# Patient Record
Sex: MALE | Race: WHITE | Employment: OTHER | URBAN - METROPOLITAN AREA
[De-identification: names, ages, dates, MRNs, and addresses within clinical notes are randomized per-mention and may not be internally consistent; named-entity substitution may affect disease eponyms.]

---

## 2017-02-13 ENCOUNTER — TELEPHONE (OUTPATIENT)
Dept: CARDIOLOGY CLINIC | Age: 62
End: 2017-02-13

## 2017-02-13 DIAGNOSIS — E78.00 HYPERCHOLESTEROLEMIA: Primary | ICD-10-CM

## 2017-02-13 NOTE — TELEPHONE ENCOUNTER
Patient called requesting lipid panel labs to be completed before his next visit with Dr Shar Hyde at the end of the month. Patient stated that his understanding was that Dr Shar Hyde wanted to see how his labs looked without any medication. Per Dr Missy Stockton last note, he was supposed to restart the medication after recovering from knee surgery, and if myalgias were still a problem, patient would stop it completely and maybe start something like Livalo in the future. Labs entered in patient's chart today and he states that he will get those labs completed prior to his next appt.       Verbal order and read back per Mandi Ortega MD

## 2017-02-14 ENCOUNTER — HOSPITAL ENCOUNTER (OUTPATIENT)
Dept: LAB | Age: 62
Discharge: HOME | End: 2017-02-14
Payer: COMMERCIAL

## 2017-02-14 DIAGNOSIS — E78.00 HYPERCHOLESTEROLEMIA: ICD-10-CM

## 2017-02-14 LAB
CHOLEST SERPL-MCNC: 284 MG/DL
HDLC SERPL-MCNC: 89 MG/DL (ref 40–60)
HDLC SERPL: 3.2 {RATIO} (ref 0–5)
LDLC SERPL CALC-MCNC: 186.2 MG/DL (ref 0–100)
LIPID PROFILE,FLP: ABNORMAL
TRIGL SERPL-MCNC: 44 MG/DL (ref ?–150)
VLDLC SERPL CALC-MCNC: 8.8 MG/DL

## 2017-02-14 PROCEDURE — 80061 LIPID PANEL: CPT | Performed by: INTERNAL MEDICINE

## 2017-02-14 PROCEDURE — 36415 COLL VENOUS BLD VENIPUNCTURE: CPT | Performed by: INTERNAL MEDICINE

## 2017-02-15 ENCOUNTER — OFFICE VISIT (OUTPATIENT)
Dept: FAMILY MEDICINE CLINIC | Age: 62
End: 2017-02-15

## 2017-02-15 VITALS
SYSTOLIC BLOOD PRESSURE: 126 MMHG | OXYGEN SATURATION: 96 % | HEART RATE: 56 BPM | BODY MASS INDEX: 30.03 KG/M2 | DIASTOLIC BLOOD PRESSURE: 84 MMHG | RESPIRATION RATE: 16 BRPM | WEIGHT: 234 LBS | HEIGHT: 74 IN | TEMPERATURE: 97.7 F

## 2017-02-15 DIAGNOSIS — I45.10 RBBB: ICD-10-CM

## 2017-02-15 DIAGNOSIS — Z12.11 COLON CANCER SCREENING: ICD-10-CM

## 2017-02-15 DIAGNOSIS — E78.00 HYPERCHOLESTEROLEMIA: ICD-10-CM

## 2017-02-15 DIAGNOSIS — I71.40 AAA (ABDOMINAL AORTIC ANEURYSM) WITHOUT RUPTURE: ICD-10-CM

## 2017-02-15 DIAGNOSIS — R73.03 PREDIABETES: Primary | ICD-10-CM

## 2017-02-15 DIAGNOSIS — Z12.5 PROSTATE CANCER SCREENING: ICD-10-CM

## 2017-02-15 DIAGNOSIS — I49.1 PAC (PREMATURE ATRIAL CONTRACTION): ICD-10-CM

## 2017-02-15 DIAGNOSIS — I35.1 AORTIC VALVE INSUFFICIENCY, UNSPECIFIED ETIOLOGY: ICD-10-CM

## 2017-02-15 LAB — HBA1C MFR BLD HPLC: 5.7 %

## 2017-02-15 NOTE — PATIENT INSTRUCTIONS

## 2017-02-15 NOTE — PROGRESS NOTES
Chief Complaint   Patient presents with    Cholesterol Problem    Other     prediabetes     Due for repeat colonoscopy. Per patient he is discontinued Pravastatin in Aug per Dr. Leo Suresh. Zostavax: has not had  Colonoscopy: due for repeat colonoscopy      1. Have you been to the ER, urgent care clinic since your last visit? Hospitalized since your last visit? No    2. Have you seen or consulted any other health care providers outside of the [facility] since your last visit? Include any pap smears or colon screening.  Yes When: Dr. Leo Suresh

## 2017-02-15 NOTE — MR AVS SNAPSHOT
Visit Information Date & Time Provider Department Dept. Phone Encounter #  
 2/15/2017  5:00 PM Alicia Dorsey, [address] [id number] Follow-up Instructions Return in about 6 months (around 8/15/2017) for routine care and please have 10 hour fasting labs done 1 week prior. Your Appointments 2/24/2017  4:00 PM  
Follow Up with Liliana Garnett MD  
[facility] ([facility] Appt Note: 1 year with an ekg Alfred Hirsch [facility] [id number]  
[phone] [address] [facility] [address]  [address] 3/17/2017  8:00 AM  
PROCEDURE with [facility]  
[facility] (LAUREN SCHEDULING) Appt Note: aaa [facility]val [address] [address]  
[phone] [address] [facility] [address]  
  
    
 3/22/2017  1:00 PM  
Follow Up with Maribel Quinteros MD  
[facility] (LAUREN SCHEDULING) [address] [address]  
[phone]  
  
   
 [address] [address] Upcoming Health Maintenance Date Due ZOSTER VACCINE AGE 60> 1/10/2015 COLONOSCOPY 1/29/2017 DTaP/Tdap/Td series (2 - Td) 6/29/2021 Allergies as of 2/15/2017  Review Complete On: 2/15/2017 By: Alicia Dorsey MD  
  
 Severity Noted Reaction Type Reactions Simvastatin High 02/22/2016   Intolerance Myalgia Current Immunizations  Reviewed on 8/17/2016 Name Date Influenza Vaccine 9/8/2016, 8/19/2015, 9/17/2014 Influenza Vaccine Split 10/17/2012 TDAP Vaccine 6/29/2011 Not reviewed this visit You Were Diagnosed With   
  
 Codes Comments Prediabetes    -  Primary ICD-10-CM: R73.03 
ICD-9-CM: 790.29 Hypercholesterolemia     ICD-10-CM: E78.00 ICD-9-CM: 272.0   
 AAA (abdominal aortic aneurysm) without rupture (HCC)     ICD-10-CM: I71.4 ICD-9-CM: 729. 4 Aortic valve insufficiency, unspecified etiology     ICD-10-CM: I35.1 ICD-9-CM: 424.1 RBBB     ICD-10-CM: I45.10 ICD-9-CM: 426.4 PAC (premature atrial contraction)     ICD-10-CM: I49.1 ICD-9-CM: 427.61 Prostate cancer screening     ICD-10-CM: Z12.5 ICD-9-CM: V76.44 Colon cancer screening     ICD-10-CM: Z12.11 ICD-9-CM: V76.51 Vitals BP Pulse Temp Resp Height(growth percentile) Weight(growth percentile) 126/84 (!) 56 97.7 °F (36.5 °C) (Oral) 16 6' 2\" (1.88 m) 234 lb (106.1 kg) SpO2 BMI Smoking Status 96% 30.04 kg/m2 Never Smoker Vitals History BMI and BSA Data Body Mass Index Body Surface Area 30.04 kg/m 2 2.35 m 2 Preferred Pharmacy Pharmacy Name Phone [address] [facility] [phone] Your Updated Medication List  
  
   
This list is accurate as of: 2/15/17  5:49 PM.  Always use your most recent med list.  
  
  
  
  
 EFFEXOR  mg capsule Generic drug:  venlafaxine-SR Take 150 mg by mouth daily. Indications: ANXIETY WITH DEPRESSION We Performed the Following AMB POC HEMOGLOBIN A1C [67974 CPT(R)] REFERRAL TO GASTROENTEROLOGY [CRU24 Custom] Comments:  
 Please evaluate patient for crcs Follow-up Instructions Return in about 6 months (around 8/15/2017) for routine care and please have 10 hour fasting labs done 1 week prior. To-Do List   
 02/15/2017 Lab:  CBC W/O DIFF   
  
 02/15/2017 Lab:  HEMOGLOBIN A1C W/O EAG   
  
 02/15/2017 Lab:  LIPID PANEL   
  
 02/15/2017 Lab:  METABOLIC PANEL, COMPREHENSIVE   
  
 02/15/2017 Lab:  PSA W/ REFLX FREE PSA Referral Information Referral ID Referred By Referred To  
  
 [id number] NAHOMY [facility], [address] [place] [address] [address] [place], [address] Phone: [phone] Fax: [phone] Visits Status Start Date End Date 1 New Request 2/15/17 2/15/18 If your referral has a status of pending review or denied, additional information will be sent to support the outcome of this decision. Patient Instructions A Healthy Lifestyle: Care Instructions Your Care Instructions A healthy lifestyle can help you feel good, stay at a healthy weight, and have plenty of energy for both work and play. A healthy lifestyle is something you can share with your whole family. A healthy lifestyle also can lower your risk for serious health problems, such as high blood pressure, heart disease, and diabetes. You can follow a few steps listed below to improve your health and the health of your family. Follow-up care is a key part of your treatment and safety. Be sure to make and go to all appointments, and call your doctor if you are having problems. Its also a good idea to know your test results and keep a list of the medicines you take. How can you care for yourself at home? · Do not eat too much sugar, fat, or fast foods. You can still have dessert and treats now and then. The goal is moderation. · Start small to improve your eating habits. Pay attention to portion sizes, drink less juice and soda pop, and eat more fruits and vegetables. ¨ Eat a healthy amount of food. A 3-ounce serving of meat, for example, is about the size of a deck of cards. Fill the rest of your plate with vegetables and whole grains. ¨ Limit the amount of soda and sports drinks you have every day. Drink more water when you are thirsty. ¨ Eat at least 5 servings of fruits and vegetables every day. It may seem like a lot, but it is not hard to reach this goal. A serving or helping is 1 piece of fruit, 1 cup of vegetables, or 2 cups of leafy, raw vegetables. Have an apple or some carrot sticks as an afternoon snack instead of a candy bar.  Try to have fruits and/or vegetables at every meal. 
 · Make exercise part of your daily routine. You may want to start with simple activities, such as walking, bicycling, or slow swimming. Try to be active 30 to 60 minutes every day. You do not need to do all 30 to 60 minutes all at once. For example, you can exercise 3 times a day for 10 or 20 minutes. Moderate exercise is safe for most people, but it is always a good idea to talk to your doctor before starting an exercise program. 
· Keep moving. Tingley Parody the lawn, work in the garden, or EZ-Apps. Take the stairs instead of the elevator at work. · If you smoke, quit. People who smoke have an increased risk for heart attack, stroke, cancer, and other lung illnesses. Quitting is hard, but there are ways to boost your chance of quitting tobacco for good. ¨ Use nicotine gum, patches, or lozenges. ¨ Ask your doctor about stop-smoking programs and medicines. ¨ Keep trying. In addition to reducing your risk of diseases in the future, you will notice some benefits soon after you stop using tobacco. If you have shortness of breath or asthma symptoms, they will likely get better within a few weeks after you quit. · Limit how much alcohol you drink. Moderate amounts of alcohol (up to 2 drinks a day for men, 1 drink a day for women) are okay. But drinking too much can lead to liver problems, high blood pressure, and other health problems. Family health If you have a family, there are many things you can do together to improve your health. · Eat meals together as a family as often as possible. · Eat healthy foods. This includes fruits, vegetables, lean meats and dairy, and whole grains. · Include your family in your fitness plan. Most people think of activities such as jogging or tennis as the way to fitness, but there are many ways you and your family can be more active. Anything that makes you breathe hard and gets your heart pumping is exercise. Here are some tips: ¨ Walk to do errands or to take your child to school or the bus. ¨ Go for a family bike ride after dinner instead of watching TV. Where can you learn more? Go to http://charo-primo.info/. Enter G715 in the search box to learn more about \"A Healthy Lifestyle: Care Instructions. \" Current as of: July 26, 2016 Content Version: 11.1 © 9356-9290 [employer]. Care instructions adapted under license by [employer] (which disclaims liability or warranty for this information). If you have questions about a medical condition or this instruction, always ask your healthcare professional. [address] any warranty or liability for your use of this information. Follow up in 6 months for routine care and please have 10 hour fasting labs done 1 week prior [facility]! Dear Nicolle Fall: Thank you for requesting a [facility] account. Our records indicate that you already have an active [facility] account. You can access your account anytime at [email or web address] [email or web address] Did you know that you can access your hospital and ER discharge instructions at any time in [facility]? You can also review all of your test results from your hospital stay or ER visit. Additional Information If you have questions, please visit the Frequently Asked Questions section of the [facility] website at [email or web address]/. Remember, [facility] is NOT to be used for urgent needs. For medical emergencies, dial 911. Now available from your iPhone and Android! Please provide this summary of care documentation to your next provider. Your primary care clinician is listed as Kyra Schuster. If you have any questions after today's visit, please call [phone].

## 2017-02-17 NOTE — PROGRESS NOTES
SUBJECTIVE  Chief Complaint   Patient presents with    Cholesterol Problem    Other     prediabetes     Patient presents for follow-up on their prediabetes, hypercholesterolemia, and AAA. Labs are up to date. We have reviewed the most recent labs since being off of pravastatin. He stopped due to mylagias. He says that they resolved now that he is off the statin. He has follow-up with cardiology soon. Is somewhat active with diet and exercise. He has had some tremors in his hands and has had chronic neck issues. He is seeing a chiropractor. His chiropractor stated that his pulse was slightly irregular and wanted to mention that to me. ROS:  History obtained from the patient  · General: negative for - chills, fever  · Respiratory: no cough, shortness of breath, or wheezing  · Cardiovascular: no chest pain, palpitations, or dyspnea on exertion  · Gastrointestinal: no abdominal pain, N/V    OBJECTIVE  Blood pressure 126/84, pulse (!) 56, temperature 97.7 °F (36.5 °C), temperature source Oral, resp. rate 16, height 6' 2\" (1.88 m), weight 234 lb (106.1 kg), SpO2 96 %. General:  alert, cooperative, well appearing, in no apparent distress. HEENT:  PERRLA, EOMI, MMM  Neck:  No masses. No carotid bruits. No thyromegaly. CV:  The heart sounds are regular in rate and rhythm. There is a normal S1 and S2. There or no murmurs. Lungs: Inspiratory and expiratory efforts are full and unlabored. Lung sounds are clear and equal to auscultation throughout all lung fields without wheezing, rales, or rhonchi. Neuro:    Normal gait. No deficits. Skin: no rashes, no jaundice. Psych: normal affect. Mood good. Oriented x 3. Judgement and insight intact. Results for orders placed or performed in visit on 02/15/17   AMB POC HEMOGLOBIN A1C   Result Value Ref Range    Hemoglobin A1c (POC) 5.7 %     ASSESSMENT / PLAN    ICD-10-CM ICD-9-CM    1.  Prediabetes R73.03 790.29 AMB POC HEMOGLOBIN A1C      CBC W/O DIFF      METABOLIC PANEL, COMPREHENSIVE      LIPID PANEL      HEMOGLOBIN A1C W/O EAG   2. Hypercholesterolemia Z20.92 035.9 METABOLIC PANEL, COMPREHENSIVE      LIPID PANEL   3. AAA (abdominal aortic aneurysm) without rupture (HCC) I71.4 441.4    4. Aortic valve insufficiency, unspecified etiology I35.1 424.1    5. RBBB I45.10 426.4    6. PAC (premature atrial contraction) I49.1 427.61    7. Prostate cancer screening Z12.5 V76.44 PSA W/ REFLX FREE PSA   8. Colon cancer screening Z12.11 V76.51 REFERRAL TO GASTROENTEROLOGY     Prediabetes - A1c looks great. Continue healthy living habits. Hypercholesterolemia - He will discuss this with his cardiologist.  Perhaps a different statin trial per his notes would be possible. AAA - follow-up with Dr. Nikolas Enriquez in March 2017 according to note. Aortic valve insufficiency-  Repeat ECHO in 1-2 years according to cardiology note. RBBB / PAC - perhaps what the chiropractor heard. Refer for colon cancer screening. Patient understands our medical plan. Patient has provided input and agrees with goals. Alternatives have been explained and offered. Risks/benefits and significant side effects of medications have been reviewed. Patient encouraged to review package inserts for any medications. All questions answered. The patient is to call if condition worsens or fails to improve. Follow-up Disposition:  Return in about 6 months (around 8/15/2017) for routine care and please have 10 hour fasting labs done 1 week prior.

## 2017-02-17 NOTE — PROGRESS NOTES
Per last note : Patient called requesting lipid panel labs to be completed before his next visit with Dr Juan Sauceda at the end of the month. Patient stated that his understanding was that Dr Juan Sauceda wanted to see how his labs looked without any medication. Per Dr Daiana Braun last note, he was supposed to restart the medication after recovering from knee surgery, and if myalgias were still a problem, patient would stop it completely and maybe start something like Livalo in the future. Labs entered in patient's chart today and he states that he will get those labs completed prior to his next appt.

## 2017-02-24 ENCOUNTER — OFFICE VISIT (OUTPATIENT)
Dept: CARDIOLOGY CLINIC | Age: 62
End: 2017-02-24

## 2017-02-24 VITALS
OXYGEN SATURATION: 97 % | SYSTOLIC BLOOD PRESSURE: 128 MMHG | BODY MASS INDEX: 29.77 KG/M2 | HEART RATE: 69 BPM | HEIGHT: 74 IN | DIASTOLIC BLOOD PRESSURE: 78 MMHG | WEIGHT: 232 LBS

## 2017-02-24 DIAGNOSIS — I45.10 RBBB: ICD-10-CM

## 2017-02-24 DIAGNOSIS — I71.40 AAA (ABDOMINAL AORTIC ANEURYSM) WITHOUT RUPTURE: ICD-10-CM

## 2017-02-24 DIAGNOSIS — E78.00 HYPERCHOLESTEROLEMIA: Primary | ICD-10-CM

## 2017-02-24 DIAGNOSIS — I35.1 AORTIC VALVE INSUFFICIENCY, UNSPECIFIED ETIOLOGY: ICD-10-CM

## 2017-02-24 RX ORDER — ROSUVASTATIN CALCIUM 5 MG/1
5 TABLET, COATED ORAL
Qty: 30 TAB | Refills: 6 | Status: SHIPPED | OUTPATIENT
Start: 2017-02-24 | End: 2017-10-04 | Stop reason: SDUPTHER

## 2017-02-24 NOTE — PROGRESS NOTES
1. Have you been to the ER, urgent care clinic since your last visit? Hospitalized since your last visit? No     2. Have you seen or consulted any other health care providers outside of the [address] [place] [address] since your last visit? Include any pap smears or colon screening.  No

## 2017-02-24 NOTE — PROGRESS NOTES
HISTORY OF PRESENT ILLNESS  Jono Simon is a 58 y.o. male. Cholesterol Problem   Pertinent negatives include no chest pain, no abdominal pain, no headaches and no shortness of breath. Patient presents for a follow-up office visit. He has a past medical history significant for dyslipidemia and atrial premature contractions. More recently he was discovered to have a small abdominal aortic aneurysm measuring 2.5 cm in diameter. He also discovered that he has a family history for premature coronary disease. He underwent a regular exercise treadmill stress test in August 2014 which was negative at 6 minutes total exercise time. More recently, he underwent an echocardiogram in March 2016 which showed preserved LV systolic function, EF 84-32% with grade 1 diastolic dysfunction and mild aortic valve insufficiency. He was last seen in the office 6 months ago. Since last visit, he has remained off his pravastatin because of myalgias, and he did notice an improvement in his symptoms after being off medication. He just recently had a repeat lipid panel off the medication to see what his cholesterol numbers would likely without any treatment. He denies any prolonged palpitations, no dizziness, no syncope. No chest pain, shortness of breath, orthopnea, leg swelling or PND. No significant change in his activity tolerance. Past Medical History:   Diagnosis Date    AAA (abdominal aortic aneurysm) ([facility] [address]     due 3/2017 to see Dr. Oriana Amato according to notes    Anxiety     Cardiac echocardiogram 03/04/2016    EF 55-60%. No RWMA. Gr 1 DDfx. Mild AI. No significant valvular heart disease.  Cardiac exercise stress test 08/01/2014    Neg EKG on maximal treadmill stress test.  One 6-beat episode of NSVT. Ex time 6 min.  Cardiovascular aorto-iliac duplex 03/12/2015    No AAA. Patent bilateral CIAs. Patent renal arteries.     Hearing loss     Hemorrhoids     Hypercholesterolemia     has not tolerated some statins in the past    Inguinal hernia, right     Lumbar herniated disc     L4-5, Dr. Beto Lloyd PAC (premature atrial contraction)     Prediabetes     S/P colonoscopy 1/29/07    Dr. Judy Albarado, no polyps      Current Outpatient Prescriptions   Medication Sig Dispense Refill    rosuvastatin (CRESTOR) 5 mg tablet Take 1 Tab by mouth nightly. 30 Tab 6    venlafaxine-SR (EFFEXOR XR) 150 mg capsule Take 150 mg by mouth daily. Indications: ANXIETY WITH DEPRESSION         Allergies   Allergen Reactions    Simvastatin Myalgia        Social History   Substance Use Topics    Smoking status: Never Smoker    Smokeless tobacco: Never Used    Alcohol use 0.0 oz/week     0 Cans of beer per week          Review of Systems   Constitutional: Negative for chills, fever and weight loss. HENT: Negative for nosebleeds. Eyes: Negative for blurred vision and double vision. Respiratory: Negative for cough, shortness of breath and wheezing. Cardiovascular: Negative for chest pain, palpitations, orthopnea, claudication, leg swelling and PND. Gastrointestinal: Negative for abdominal pain, heartburn, nausea and vomiting. Genitourinary: Negative for dysuria and hematuria. Musculoskeletal: Negative for falls, joint pain and myalgias. Skin: Negative for rash. Neurological: Negative for dizziness, focal weakness and headaches. Endo/Heme/Allergies: Does not bruise/bleed easily. Psychiatric/Behavioral: Negative for substance abuse. Visit Vitals    /78    Pulse 69    Ht 6' 2\" (1.88 m)    Wt 105.2 kg (232 lb)    SpO2 97%    BMI 29.79 kg/m2      Physical Exam   Constitutional: He is oriented to person, place, and time. He appears well-developed and well-nourished. HENT:   Head: Normocephalic and atraumatic. Eyes: Conjunctivae are normal.   Neck: Neck supple. No JVD present. Carotid bruit is not present.    Cardiovascular: Normal rate, regular rhythm, S1 normal, S2 normal and normal pulses. No extrasystoles are present. Exam reveals no gallop and no S3. No murmur heard. Pulmonary/Chest: Breath sounds normal. He has no wheezes. He has no rales. Abdominal: Soft. Bowel sounds are normal. There is no tenderness. Musculoskeletal: He exhibits no edema. Neurological: He is alert and oriented to person, place, and time. Skin: Skin is warm and dry. EKG: Normal sinus rhythm, leftward axis, occasional PVC, incomplete right bundle branch block, no ST or T wave abnormalities Concerning for ischemia. Compared to his previous EKG, right bundle branch block is no longer present. ASSESSMENT and PLAN    Dyslipidemia. The patient was taking pravastatin 40 mg daily up until last year. He states that this medication caused mild myalgias, but they improved once he was off the medication. He did not tolerate simvastatin or atorvastatin because of severe myalgias. A recent lipid panel done without medication showed a total cholesterol of 284, HDL 89, . This is a mild increase in his numbers from when he was taking the pravastatin. I recommended trying the lowest dose of Crestor to see if he will tolerate this. He could also try coenzyme Q 10 200 mg daily to see if this will minimize his myalgias. If he is able to tolerate his medication for several months, I would like to repeat a lipid panel and LFTs in April of this year. Intermittent right bundle branch block. This was seen on previous EKGs. Today's EKG shows more of an incomplete right bundle branch block, but he does not have any symptoms concerning for advanced AV block. This is clinically insignificant. Aortic valve insufficiency. This was only mild on a recent echocardiogram in 2016 and can be reevaluated in 2-3 years as long as he does not develop any new symptoms. Very Small AAA. Patient is scheduled to follow-up with vascular surgery. Followup in 6 months as previously scheduled.

## 2017-02-24 NOTE — MR AVS SNAPSHOT
Visit Information Date & Time Provider Department Dept. Phone Encounter #  
 2/24/2017  4:00 PM Sara Neal MD [facility] [phone] Your Appointments 3/17/2017  8:00 AM  
PROCEDURE with [facility] 1  
[facility] (LAUREN SCHEDULING) Appt Note: aaa duval [address] [address]  
[phone] [address] [facility] [address]  
  
    
 3/22/2017  2:00 PM  
Follow Up with Domingo Jurado MD  
[facility] (LAUREN SCHEDULING) Appt Note: 2 YR FU AFTER STUDY AT [facility]  ON 3/17/2017; GAVE PATIENT PREP; CALLED PT TO MOVE TO LATER TIME SAME DAY AND PT IS AWARE; 2 YR FU AFTER STUDY AT [facility] ON 3/17/2017 GAVE PATIENT PREP CALLED PT TO MOVE TO LATER TIME SAME DAY AND PT IS AWARE  
 [address] [facility] [address] [address]  
[phone]  
  
   
 [address] [facility] [address] [facility] [address] 8/16/2017  4:30 PM  
ROUTINE CARE with Brant Hutchison MD  
[facility] ([address]) Appt Note: 6 mo fu  
 [address] [facility] [address]  
[address] [facility] [address] [facility] [address] Upcoming Health Maintenance Date Due ZOSTER VACCINE AGE 60> 1/10/2015 COLONOSCOPY 1/29/2017 DTaP/Tdap/Td series (2 - Td) 6/29/2021 Allergies as of 2/24/2017  Review Complete On: 2/15/2017 By: Brant Hutchison MD  
  
 Severity Noted Reaction Type Reactions Simvastatin High 02/22/2016   Intolerance Myalgia Current Immunizations  Reviewed on 8/17/2016 Name Date Influenza Vaccine 9/8/2016, 8/19/2015, 9/17/2014 Influenza Vaccine Split 10/17/2012 TDAP Vaccine 6/29/2011 Not reviewed this visit You Were Diagnosed With   
  
 Codes Comments RBBB    -  Primary ICD-10-CM: I45.10 ICD-9-CM: 426.4 Aortic valve insufficiency, unspecified etiology     ICD-10-CM: I35.1 ICD-9-CM: 424.1 PAC (premature atrial contraction)     ICD-10-CM: I49.1 ICD-9-CM: 427.61   
 AAA (abdominal aortic aneurysm) without rupture (HCC)     ICD-10-CM: I71.4 ICD-9-CM: 441.4 Hypercholesterolemia     ICD-10-CM: E78.00 ICD-9-CM: 272.0 Vitals BP  
  
  
  
  
  
 128/78 Vitals History BMI and BSA Data Body Mass Index Body Surface Area  
 29.79 kg/m 2 2.34 m 2 Preferred Pharmacy Pharmacy Name Phone [facility] [address] [facility] 2/3 [phone] Your Updated Medication List  
  
   
This list is accurate as of: 2/24/17  4:19 PM.  Always use your most recent med list.  
  
  
  
  
 EFFEXOR  mg capsule Generic drug:  venlafaxine-SR Take 150 mg by mouth daily. Indications: ANXIETY WITH DEPRESSION We Performed the Following AMB POC EKG ROUTINE W/ 12 LEADS, INTER & REP [35603 CPT(R)] Introducing [address]! Dear Melonie Pickard: Thank you for requesting a e-SENS account. Our records indicate that you already have an active e-SENS account. You can access your account anytime at [email or web address] Did you know that you can access your hospital and ER discharge instructions at any time in e-SENS? You can also review all of your test results from your hospital stay or ER visit. Additional Information If you have questions, please visit the Frequently Asked Questions section of the e-SENS website at [email or web address] Remember, e-SENS is NOT to be used for urgent needs. For medical emergencies, dial 911. Now available from your iPhone and Android! Please provide this summary of care documentation to your next provider. Your primary care clinician is listed as Charito Cooper. If you have any questions after today's visit, please call [phone].

## 2017-03-17 ENCOUNTER — OFFICE VISIT (OUTPATIENT)
Dept: VASCULAR SURGERY | Age: 62
End: 2017-03-17

## 2017-03-17 DIAGNOSIS — I71.40 AAA (ABDOMINAL AORTIC ANEURYSM) WITHOUT RUPTURE: ICD-10-CM

## 2017-03-17 NOTE — PROCEDURES
[facility]   *** FINAL REPORT ***    Name: Maryse Chaudhari  MRN: [id number]       Outpatient  : [address] 1955  HIS Order #: [id number]  [address] [facility] Visit #: [id number]  Date: 17 Mar 2017    TYPE OF TEST: Aorto-Iliac Duplex    REASON FOR TEST  Suspected aortic aneurysm    B-Mode:-                 (cm)   1     2     3  Aortic diameter:         AP:     2.5   2.5   2.4                           TV:     2.5   2.5   2.4  Common iliac diameter:   Right: 1.50                           Left:  1.50    Duplex:-                           PSV  Stenosis                           ----- --------------------  Aorta: (1)                86.0 Normal         (2)                57.0         (3)                65.0    Right common iliac:  Right external iliac:     65.0    Left common iliac:        76.0 Normal  Left external iliac:    INTERPRETATION/FINDINGS  Duplex images were obtained using 2-D gray scale, color flow and  spectral doppler analysis. 1. Abdominal aorta is within normal limits, no aneurysm identified. 2. Bilateral common iliac arteries patent and within normal limits. Right common iliac/left external iliac artery velocites omitted by  error. 3. Multiphasic signals noted. 4. Mid SMA, proximal MERCEDES and proximal renal arteries patent. ADDITIONAL COMMENTS  Celiac: not accessed, gas   SMA Mid: 84 cm/sec  RRA Prx: 109 cm/sec   LRA Prx: 86 cm/sec  MERCEDES origin: 91 cm/sec    I have personally reviewed the data relevant to the interpretation of  this  study. TECHNOLOGIST: Malcik Rehman RVT, SOFIA  Signed: 2017 09:08 AM    PHYSICIAN: Sarthak Dockery D.O.   Signed: 2017 02:17 PM

## 2017-03-22 ENCOUNTER — OFFICE VISIT (OUTPATIENT)
Dept: VASCULAR SURGERY | Age: 62
End: 2017-03-22

## 2017-03-22 VITALS
SYSTOLIC BLOOD PRESSURE: 122 MMHG | HEART RATE: 77 BPM | WEIGHT: 232 LBS | RESPIRATION RATE: 12 BRPM | HEIGHT: 74 IN | BODY MASS INDEX: 29.77 KG/M2 | DIASTOLIC BLOOD PRESSURE: 82 MMHG

## 2017-03-22 DIAGNOSIS — Z82.49 FAMILY HISTORY OF ABDOMINAL AORTIC ANEURYSM (AAA): Primary | ICD-10-CM

## 2017-03-22 NOTE — PROGRESS NOTES
Tiffany Leung    Chief Complaint   Patient presents with    Abdominal Aortic Aneurysm       History and Physical    Tiffany Leung is a 58 y.o. male with concern of abdominal aortic aneurysm. Patient states that he has a familial history of abdominal aortic aneurysm ended up getting an outside ultrasound which showed a possibility of a 2.5 cm small aortic aneurysm. Although I did have a long conversation with him telling him that I did not quite believe that. He continues to have no abdominal or back pain. Past Medical History:   Diagnosis Date    AAA (abdominal aortic aneurysm) ([address]     due 3/2017 to see Dr. Liana Luther according to notes    Anxiety     Cardiac echocardiogram 03/04/2016    EF 55-60%. No RWMA. Gr 1 DDfx. Mild AI. No significant valvular heart disease.  Cardiac exercise stress test 08/01/2014    Neg EKG on maximal treadmill stress test.  One 6-beat episode of NSVT. Ex time 6 min.  Cardiovascular aorto-iliac duplex 03/12/2015    No AAA. Patent bilateral CIAs. Patent renal arteries.     Hearing loss     Hemorrhoids     Hypercholesterolemia     has not tolerated some statins in the past    Inguinal hernia, right     Lumbar herniated disc     L4-5, Dr. Berl Jeans PAC (premature atrial contraction)     Prediabetes     S/P colonoscopy 1/29/07    Dr. Tonny Bhagat, no polyps     Past Surgical History:   Procedure Laterality Date    HX BACK SURGERY  7/21/2011    Dr. Elizabeth Hernandez  11/12    right inguinal    HX KNEE REPLACEMENT Right 09/07/2016    HX SHOULDER ARTHROSCOPY Left 07/20/2015    Dr. Ya Peters / Dariela Hammondberger Cuff Repair    HX TONSILLECTOMY  15yo     Patient Active Problem List   Diagnosis Code    Hypercholesterolemia E78.00    PAC (premature atrial contraction) I49.1    AAA (abdominal aortic aneurysm) without rupture (HCC) I71.4    Family history of abdominal aortic aneurysm (AAA) Z82.49    Left shoulder pain M25.512    S/P rotator cuff repair Z98.890    RBBB I45.10    Aortic valve insufficiency I35.1     Current Outpatient Prescriptions   Medication Sig Dispense Refill    rosuvastatin (CRESTOR) 5 mg tablet Take 1 Tab by mouth nightly. 30 Tab 6    venlafaxine-SR (EFFEXOR XR) 150 mg capsule Take 150 mg by mouth daily. Indications: ANXIETY WITH DEPRESSION       Allergies   Allergen Reactions    Simvastatin Myalgia     Social History     Social History    Marital status:      Spouse name: N/A    Number of children: N/A    Years of education: N/A     Occupational History          Social History Main Topics    Smoking status: Never Smoker    Smokeless tobacco: Never Used    Alcohol use 0.0 oz/week     0 Cans of beer per week    Drug use: No    Sexual activity: Yes     Partners: Female     Other Topics Concern    Not on file     Social History Narrative      Family History   Problem Relation Age of Onset    Alcohol abuse Father     Cancer Father 79     lung    Coronary Artery Disease Father      46s    Emphysema Father     Heart Failure Father     Other Father      AAA    Heart Attack Maternal Grandmother     Other Brother      AAA    Coronary Artery Disease Brother 77       Physical Exam:    Visit Vitals    /82 (BP 1 Location: Right arm, BP Patient Position: Sitting)    Pulse 77    Resp 12    Ht 6' 2\" (1.88 m)    Wt 232 lb (105.2 kg)    BMI 29.79 kg/m2      General: Well-appearing male in no acute distress  HEENT: EOMI no scleral icterus is noted patient is wearing eyeglasses  Pulmonary: No increased work of breathing is noted  Extremities: Warm and well-perfused bilaterally  Neuro: Cranial nerves II through XII are grossly intact    Impression and Plan:  Francisco Maria is a 58 y.o. male with normal aorta.   I reviewed his ultrasound clinic today this confirms my suspicion that he probably had either somebody plug in the wrong number or Mr. Delgado Betters his previous ultrasound but his he has a normal aorta of 2.5 cm throughout his aorta. This is perfectly normal for man of his size. I do not feel that any further follow-up is required at this current time. I did tell him that if he does develop in the aneurysm in the future we are happy to see him although I would find this to be hard to believe given his size at his current age. He can come back and see me for any further vascular surgical needs. We reviewed the plan with the patient and the patient understands. We also gave the patient appropriate instructions on their disease process and when to call back. Follow-up Disposition:  Return if symptoms worsen or fail to improve. David Villarreal MD    PLEASE NOTE:  This document has been produced using voice recognition software. Unrecognized errors in transcription may be present.

## 2017-03-22 NOTE — MR AVS SNAPSHOT
Visit Information Date & Time Provider Department Dept. Phone Encounter #  
 3/22/2017  2:00 PM Malathi Mcneil, [address] [facility] [phone] [id number] Follow-up Instructions Return if symptoms worsen or fail to improve. Your Appointments 8/16/2017  4:30 PM  
ROUTINE CARE with Asiya Powell MD  
[facility] ([facility]) Appt Note: 6 mo fu  
 [address] [facility] [address]  
[address] [facility] [address] [facility] [address]  
  
    
 8/28/2017  3:20 PM  
Follow Up with Zora Beyer MD  
[facility] [place] [facility] ([facility]) Appt Note: 6 month follow up Alfred Mehta [id number] [phone] [address] Upcoming Health Maintenance Date Due ZOSTER VACCINE AGE 60> 1/10/2015 COLONOSCOPY 1/29/2017 DTaP/Tdap/Td series (2 - Td) 6/29/2021 Allergies as of 3/22/2017  Review Complete On: 3/22/2017 By: Maued Aaron Severity Noted Reaction Type Reactions Simvastatin High 02/22/2016   Intolerance Myalgia Current Immunizations  Reviewed on 8/17/2016 Name Date Influenza Vaccine 9/8/2016, 8/19/2015, 9/17/2014 Influenza Vaccine Split 10/17/2012 TDAP Vaccine 6/29/2011 Not reviewed this visit Vitals BP Pulse Resp Height(growth percentile) Weight(growth percentile) BMI  
 122/82 (BP 1 Location: Right arm, BP Patient Position: Sitting) 77 12 6' 2\" (1.88 m) 232 lb (105.2 kg) 29.79 kg/m2 Smoking Status Never Smoker Vitals History BMI and BSA Data Body Mass Index Body Surface Area  
 29.79 kg/m 2 2.34 m 2 Preferred Pharmacy Pharmacy Name Phone [facility] [address] [facility] 2/3 [phone] Your Updated Medication List  
  
   
 This list is accurate as of: 3/22/17  2:04 PM.  Always use your most recent med list.  
  
  
  
  
 EFFEXOR  mg capsule Generic drug:  venlafaxine-SR Take 150 mg by mouth daily. Indications: ANXIETY WITH DEPRESSION  
  
 rosuvastatin 5 mg tablet Commonly known as:  CRESTOR Take 1 Tab by mouth nightly. Follow-up Instructions Return if symptoms worsen or fail to improve. Introducing [facility]! Dear Ines Hobbs: Thank you for requesting a iWarda account. Our records indicate that you already have an active iWarda account. You can access your account anytime at [email or web address] Did you know that you can access your hospital and ER discharge instructions at any time in iWarda? You can also review all of your test results from your hospital stay or ER visit. Additional Information If you have questions, please visit the Frequently Asked Questions section of the iWarda website at [email or web address] Remember, iWarda is NOT to be used for urgent needs. For medical emergencies, dial 911. Now available from your iPhone and Android! Please provide this summary of care documentation to your next provider. Your primary care clinician is listed as Rah Mcfarland. If you have any questions after today's visit, please call [phone].

## 2017-08-09 ENCOUNTER — HOSPITAL ENCOUNTER (OUTPATIENT)
Dept: LAB | Age: 62
Discharge: HOME | End: 2017-08-09
Payer: COMMERCIAL

## 2017-08-09 LAB
ALBUMIN SERPL BCP-MCNC: 4 G/DL (ref 3.4–5)
ALBUMIN/GLOB SERPL: 1.4 {RATIO} (ref 0.8–1.7)
ALP SERPL-CCNC: 52 U/L (ref 45–117)
ALT SERPL-CCNC: 48 U/L (ref 16–61)
ANION GAP BLD CALC-SCNC: 6 MMOL/L (ref 3–18)
AST SERPL W P-5'-P-CCNC: 34 U/L (ref 15–37)
BILIRUB SERPL-MCNC: 0.7 MG/DL (ref 0.2–1)
BUN SERPL-MCNC: 19 MG/DL (ref 7–18)
BUN/CREAT SERPL: 18 (ref 12–20)
CALCIUM SERPL-MCNC: 8.4 MG/DL (ref 8.5–10.1)
CHLORIDE SERPL-SCNC: 103 MMOL/L (ref 100–108)
CHOLEST SERPL-MCNC: 225 MG/DL
CO2 SERPL-SCNC: 30 MMOL/L (ref 21–32)
CREAT SERPL-MCNC: 1.05 MG/DL (ref 0.6–1.3)
ERYTHROCYTE [DISTWIDTH] IN BLOOD BY AUTOMATED COUNT: 14.4 % (ref 11.6–14.5)
GLOBULIN SER CALC-MCNC: 2.9 G/DL (ref 2–4)
GLUCOSE SERPL-MCNC: 94 MG/DL (ref 74–99)
HBA1C MFR BLD: 5.9 % (ref 4.2–5.6)
HCT VFR BLD AUTO: 43.9 % (ref 36–48)
HDLC SERPL-MCNC: 89 MG/DL (ref 40–60)
HDLC SERPL: 2.5 {RATIO} (ref 0–5)
HGB BLD-MCNC: 14.8 G/DL (ref 13–16)
LDLC SERPL CALC-MCNC: 118.2 MG/DL (ref 0–100)
LIPID PROFILE,FLP: ABNORMAL
MCH RBC QN AUTO: 30.5 PG (ref 24–34)
MCHC RBC AUTO-ENTMCNC: 33.7 G/DL (ref 31–37)
MCV RBC AUTO: 90.3 FL (ref 74–97)
PLATELET # BLD AUTO: 169 K/UL (ref 135–420)
PMV BLD AUTO: 10.5 FL (ref 9.2–11.8)
POTASSIUM SERPL-SCNC: 4 MMOL/L (ref 3.5–5.5)
PROT SERPL-MCNC: 6.9 G/DL (ref 6.4–8.2)
RBC # BLD AUTO: 4.86 M/UL (ref 4.7–5.5)
SODIUM SERPL-SCNC: 139 MMOL/L (ref 136–145)
TRIGL SERPL-MCNC: 89 MG/DL (ref ?–150)
VLDLC SERPL CALC-MCNC: 17.8 MG/DL
WBC # BLD AUTO: 4.6 K/UL (ref 4.6–13.2)

## 2017-08-09 PROCEDURE — 83036 HEMOGLOBIN GLYCOSYLATED A1C: CPT | Performed by: FAMILY MEDICINE

## 2017-08-09 PROCEDURE — 80053 COMPREHEN METABOLIC PANEL: CPT | Performed by: FAMILY MEDICINE

## 2017-08-09 PROCEDURE — 85027 COMPLETE CBC AUTOMATED: CPT | Performed by: FAMILY MEDICINE

## 2017-08-09 PROCEDURE — 80061 LIPID PANEL: CPT | Performed by: FAMILY MEDICINE

## 2017-08-09 PROCEDURE — 84153 ASSAY OF PSA TOTAL: CPT | Performed by: FAMILY MEDICINE

## 2017-08-09 PROCEDURE — 36415 COLL VENOUS BLD VENIPUNCTURE: CPT | Performed by: FAMILY MEDICINE

## 2017-08-10 LAB
PSA SERPL-MCNC: 0.5 NG/ML (ref 0–4)
REFLEX CRITERIA: NORMAL

## 2017-08-16 ENCOUNTER — OFFICE VISIT (OUTPATIENT)
Dept: FAMILY MEDICINE CLINIC | Age: 62
End: 2017-08-16

## 2017-08-16 VITALS
DIASTOLIC BLOOD PRESSURE: 88 MMHG | OXYGEN SATURATION: 98 % | WEIGHT: 235 LBS | HEART RATE: 77 BPM | SYSTOLIC BLOOD PRESSURE: 130 MMHG | HEIGHT: 74 IN | BODY MASS INDEX: 30.16 KG/M2 | TEMPERATURE: 98.1 F | RESPIRATION RATE: 14 BRPM

## 2017-08-16 DIAGNOSIS — E78.00 HYPERCHOLESTEROLEMIA: Primary | ICD-10-CM

## 2017-08-16 DIAGNOSIS — I45.10 RBBB: ICD-10-CM

## 2017-08-16 DIAGNOSIS — R73.03 PREDIABETES: ICD-10-CM

## 2017-08-16 DIAGNOSIS — E66.9 OBESITY, UNSPECIFIED OBESITY SEVERITY, UNSPECIFIED OBESITY TYPE: ICD-10-CM

## 2017-08-16 DIAGNOSIS — I35.1 AORTIC VALVE REGURGITATION, UNSPECIFIED ETIOLOGY: ICD-10-CM

## 2017-08-16 DIAGNOSIS — M17.12 ARTHRITIS OF LEFT KNEE: ICD-10-CM

## 2017-08-16 DIAGNOSIS — Z82.49 FAMILY HISTORY OF ABDOMINAL AORTIC ANEURYSM (AAA): ICD-10-CM

## 2017-08-16 NOTE — MR AVS SNAPSHOT
Visit Information Date & Time Provider Department Dept. Phone Encounter #  
 8/16/2017  4:30 PM Liz Scott, [address] [id number] Follow-up Instructions Return in about 6 months (around 2/16/2018) for routine care. A1c to be done in office . Your Appointments 9/15/2017  2:40 PM  
POST HOSPITAL with Karina Solis MD  
[facility] ([facility]) Appt Note: R/s due to provider out of office. ..sb  
 Alfred Villal[place] [id number]  
[phone] [address] Upcoming Health Maintenance Date Due ZOSTER VACCINE AGE 60> 11/10/2014 INFLUENZA AGE 9 TO ADULT 8/1/2017 DTaP/Tdap/Td series (2 - Td) 6/29/2021 COLONOSCOPY 4/5/2027 Allergies as of 8/16/2017  Review Complete On: 8/16/2017 By: Hector Meza LPN Severity Noted Reaction Type Reactions Simvastatin High 02/22/2016   Intolerance Myalgia Current Immunizations  Reviewed on 8/17/2016 Name Date Influenza Vaccine 9/8/2016, 8/19/2015, 9/17/2014 Influenza Vaccine Split 10/17/2012 TDAP Vaccine 6/29/2011 Not reviewed this visit You Were Diagnosed With   
  
 Codes Comments Hypercholesterolemia    -  Primary ICD-10-CM: E78.00 ICD-9-CM: 272.0 Aortic valve regurgitation, unspecified etiology     ICD-10-CM: I35.1 ICD-9-CM: 424.1 Family history of abdominal aortic aneurysm (AAA)     ICD-10-CM: Z82.49 
ICD-9-CM: V17.49 Obesity, unspecified obesity severity, unspecified obesity type     ICD-10-CM: E66.9 ICD-9-CM: 278.00 Arthritis of left knee     ICD-10-CM: M17.12 
ICD-9-CM: 716.96 Vitals BP Pulse Temp Resp Height(growth percentile) Weight(growth percentile) 130/88 (BP 1 Location: Left arm, BP Patient Position: Sitting) 77 98.1 °F (36.7 °C) (Oral) 14 6' 2\" (1.88 m) 235 lb (106.6 kg) SpO2 BMI Smoking Status 98% 30.17 kg/m2 Never Smoker BMI and BSA Data Body Mass Index Body Surface Area  
 30.17 kg/m 2 2.36 m 2 Preferred Pharmacy Pharmacy Name Phone [facility] [address] 2/3 [phone] Your Updated Medication List  
  
   
This list is accurate as of: 8/16/17  5:02 PM.  Always use your most recent med list.  
  
  
  
  
 EFFEXOR  mg capsule Generic drug:  venlafaxine-SR Take 150 mg by mouth daily. Indications: ANXIETY WITH DEPRESSION  
  
 rosuvastatin 5 mg tablet Commonly known as:  CRESTOR Take 1 Tab by mouth nightly. Follow-up Instructions Return in about 6 months (around 2/16/2018) for routine care. A1c to be done in office . Patient Instructions Shingles Vaccine: What You Need to Know What is shingles? Shingles is a painful skin rash, often with blisters. It is also called herpes zoster, or just zoster. A shingles rash usually appears on one side of the face or body and lasts from 2 to 4 weeks. Its main symptom is pain, which can be quite severe. Other symptoms of shingles can include fever, headache, chills and upset stomach. Very rarely, a shingles infection can lead to pneumonia, hearing problems, blindness, brain inflammation (encephalitis) or death. For about 1 person in 5, severe pain can continue even long after the rash clears up. This is called post-herpetic neuralgia. Shingles is caused by the varicella zoster virus, the same virus that causes chickenpox. Only someone who has had chickenpoxor, rarely, has gotten chickenpox vaccinecan get shingles. The virus stays in your body, and can cause shingles many years later. You can't catch shingles from another person with shingles. However, a person who has never had chickenpox (or chickenpox vaccine) could get chickenpox from someone with shingles. This is not very common. Shingles is far more common in people 48years of age and older than in younger people. It is also more common in people whose immune systems are weakened because of a disease such as cancer, or drugs such as steroids or chemotherapy. At least 1 million people a year in the [facility] get shingles. Shingles vaccine A vaccine for shingles was licensed in 0627. In clinical trials, the vaccine reduced the risk of shingles by 50%. It can also reduce pain in people who still get shingles after being vaccinated. A single dose of shingles vaccine is recommended for adults 61years of age and older. Some people should not get shingles vaccine or should wait A person should not get shingles vaccine who: 
· Has ever had a life-threatening allergic reaction to gelatin, the antibiotic neomycin, or any other component of shingles vaccine. Tell your doctor if you have any severe allergies. · Has a weakened immune system because of current: 
¨ AIDS or another disease that affects the immune system. ¨ Treatment with drugs that affect the immune system, such as prolonged use of high-dose steroids. ¨ Cancer treatment such as radiation or chemotherapy. ¨ Cancer affecting the bone marrow or lymphatic system, such as leukemia or lymphoma. · Is pregnant, or might be pregnant. Women should not become pregnant until at least 4 weeks after getting shingles vaccine. Someone with a minor acute illness, such as a cold, may be vaccinated. But anyone with a moderate or severe acute illness should usually wait until they recover before getting the vaccine. This includes anyone with a temperature of 101.3° F or higher. What are the risks from shingles vaccine? A vaccine, like any medicine, could possibly cause serious problems, such as severe allergic reactions. However, the risk of a vaccine causing serious harm, or death, is extremely small. No serious problems have been identified with shingles vaccine. Mild problems · Redness, soreness, swelling, or itching at the site of the injection (about 1 person in 3) · Headache (about 1 person in 79) Like all vaccines, shingles vaccine is being closely monitored for unusual or severe problems. What if there is a serious reaction? What should I look for? · Look for anything that concerns you, such as signs of a severe allergic reaction, very high fever, or behavior changes. Signs of a severe allergic reaction can include hives, swelling of the face and throat, difficulty breathing, a fast heartbeat, dizziness, and weakness. These would start a few minutes to a few hours after the vaccination. What should I do? · If you think it is a severe allergic reaction or other emergency that can't wait, call 9-1-1 or get the person to the nearest hospital. Otherwise, call your doctor. · Afterward, the reaction should be reported to the Vaccine Adverse Event Reporting System (VAERS). Your doctor might file this report, or you can do it yourself through the VAERS web site at www.vaers. [facility].gov, or by calling 6-433.510.5823. VAERS is only for reporting reactions. They do not give medical advice. How can I learn more? · Ask your doctor. · Call your local or state health department. · Contact the Centers for Disease Control and Prevention (CDC): 
¨ Call 2-339.201.6291 (1-800-CDC-INFO) or ¨ Visit CDC's website at www.cdc.gov/vaccines Vaccine Information Statement Shingles Vaccine 
(10/6/2009) Department of Health and [employer] Centers for Disease Control and Prevention Many Vaccine Information Statements are available in Hungarian and other languages. See www.immunize.org/vis. Muchas hojas de información sobre vacunas están disponibles en español y en otros idiomas. Visite www.immunize.org/vis. Care instructions adapted under license by [employer] (which disclaims liability or warranty for this information).  If you have questions about a medical condition or this instruction, always ask your healthcare professional. [address] any warranty or liability for your use of this information. Introducing [facility]! Dear Hazel Love: Thank you for requesting a Go World! account. Our records indicate that you already have an active Go World! account. You can access your account anytime at [email or web address] acquired by [email or web address] acquired by Cellfish/Bandsintown Did you know that you can access your hospital and ER discharge instructions at any time in Go World!? You can also review all of your test results from your hospital stay or ER visit. Additional Information If you have questions, please visit the Frequently Asked Questions section of the Go World! website at [email or web address] acquired by [email or web address]/[email or web address]/. Remember, Go World! is NOT to be used for urgent needs. For medical emergencies, dial 911. Now available from your iPhone and Android! Please provide this summary of care documentation to your next provider. Your primary care clinician is listed as Mirta Akbar. If you have any questions after today's visit, please call [phone].

## 2017-08-16 NOTE — PATIENT INSTRUCTIONS
Shingles Vaccine: What You Need to Know  What is shingles? Shingles is a painful skin rash, often with blisters. It is also called herpes zoster, or just zoster. A shingles rash usually appears on one side of the face or body and lasts from 2 to 4 weeks. Its main symptom is pain, which can be quite severe. Other symptoms of shingles can include fever, headache, chills and upset stomach. Very rarely, a shingles infection can lead to pneumonia, hearing problems, blindness, brain inflammation (encephalitis) or death. For about 1 person in 5, severe pain can continue even long after the rash clears up. This is called post-herpetic neuralgia. Shingles is caused by the varicella zoster virus, the same virus that causes chickenpox. Only someone who has had chickenpoxor, rarely, has gotten chickenpox vaccinecan get shingles. The virus stays in your body, and can cause shingles many years later. You can't catch shingles from another person with shingles. However, a person who has never had chickenpox (or chickenpox vaccine) could get chickenpox from someone with shingles. This is not very common. Shingles is far more common in people 48years of age and older than in younger people. It is also more common in people whose immune systems are weakened because of a disease such as cancer, or drugs such as steroids or chemotherapy. At least 1 million people a year in the [facility] get shingles. Shingles vaccine  A vaccine for shingles was licensed in 4232. In clinical trials, the vaccine reduced the risk of shingles by 50%. It can also reduce pain in people who still get shingles after being vaccinated. A single dose of shingles vaccine is recommended for adults 61years of age and older.   Some people should not get shingles vaccine or should wait  A person should not get shingles vaccine who:  · Has ever had a life-threatening allergic reaction to gelatin, the antibiotic neomycin, or any other component of shingles vaccine. Tell your doctor if you have any severe allergies. · Has a weakened immune system because of current:  ¨ AIDS or another disease that affects the immune system. ¨ Treatment with drugs that affect the immune system, such as prolonged use of high-dose steroids. ¨ Cancer treatment such as radiation or chemotherapy. ¨ Cancer affecting the bone marrow or lymphatic system, such as leukemia or lymphoma. · Is pregnant, or might be pregnant. Women should not become pregnant until at least 4 weeks after getting shingles vaccine. Someone with a minor acute illness, such as a cold, may be vaccinated. But anyone with a moderate or severe acute illness should usually wait until they recover before getting the vaccine. This includes anyone with a temperature of 101.3° F or higher. What are the risks from shingles vaccine? A vaccine, like any medicine, could possibly cause serious problems, such as severe allergic reactions. However, the risk of a vaccine causing serious harm, or death, is extremely small. No serious problems have been identified with shingles vaccine. Mild problems  · Redness, soreness, swelling, or itching at the site of the injection (about 1 person in 3)  · Headache (about 1 person in 70)  Like all vaccines, shingles vaccine is being closely monitored for unusual or severe problems. What if there is a serious reaction? What should I look for? · Look for anything that concerns you, such as signs of a severe allergic reaction, very high fever, or behavior changes. Signs of a severe allergic reaction can include hives, swelling of the face and throat, difficulty breathing, a fast heartbeat, dizziness, and weakness. These would start a few minutes to a few hours after the vaccination. What should I do? · If you think it is a severe allergic reaction or other emergency that can't wait, call 9-1-1 or get the person to the nearest hospital. Otherwise, call your doctor.   · Afterward, the reaction should be reported to the Vaccine Adverse Event Reporting System (VAERS). Your doctor might file this report, or you can do it yourself through the VAERS web site at www.vaers. [facility].gov, or by calling 1-237.930.1367. VAERS is only for reporting reactions. They do not give medical advice. How can I learn more? · Ask your doctor. · Call your local or state health department. · Contact the Centers for Disease Control and Prevention (CDC):  ¨ Call 8-602.227.8848 (1-800-CDC-INFO) or  ¨ Visit CDC's website at www.cdc.gov/vaccines  Vaccine Information Statement  Shingles Vaccine  (10/6/2009)  Department of Health and Human Services  Centers for Disease Control and Prevention  Many Vaccine Information Statements are available in Thai and other languages. See www.immunize.org/vis. Muchas hojas de información sobre vacunas están disponibles en español y en otros idiomas. Visite www.immunize.org/vis. Care instructions adapted under license by [employer] (which disclaims liability or warranty for this information). If you have questions about a medical condition or this instruction, always ask your healthcare professional. [address] any warranty or liability for your use of this information.

## 2017-08-16 NOTE — PROGRESS NOTES
SUBJECTIVE  Chief Complaint   Patient presents with    Cholesterol Problem    Other     prediabetes and hypocalcemia    Other     AAA, RBBB, aortic valve insufficiency, and PAC-sees cardiology (Dr. Amaris Lemon)     Patient presents for follow-up on their prediabetes, hypercholesterolemia, and AAA. Labs are up to date. We have reviewed the most recent labs since being on low dose crestor. He has not had any mylagias. He has follow-up with cardiology soon. Is somewhat active with diet and exercise. He has seen vascular who stated that he does not indeed have a AAA and to see them as needed. ROS:  History obtained from the patient  · Respiratory: no cough, shortness of breath, or wheezing  · Cardiovascular: no chest pain, palpitations, or dyspnea on exertion  · Gastrointestinal: no abdominal pain, N/V    OBJECTIVE  Blood pressure 130/88, pulse 77, temperature 98.1 °F (36.7 °C), temperature source Oral, resp. rate 14, height 6' 2\" (1.88 m), weight 235 lb (106.6 kg), SpO2 98 %. General:  alert, cooperative, well appearing, in no apparent distress. HEENT:  PERRLA, EOMI, MMM  Neck:  No masses. No carotid bruits. No thyromegaly. CV:  The heart sounds are regular in rate and rhythm. There is a normal S1 and S2. There or no murmurs. Lungs: Inspiratory and expiratory efforts are full and unlabored. Lung sounds are clear and equal to auscultation throughout all lung fields without wheezing, rales, or rhonchi. Neuro:    Normal gait. No deficits. Skin: no rashes, no jaundice. Psych: normal affect. Mood good. Oriented x 3. Judgement and insight intact.      Results for orders placed or performed during the hospital encounter of 08/09/17   CBC W/O DIFF   Result Value Ref Range    WBC 4.6 4.6 - 13.2 K/uL    RBC 4.86 4.70 - 5.50 M/uL    HGB 14.8 13.0 - 16.0 g/dL    HCT 43.9 36.0 - 48.0 %    MCV 90.3 74.0 - 97.0 FL    MCH 30.5 24.0 - 34.0 PG    MCHC 33.7 31.0 - 37.0 g/dL    RDW 14.4 11.6 - 14.5 % PLATELET 752 774 - 595 K/uL    MPV 10.5 9.2 - 11.8 FL   LIPID PANEL   Result Value Ref Range    LIPID PROFILE          Cholesterol, total 225 (H) <200 MG/DL    Triglyceride 89 <150 MG/DL    HDL Cholesterol 89 (H) 40 - 60 MG/DL    LDL, calculated 118.2 (H) 0 - 100 MG/DL    VLDL, calculated 17.8 MG/DL    CHOL/HDL Ratio 2.5 0 - 5.0     METABOLIC PANEL, COMPREHENSIVE   Result Value Ref Range    Sodium 139 136 - 145 mmol/L    Potassium 4.0 3.5 - 5.5 mmol/L    Chloride 103 100 - 108 mmol/L    CO2 30 21 - 32 mmol/L    Anion gap 6 3.0 - 18 mmol/L    Glucose 94 74 - 99 mg/dL    BUN 19 (H) 7.0 - 18 MG/DL    Creatinine 1.05 0.6 - 1.3 MG/DL    BUN/Creatinine ratio 18 12 - 20      GFR est AA >60 >60 ml/min/1.73m2    GFR est non-AA >60 >60 ml/min/1.73m2    Calcium 8.4 (L) 8.5 - 10.1 MG/DL    Bilirubin, total 0.7 0.2 - 1.0 MG/DL    ALT (SGPT) 48 16 - 61 U/L    AST (SGOT) 34 15 - 37 U/L    Alk. phosphatase 52 45 - 117 U/L    Protein, total 6.9 6.4 - 8.2 g/dL    Albumin 4.0 3.4 - 5.0 g/dL    Globulin 2.9 2.0 - 4.0 g/dL    A-G Ratio 1.4 0.8 - 1.7     PSA W/ REFLX FREE PSA   Result Value Ref Range    Prostate Specific Ag 0.5 0.0 - 4.0 ng/mL    Reflex Criteria Comment     HEMOGLOBIN A1C W/O EAG   Result Value Ref Range    Hemoglobin A1c 5.9 (H) 4.2 - 5.6 %     ASSESSMENT / PLAN    ICD-10-CM ICD-9-CM    1. Hypercholesterolemia E78.00 272.0    2. Prediabetes R73.03 790.29    3. Aortic valve regurgitation, unspecified etiology I35.1 424.1    4. RBBB I45.10 426.4    5. Family history of abdominal aortic aneurysm (AAA) Z82.49 V17.49    6. Obesity, unspecified obesity severity, unspecified obesity type E66.9 278.00    7. Arthritis of left knee M17.12 716.96      Hypercholesterolemia - Reviewed labs. Cont Crestor. He will discuss this with his cardiologist as to his LDL levels. Prediabetes - A1c reviewed. Continue healthy living habits. Suggested low carb dieting.     AAA - I have deleted this diagnosis based on his vascular surgeon's notes. Aortic valve insufficiency / Incomplete RBBB  -  Repeat ECHO in 1-2 years according to cardiology notes. Cont to monitor. Obesity - based on BMI, cont diet and exercise. Arthritis left knee - may proceed in the future to TKR. Under care of [employer]. Patient understands our medical plan. Patient has provided input and agrees with goals. Alternatives have been explained and offered. Risks/benefits and significant side effects of medications have been reviewed. Patient encouraged to review package inserts for any medications. All questions answered. The patient is to call if condition worsens or fails to improve. Follow-up Disposition:  Return in about 6 months (around 2/16/2018) for routine care. A1c to be done in office .

## 2017-08-16 NOTE — PROGRESS NOTES
Chief Complaint   Patient presents with    Cholesterol Problem    Other     prediabetes and hypocalcemia    Other     AAA, RBBB, aortic valve insufficiency, and PAC-sees cardiology (Dr. Titus Pham)     1. Have you been to the ER, urgent care clinic since your last visit? Hospitalized since your last visit? No    2. Have you seen or consulted any other health care providers outside of the [address] [place] [address] since your last visit? Include any pap smears or colon screening.  Yes Where: Dr. Titus Pham    Zostavax: has not had

## 2017-09-15 ENCOUNTER — OFFICE VISIT (OUTPATIENT)
Dept: CARDIOLOGY CLINIC | Age: 62
End: 2017-09-15

## 2017-09-15 VITALS
HEIGHT: 74 IN | HEART RATE: 66 BPM | SYSTOLIC BLOOD PRESSURE: 116 MMHG | BODY MASS INDEX: 30.93 KG/M2 | DIASTOLIC BLOOD PRESSURE: 84 MMHG | WEIGHT: 241 LBS | OXYGEN SATURATION: 98 %

## 2017-09-15 DIAGNOSIS — I35.1 AORTIC VALVE REGURGITATION, UNSPECIFIED ETIOLOGY: ICD-10-CM

## 2017-09-15 DIAGNOSIS — E78.00 HYPERCHOLESTEROLEMIA: Primary | ICD-10-CM

## 2017-09-15 DIAGNOSIS — I45.10 RBBB: ICD-10-CM

## 2017-09-15 NOTE — PROGRESS NOTES
1. Have you been to the ER, urgent care clinic since your last visit? Hospitalized since your last visit? No     2. Have you seen or consulted any other health care providers outside of the [facility] since your last visit? Include any pap smears or colon screening.  No

## 2017-09-15 NOTE — PATIENT INSTRUCTIONS
Continue current medications. No changes.    If you have any further questions or concerns, please contact our office. [phone]

## 2017-09-15 NOTE — MR AVS SNAPSHOT
Visit Information Date & Time Provider Department Dept. Phone Encounter #  
 9/15/2017  2:40 PM Brandi Bob MD [facility] [address] [id number] Your Appointments 2/21/2018  4:00 PM  
ROUTINE CARE with Florentino Huynh MD  
[address] [facility] ([facility]) Appt Note: routine f/u 6mo [address] [facility]  
[address] [facility] [address] [facility] [address] Upcoming Health Maintenance Date Due ZOSTER VACCINE AGE 60> 11/10/2014 INFLUENZA AGE 9 TO ADULT 8/1/2017 DTaP/Tdap/Td series (2 - Td) 6/29/2021 COLONOSCOPY 4/5/2027 Allergies as of 9/15/2017  Review Complete On: 8/16/2017 By: Florentino Huynh MD  
  
 Severity Noted Reaction Type Reactions Simvastatin High 02/22/2016   Intolerance Myalgia Current Immunizations  Reviewed on 8/17/2016 Name Date Influenza Vaccine 9/8/2016, 8/19/2015, 9/17/2014 Influenza Vaccine Split 10/17/2012 TDAP Vaccine 6/29/2011 Not reviewed this visit You Were Diagnosed With   
  
 Codes Comments RBBB    -  Primary ICD-10-CM: I45.10 ICD-9-CM: 426.4 Aortic valve regurgitation, unspecified etiology     ICD-10-CM: I35.1 ICD-9-CM: 424.1 Hypercholesterolemia     ICD-10-CM: E78.00 ICD-9-CM: 272.0 PAC (premature atrial contraction)     ICD-10-CM: I49.1 ICD-9-CM: 427.61 Vitals BP Pulse Height(growth percentile) Weight(growth percentile) SpO2 BMI  
 116/84 66 6' 2\" (1.88 m) 241 lb (109.3 kg) 98% 30.94 kg/m2 Smoking Status Never Smoker Vitals History BMI and BSA Data Body Mass Index Body Surface Area 30.94 kg/m 2 2.39 m 2 Preferred Pharmacy Pharmacy Name Phone [facility] [address] [address] [facility] 2/3 [phone] Your Updated Medication List  
  
   
 This list is accurate as of: 9/15/17  2:51 PM.  Always use your most recent med list.  
  
  
  
  
 EFFEXOR  mg capsule Generic drug:  venlafaxine-SR Take 150 mg by mouth daily. Indications: ANXIETY WITH DEPRESSION  
  
 rosuvastatin 5 mg tablet Commonly known as:  CRESTOR Take 1 Tab by mouth nightly. We Performed the Following AMB POC EKG ROUTINE W/ 12 LEADS, INTER & REP [56690 CPT(R)] Introducing [facility]! Dear Xavi Verduzco: Thank you for requesting a Leeo account. Our records indicate that you already have an active Leeo account. You can access your account anytime at [email or web address] Did you know that you can access your hospital and ER discharge instructions at any time in Leeo? You can also review all of your test results from your hospital stay or ER visit. Additional Information If you have questions, please visit the Frequently Asked Questions section of the Leeo website at [email or web address] Remember, Leeo is NOT to be used for urgent needs. For medical emergencies, dial 911. Now available from your iPhone and Android! Please provide this summary of care documentation to your next provider. Your primary care clinician is listed as Nan Whittington. If you have any questions after today's visit, please call [phone].

## 2017-09-15 NOTE — PROGRESS NOTES
HISTORY OF PRESENT ILLNESS  Torri Ziegler is a 58 y.o. male. Hypertension   Pertinent negatives include no chest pain, no abdominal pain, no headaches and no shortness of breath. Cholesterol Problem   Pertinent negatives include no chest pain, no abdominal pain, no headaches and no shortness of breath. Patient presents for a follow-up office visit. He has a past medical history significant for dyslipidemia and atrial premature contractions. More recently he was discovered to have a small abdominal aortic aneurysm measuring 2.5 cm in diameter. He also discovered that he has a family history for premature coronary disease. He underwent a regular exercise treadmill stress test in August 2014 which was negative at 6 minutes total exercise time. He underwent an echocardiogram in March 2016 which showed preserved LV systolic function, EF 47-96% with grade 1 diastolic dysfunction and mild aortic valve insufficiency. He was started on Crestor 5 mg daily at last visit. He then had a follow-up lipid panel in August 2017 which showed a significant improvement in his total cholesterol and his LDL. His LDL had decreased from 186 down to 118. He is tolerating the Crestor without side effects. He denies any new chest pain or shortness of breath. No leg swelling, orthopnea, PND. No palpitations, dizziness or syncope. No major change in his activity level. However, he admits he does not exercise. Past Medical History:   Diagnosis Date    Anxiety     Cardiac echocardiogram 03/04/2016    EF 55-60%. No RWMA. Gr 1 DDfx. Mild AI. No significant valvular heart disease.  Cardiac exercise stress test 08/01/2014    Neg EKG on maximal treadmill stress test.  One 6-beat episode of NSVT. Ex time 6 min.  Cardiovascular aorto-iliac duplex 03/12/2015    No AAA. Patent bilateral CIAs. Patent renal arteries.     Hearing loss     Hemorrhoids     Hypercholesterolemia     has not tolerated some statins in the past    Inguinal hernia, right     Lumbar herniated disc     L4-5, Dr. Noam Dwyer PAC (premature atrial contraction)     Prediabetes     S/P colonoscopy 1/29/07    Dr. John Parker, no polyps    S/P colonoscopy 04/05/2017    Moderate diverticulosis of the sigmoid colon / repeat in 10 years per report      Current Outpatient Prescriptions   Medication Sig Dispense Refill    rosuvastatin (CRESTOR) 5 mg tablet Take 1 Tab by mouth nightly. 30 Tab 6    venlafaxine-SR (EFFEXOR XR) 150 mg capsule Take 150 mg by mouth daily. Indications: ANXIETY WITH DEPRESSION         Allergies   Allergen Reactions    Simvastatin Myalgia        Social History   Substance Use Topics    Smoking status: Never Smoker    Smokeless tobacco: Never Used    Alcohol use 0.0 oz/week     0 Cans of beer per week          Review of Systems   Constitutional: Negative for chills, fever and weight loss. HENT: Negative for nosebleeds. Eyes: Negative for blurred vision and double vision. Respiratory: Negative for cough, shortness of breath and wheezing. Cardiovascular: Negative for chest pain, palpitations, orthopnea, claudication, leg swelling and PND. Gastrointestinal: Negative for abdominal pain, heartburn, nausea and vomiting. Genitourinary: Negative for dysuria and hematuria. Musculoskeletal: Negative for falls, joint pain and myalgias. Skin: Negative for rash. Neurological: Negative for dizziness, focal weakness and headaches. Endo/Heme/Allergies: Does not bruise/bleed easily. Psychiatric/Behavioral: Negative for substance abuse. Visit Vitals    /84    Pulse 66    Ht 6' 2\" (1.88 m)    Wt 109.3 kg (241 lb)    SpO2 98%    BMI 30.94 kg/m2      Physical Exam   Constitutional: He is oriented to person, place, and time. He appears well-developed and well-nourished. HENT:   Head: Normocephalic and atraumatic. Eyes: Conjunctivae are normal.   Neck: Neck supple. No JVD present.  Carotid bruit is not present. Cardiovascular: Normal rate, regular rhythm, S1 normal, S2 normal and normal pulses. No extrasystoles are present. Exam reveals no gallop and no S3. No murmur heard. Pulmonary/Chest: Breath sounds normal. He has no wheezes. He has no rales. Abdominal: Soft. Bowel sounds are normal. There is no tenderness. Musculoskeletal: He exhibits no edema. Neurological: He is alert and oriented to person, place, and time. Skin: Skin is warm and dry. EKG: Normal sinus rhythm, leftward axis, right bundle branch block, no ST or T wave abnormalities cncerning for ischemia. Compared to his previous EKG, right bundle branch block has replaced an incomplete right bundle branch block. ASSESSMENT and PLAN    Dyslipidemia. Patient is tolerating low-dose Crestor at 5 mg daily. His lipid panel significantly improved on this regimen. His LDL went from 186 down to 118. His LFTs remain normal.  I would continue this regimen in addition to lifestyle modification. Right bundle branch block. This has been present on intermittent EKGs in the past.  Normal LV and RV function on echocardiogram back in 2016. No further workup needed. Aortic valve insufficiency. This was only mild on a recent echocardiogram in 2016 and can be reevaluated in 2-3 years as long as he does not develop any new symptoms. Very Small AAA. Patient is scheduled to follow-up with vascular surgery. Followup in 6 months as previously scheduled.

## 2017-10-04 RX ORDER — ROSUVASTATIN CALCIUM 5 MG/1
5 TABLET, COATED ORAL
Qty: 90 TAB | Refills: 3 | Status: SHIPPED | OUTPATIENT
Start: 2017-10-04 | End: 2018-08-21 | Stop reason: SDUPTHER

## 2017-10-18 ENCOUNTER — OFFICE VISIT (OUTPATIENT)
Dept: FAMILY MEDICINE CLINIC | Age: 62
End: 2017-10-18

## 2017-10-18 VITALS
OXYGEN SATURATION: 97 % | HEIGHT: 74 IN | BODY MASS INDEX: 30.29 KG/M2 | HEART RATE: 71 BPM | RESPIRATION RATE: 16 BRPM | TEMPERATURE: 98.8 F | DIASTOLIC BLOOD PRESSURE: 80 MMHG | WEIGHT: 236 LBS | SYSTOLIC BLOOD PRESSURE: 120 MMHG

## 2017-10-18 DIAGNOSIS — Z23 ENCOUNTER FOR IMMUNIZATION: ICD-10-CM

## 2017-10-18 DIAGNOSIS — R25.1 TREMOR: ICD-10-CM

## 2017-10-18 DIAGNOSIS — R19.05 PERIUMBILICAL MASS: Primary | ICD-10-CM

## 2017-10-18 RX ORDER — NAPROXEN 250 MG/1
250 TABLET ORAL
COMMUNITY
End: 2022-02-18

## 2017-10-18 RX ORDER — EFINACONAZOLE 100 MG/ML
SOLUTION TOPICAL DAILY
COMMUNITY
Start: 2017-09-18 | End: 2022-02-09

## 2017-10-18 NOTE — PROGRESS NOTES
SUBJECTIVE  Chief Complaint   Patient presents with    Other     c/o lump/knot/nodule on abdomen noticeable for about 6-8 months      Patient presents for feeling a lump adjacent to his belly button for the past 6-8 months. Recently was a bit tender. He says his wife was a bit concerned. He has no pain with straining. He also has had this tremor which seems to have affected his handwriting. He has an appointment with a neurologist this Friday. He has no family history of similar. He says he has had some mild memory loss lately but wonders if it is normal forgetfulness. OBJECTIVE    Blood pressure 120/80, pulse 71, temperature 98.8 °F (37.1 °C), temperature source Oral, resp. rate 16, height 6' 2\" (1.88 m), weight 236 lb (107 kg), SpO2 97 %. General:  Alert, cooperative, well appearing, in no apparent distress. Skin:  No rashes on abdomen. Abd: soft, nontender. Difficult to palpate anty discrete masses. His umbilicus is ever so slightly bulging on the right but it is so minimal, nontender, and no defect is palpable so I think this is his normal anatomy. The area he identifies as having a lump is a few centimeters superolateral to that. Neuro:  Finger to nose intact with a slight sideways movement oscillating at the end. Heel to toe walking is normal.  He has no tremor when holding hands still. Psych: normal affect. Mood good. Oriented x 3. Judgement and insight intact. ASSESSMENT / PLAN    ICD-10-CM ICD-9-CM    1. Periumbilical mass R10.55 430.89    2. Tremor R25.1 781.0    3. Encounter for immunization Z23 V03.89 ND IMMUNIZ ADMIN,1 SINGLE/COMB VAC/TOXOID      INFLUENZA VIRUS VAC QUAD,SPLIT,PRESV FREE SYRINGE IM     Periumbilical mass - difficulty palpating anything discrete. This likely is a lipoma. He will monitor this for now. We did discuss umbilical hernias and such. Tremor - he has an appt with neurology Friday. I will be curious to see what they think.   He has some signs that this may be an intention tremor. Flu vaccine administered. All chart history elements were reviewed by me at the time of the visit even though marked at time of note closure. Patient understands our medical plan. Patient has provided input and agrees with goals. Alternatives have been explained and offered. All questions answered. The patient is to call if condition worsens or fails to improve. Follow-up Disposition:  Return Wednesday, February 21, 2018 04:00 PM  routine care (already scheduled).

## 2017-10-18 NOTE — PROGRESS NOTES
Physician order obtained. Patient completed adult immunization consent form. Allergies, contraindications and recommendations reviewed with patient. Seasonal influenza vaccine administered IM left deltoid. Patient tolerated well. Patient remained in office for 15 minutes after injection and no adverse reactions were noted.

## 2017-10-18 NOTE — MR AVS SNAPSHOT
Visit Information Date & Time Provider Department Dept. Phone Encounter #  
 10/18/2017  6:45 PM Valeriano Cruz, [address] [id number] Follow-up Instructions Return Wednesday, February 21, 2018 04:00 PM  routine care (already scheduled). Your Appointments 2/21/2018  4:00 PM  
ROUTINE CARE with Valeriano Cruz MD  
[facility] ([address]) Appt Note: routine f/u 6mo [address] [facility] [address] [facility] [address] [place] [address]  
  
    
 3/16/2018  4:00 PM  
Follow Up with Cezar Meyer MD  
[facility] ([address]) Appt Note: 6 month follow up [place] [address] [address] [address] [phone] [address] [place] [address] [address] Upcoming Health Maintenance Date Due ZOSTER VACCINE AGE 60> 11/10/2014 DTaP/Tdap/Td series (2 - Td) 6/29/2021 COLONOSCOPY 4/5/2027 Allergies as of 10/18/2017  Review Complete On: 10/18/2017 By: Marco Redding Severity Noted Reaction Type Reactions Simvastatin High 02/22/2016   Intolerance Myalgia Current Immunizations  Reviewed on 10/18/2017 Name Date Influenza Vaccine 9/8/2016, 8/19/2015, 9/17/2014 Influenza Vaccine (Quad) PF 10/18/2017 Influenza Vaccine Split 10/17/2012 TDAP Vaccine 6/29/2011 Reviewed by Marco Redding on 10/18/2017 at  6:31 PM  
You Were Diagnosed With   
  
 Codes Comments Periumbilical mass    -  Primary ICD-10-CM: R19.05 
ICD-9-CM: 789.35 Tremor     ICD-10-CM: R25.1 ICD-9-CM: 781.0 Encounter for immunization     ICD-10-CM: B80 ICD-9-CM: V03.89 Vitals BP Pulse Temp Resp Height(growth percentile) Weight(growth percentile)  120/80 (BP 1 Location: Left arm, BP Patient Position: Sitting) 71 98.8 °F (37.1 °C) (Oral) 16 6' 2\" (1.88 m) 236 lb (107 kg) SpO2 BMI Smoking Status 97% 30.3 kg/m2 Never Smoker Vitals History BMI and BSA Data Body Mass Index Body Surface Area  
 30.3 kg/m 2 2.36 m 2 Preferred Pharmacy Pharmacy Name Phone [address] [phone] Your Updated Medication List  
  
   
This list is accurate as of: 10/18/17  7:04 PM.  Always use your most recent med list.  
  
  
  
  
 EFFEXOR  mg capsule Generic drug:  venlafaxine-SR Take 150 mg by mouth daily. Indications: ANXIETY WITH DEPRESSION JUBLIA Oralia topical solution Generic drug:  efinaconazole  
  
 naproxen 250 mg tablet Commonly known as:  NAPROSYN Take 250 mg by mouth every eight (8) hours as needed. rosuvastatin 5 mg tablet Commonly known as:  CRESTOR Take 1 Tab by mouth nightly. We Performed the Following INFLUENZA VIRUS VAC QUAD,SPLIT,PRESV FREE SYRINGE IM [id number] CPT(R)] NJ IMMUNIZ ADMIN,1 SINGLE/COMB VAC/TOXOID [id number] CPT(R)] Follow-up Instructions Return Wednesday, February 21, 2018 04:00 PM  routine care (already scheduled). Patient Instructions A Healthy Lifestyle: Care Instructions Your Care Instructions A healthy lifestyle can help you feel good, stay at a healthy weight, and have plenty of energy for both work and play. A healthy lifestyle is something you can share with your whole family. A healthy lifestyle also can lower your risk for serious health problems, such as high blood pressure, heart disease, and diabetes. You can follow a few steps listed below to improve your health and the health of your family. Follow-up care is a key part of your treatment and safety. Be sure to make and go to all appointments, and call your doctor if you are having problems.  Its also a good idea to know your test results and keep a list of the medicines you take. How can you care for yourself at home? · Do not eat too much sugar, fat, or fast foods. You can still have dessert and treats now and then. The goal is moderation. · Start small to improve your eating habits. Pay attention to portion sizes, drink less juice and soda pop, and eat more fruits and vegetables. ¨ Eat a healthy amount of food. A 3-ounce serving of meat, for example, is about the size of a deck of cards. Fill the rest of your plate with vegetables and whole grains. ¨ Limit the amount of soda and sports drinks you have every day. Drink more water when you are thirsty. ¨ Eat at least 5 servings of fruits and vegetables every day. It may seem like a lot, but it is not hard to reach this goal. A serving or helping is 1 piece of fruit, 1 cup of vegetables, or 2 cups of leafy, raw vegetables. Have an apple or some carrot sticks as an afternoon snack instead of a candy bar. Try to have fruits and/or vegetables at every meal. 
· Make exercise part of your daily routine. You may want to start with simple activities, such as walking, bicycling, or slow swimming. Try to be active 30 to 60 minutes every day. You do not need to do all 30 to 60 minutes all at once. For example, you can exercise 3 times a day for 10 or 20 minutes. Moderate exercise is safe for most people, but it is always a good idea to talk to your doctor before starting an exercise program. 
· Keep moving. Robbie Cluck the lawn, work in the garden, or [employer]. Take the stairs instead of the elevator at work. · If you smoke, quit. People who smoke have an increased risk for heart attack, stroke, cancer, and other lung illnesses. Quitting is hard, but there are ways to boost your chance of quitting tobacco for good. ¨ Use nicotine gum, patches, or lozenges. ¨ Ask your doctor about stop-smoking programs and medicines. ¨ Keep trying.  
In addition to reducing your risk of diseases in the future, you will notice some benefits soon after you stop using tobacco. If you have shortness of breath or asthma symptoms, they will likely get better within a few weeks after you quit. · Limit how much alcohol you drink. Moderate amounts of alcohol (up to 2 drinks a day for men, 1 drink a day for women) are okay. But drinking too much can lead to liver problems, high blood pressure, and other health problems. Family health If you have a family, there are many things you can do together to improve your health. · Eat meals together as a family as often as possible. · Eat healthy foods. This includes fruits, vegetables, lean meats and dairy, and whole grains. · Include your family in your fitness plan. Most people think of activities such as jogging or tennis as the way to fitness, but there are many ways you and your family can be more active. Anything that makes you breathe hard and gets your heart pumping is exercise. Here are some tips: 
¨ Walk to do errands or to take your child to school or the bus. ¨ Go for a family bike ride after dinner instead of watching TV. Where can you learn more? Go to http://charoStephen L. LaFrance Pharmacyprimo.info/. Enter N748 in the search box to learn more about \"A Healthy Lifestyle: Care Instructions. \" Current as of: July 26, 2016 Content Version: 11.3 © 8713-7702 [employer]. Care instructions adapted under license by Kind[employer] (which disclaims liability or warranty for this information). If you have questions about a medical condition or this instruction, always ask your healthcare professional. [place] [address] any warranty or liability for your use of this information. Introducing [facility]! Dear Veronica Edward: Thank you for requesting a ZipList account. Our records indicate that you already have an active ZipList account. You can access your account anytime at [email or web address]. Oh BiBi/REPUBLIC RESOURCES Did you know that you can access your hospital and ER discharge instructions at any time in PlayerPro? You can also review all of your test results from your hospital stay or ER visit. Additional Information If you have questions, please visit the Frequently Asked Questions section of the PlayerPro website at [email or web address] Remember, PlayerPro is NOT to be used for urgent needs. For medical emergencies, dial 911. Now available from your iPhone and Android! Please provide this summary of care documentation to your next provider. Your primary care clinician is listed as Parisa Wan. If you have any questions after today's visit, please call [phone].

## 2017-10-18 NOTE — PATIENT INSTRUCTIONS

## 2017-10-18 NOTE — PROGRESS NOTES
1. Have you been to the ER, urgent care clinic since your last visit? Hospitalized since your last visit? No    2. Have you seen or consulted any other health care providers outside of the [address] [place] [address] since your last visit? Include any pap smears or colon screening.  No

## 2017-11-09 ENCOUNTER — HOSPITAL ENCOUNTER (OUTPATIENT)
Dept: MRI IMAGING | Age: 62
Discharge: HOME | End: 2017-11-09
Attending: PSYCHIATRY & NEUROLOGY
Payer: COMMERCIAL

## 2017-11-09 DIAGNOSIS — G25.0 ESSENTIAL TREMOR: ICD-10-CM

## 2017-11-09 PROCEDURE — 70551 MRI BRAIN STEM W/O DYE: CPT

## 2017-12-26 ENCOUNTER — HOSPITAL ENCOUNTER (OUTPATIENT)
Dept: LAB | Age: 62
Discharge: HOME | End: 2017-12-26
Payer: COMMERCIAL

## 2017-12-26 LAB
ERYTHROCYTE [SEDIMENTATION RATE] IN BLOOD: 1 MM/HR (ref 0–20)
FOLATE SERPL-MCNC: 7 NG/ML (ref 3.1–17.5)
RPR SER QL: NONREACTIVE
TSH SERPL DL<=0.05 MIU/L-ACNC: 1.82 UIU/ML (ref 0.36–3.74)
VIT B12 SERPL-MCNC: 192 PG/ML (ref 211–911)

## 2017-12-26 PROCEDURE — 86592 SYPHILIS TEST NON-TREP QUAL: CPT | Performed by: PSYCHIATRY & NEUROLOGY

## 2017-12-26 PROCEDURE — 84443 ASSAY THYROID STIM HORMONE: CPT | Performed by: PSYCHIATRY & NEUROLOGY

## 2017-12-26 PROCEDURE — 82607 VITAMIN B-12: CPT | Performed by: PSYCHIATRY & NEUROLOGY

## 2017-12-26 PROCEDURE — 36415 COLL VENOUS BLD VENIPUNCTURE: CPT | Performed by: PSYCHIATRY & NEUROLOGY

## 2017-12-26 PROCEDURE — 86038 ANTINUCLEAR ANTIBODIES: CPT | Performed by: PSYCHIATRY & NEUROLOGY

## 2017-12-26 PROCEDURE — 85652 RBC SED RATE AUTOMATED: CPT | Performed by: PSYCHIATRY & NEUROLOGY

## 2017-12-27 LAB — ANA TITR SER IF: NEGATIVE {TITER}

## 2018-02-21 ENCOUNTER — OFFICE VISIT (OUTPATIENT)
Dept: FAMILY MEDICINE CLINIC | Age: 63
End: 2018-02-21

## 2018-02-21 VITALS
RESPIRATION RATE: 14 BRPM | WEIGHT: 240 LBS | HEART RATE: 71 BPM | DIASTOLIC BLOOD PRESSURE: 80 MMHG | HEIGHT: 74 IN | OXYGEN SATURATION: 95 % | BODY MASS INDEX: 30.8 KG/M2 | SYSTOLIC BLOOD PRESSURE: 114 MMHG | TEMPERATURE: 98.3 F

## 2018-02-21 DIAGNOSIS — R73.03 PREDIABETES: Primary | ICD-10-CM

## 2018-02-21 DIAGNOSIS — E53.8 VITAMIN B12 DEFICIENCY: ICD-10-CM

## 2018-02-21 DIAGNOSIS — E78.00 HYPERCHOLESTEROLEMIA: ICD-10-CM

## 2018-02-21 DIAGNOSIS — G25.0 BENIGN ESSENTIAL TREMOR: ICD-10-CM

## 2018-02-21 DIAGNOSIS — E66.9 OBESITY, UNSPECIFIED CLASSIFICATION, UNSPECIFIED OBESITY TYPE, UNSPECIFIED WHETHER SERIOUS COMORBIDITY PRESENT: ICD-10-CM

## 2018-02-21 DIAGNOSIS — I35.1 AORTIC VALVE INSUFFICIENCY, ETIOLOGY OF CARDIAC VALVE DISEASE UNSPECIFIED: ICD-10-CM

## 2018-02-21 DIAGNOSIS — Z12.5 PROSTATE CANCER SCREENING: ICD-10-CM

## 2018-02-21 DIAGNOSIS — I45.10 RBBB: ICD-10-CM

## 2018-02-21 DIAGNOSIS — I49.1 PAC (PREMATURE ATRIAL CONTRACTION): ICD-10-CM

## 2018-02-21 LAB — HBA1C MFR BLD HPLC: 5.7 %

## 2018-02-21 RX ORDER — MAGNESIUM 200 MG
1000 TABLET ORAL DAILY
Qty: 90 TAB | Refills: 3 | Status: SHIPPED | OUTPATIENT
Start: 2018-02-21 | End: 2019-03-19 | Stop reason: SDUPTHER

## 2018-02-21 RX ORDER — PROPRANOLOL HYDROCHLORIDE 80 MG/1
1 CAPSULE, EXTENDED RELEASE ORAL DAILY
COMMUNITY
Start: 2018-01-29 | End: 2020-04-07 | Stop reason: SDUPTHER

## 2018-02-21 NOTE — MR AVS SNAPSHOT
[address] [facility] [address] [facility] 
[phone] Patient: Elicia Blake MRN: [id number] ILS:[birth year] Visit Information Date & Time Provider Department Dept. Phone Encounter #  
 2/21/2018  4:00 PM Felecia Melendez, [address] [id number] Follow-up Instructions Return in about 6 months (around 8/21/2018) for  routine care and please have non-fasting labs done prior. Your Appointments 3/16/2018  4:00 PM  
Follow Up with John Barnard MD  
[facility] ([address]) Appt Note: 6 month follow up [place] [address]  
[phone] [address] Upcoming Health Maintenance Date Due ZOSTER VACCINE AGE 60> 11/10/2014 DTaP/Tdap/Td series (2 - Td) 6/29/2021 COLONOSCOPY 4/5/2027 Allergies as of 2/21/2018  Review Complete On: 2/21/2018 By: Felecia Melendez MD  
  
 Severity Noted Reaction Type Reactions Simvastatin High 02/22/2016   Intolerance Myalgia Current Immunizations  Reviewed on 10/18/2017 Name Date Influenza Vaccine 9/8/2016, 8/19/2015, 9/17/2014 Influenza Vaccine (Quad) PF 10/18/2017 Influenza Vaccine Split 10/17/2012 TDAP Vaccine 6/29/2011 Not reviewed this visit You Were Diagnosed With   
  
 Codes Comments Prediabetes    -  Primary ICD-10-CM: R73.03 
ICD-9-CM: 790.29 Vitamin B12 deficiency     ICD-10-CM: E53.8 ICD-9-CM: 266.2 Hypercholesterolemia     ICD-10-CM: E78.00 ICD-9-CM: 272.0 PAC (premature atrial contraction)     ICD-10-CM: I49.1 ICD-9-CM: 427.61   
 RBBB     ICD-10-CM: I45.10 ICD-9-CM: 426.4 Aortic valve insufficiency, etiology of cardiac valve disease unspecified     ICD-10-CM: I35.1 ICD-9-CM: 424.1 Prostate cancer screening     ICD-10-CM: Z12.5 ICD-9-CM: V76.44 Vitals BP Pulse Temp Resp Height(growth percentile) Weight(growth percentile) 114/80 (BP 1 Location: Left arm, BP Patient Position: Sitting) 71 98.3 °F (36.8 °C) (Oral) 14 6' 2\" (1.88 m) 240 lb (108.9 kg) SpO2 BMI Smoking Status 95% 30.81 kg/m2 Never Smoker Vitals History BMI and BSA Data Body Mass Index Body Surface Area  
 30.81 kg/m 2 2.38 m 2 Preferred Pharmacy Pharmacy Name Phone [address] [place] [facility] [phone] Your Updated Medication List  
  
   
This list is accurate as of 2/21/18  4:21 PM.  Always use your most recent med list.  
  
  
  
  
 cyanocobalamin 1,000 mcg sublingual tablet Commonly known as:  VITAMIN B-12 Take 1 Tab by mouth daily. EFFEXOR  mg capsule Generic drug:  venlafaxine-SR Take 150 mg by mouth daily. Indications: ANXIETY WITH DEPRESSION JUBLIA Oralia topical solution Generic drug:  efinaconazole  
  
 naproxen 250 mg tablet Commonly known as:  NAPROSYN Take 250 mg by mouth every eight (8) hours as needed. propranolol LA 80 mg SR capsule Commonly known as:  INDERAL LA Take 1 Cap by mouth daily. rosuvastatin 5 mg tablet Commonly known as:  CRESTOR Take 1 Tab by mouth nightly. Prescriptions Printed Refills  
 cyanocobalamin (VITAMIN B-12) 1,000 mcg sublingual tablet 3 Sig: Take 1 Tab by mouth daily. Class: Print Route: Oral  
  
We Performed the Following AMB POC HEMOGLOBIN A1C [65539 CPT(R)] Follow-up Instructions Return in about 6 months (around 8/21/2018) for  routine care and please have non-fasting labs done prior. To-Do List   
 02/21/2018 Lab:  CBC W/O DIFF   
  
 02/21/2018 Lab:  HEMOGLOBIN A1C W/O EAG   
  
 02/21/2018 Lab:  METABOLIC PANEL, COMPREHENSIVE   
  
 02/21/2018 Lab:  PSA, DIAGNOSTIC (PROSTATE SPECIFIC AG) 02/21/2018 Lab:  VITAMIN B12 Patient Instructions Vitamin B12: About This Test 
What is it? This blood test measures the amount of vitamin B12 in your blood. Your body needs this B vitamin to make blood cells and to keep your nervous system healthy. Why is this test done? This test is used to: · Check for vitamin B12 deficiency anemia, especially in those who have had stomach or intestinal surgery or who have small intestine problems or a family history of this anemia. · Diagnose the cause of certain types of anemia. · Help find the cause of dementia or other nervous system symptoms, such as tingling or numbness of the arms or legs. How can you prepare for the test? 
· In general, you don't need to prepare before having this test. Your doctor may give you some specific instructions. What happens during the test? 
· A health professional takes a sample of your blood. What else should you know about the test? 
· Your results will include an explanation of what a \"normal\" result is. This is called a \"reference range. \" It is just a guide. Your doctor will evaluate your results based on your health and other factors. This means that a value that falls outside the normal values listed may still be normal for you. · Vitamin B12 is usually measured at the same time as folic acid is tested, because a lack of either one can lead to a form of anemia called megaloblastic anemia. How long does the test take? · The test will take a few minutes. What happens after the test? 
· You will probably be able to go home right away. · You can go back to your usual activities right away. Follow-up care is a key part of your treatment and safety. Be sure to make and go to all appointments, and call your doctor if you are having problems. It's also a good idea to keep a list of the medicines you take. Ask your doctor when you can expect to have your test results. Where can you learn more? Go to http://charo-primo.info/. Enter Q213 in the search box to learn more about \"Vitamin B12: About This Test.\" Current as of: October 13, 2016 Content Version: 11.4 © 2776-4102 Nerve.com. Care instructions adapted under license by S[employer]ec [employer] (which disclaims liability or warranty for this information). If you have questions about a medical condition or this instruction, always ask your healthcare professional. [address] any warranty or liability for your use of this information. Follow up in 6 months for routine care and please have non-fasting labs done prior Introducing [facility]! Dear Shivani Hendrix: Thank you for requesting a Babybe account. Our records indicate that you already have an active Babybe account. You can access your account anytime at [email or web address]://Order [email or web address]. LaunchRock/Order Mapper Did you know that you can access your hospital and ER discharge instructions at any time in Babybe? You can also review all of your test results from your hospital stay or ER visit. Additional Information If you have questions, please visit the Frequently Asked Questions section of the Babybe website at [email or web address] [email or web address] [email or web address] Remember, Babybe is NOT to be used for urgent needs. For medical emergencies, dial 911. Now available from your iPhone and Android! Please provide this summary of care documentation to your next provider. Your primary care clinician is listed as Satish Zepeda. If you have any questions after today's visit, please call [phone].

## 2018-02-21 NOTE — PROGRESS NOTES
SUBJECTIVE  Chief Complaint   Patient presents with    Cholesterol Problem    Other     prediabetes    Other     RBBB and family h/o AAA    Obesity    Tremors     sees Omkar Saldaña7 Neurology; last OV 12/22/17; no f/u scheduled     Patient presents for follow-up on their prediabetes and hypercholesterolemia. He is due for an A1c today. He is seeing cardiology for management of cardiac risk factor modification. He is on a low dose Crestor 5mg daily. He has had a work-up for tremors in his hands. He has seen a neurologist and is doing well on Inderal.  He had labs showing a vit B deficiency and has started a supplement on his own. He is unsure of the dose. The neurologist diagnosed him with a benign essential tremor and was advised that there was no current concern about parkinson's. ROS:  History obtained from the patient  · Respiratory: no cough, shortness of breath, or wheezing  · Cardiovascular: no chest pain, palpitations, or dyspnea on exertion  · Gastrointestinal: no abdominal pain, N/V    OBJECTIVE  Blood pressure 114/80, pulse 71, temperature 98.3 °F (36.8 °C), temperature source Oral, resp. rate 14, height 6' 2\" (1.88 m), weight 240 lb (108.9 kg), SpO2 95 %. General:  alert, cooperative, well appearing, in no apparent distress. Ears:  Scant non-occluding cerumen bilaterally (less than 10% blockage and only peripheral). Neck:  No masses. No carotid bruits. No thyromegaly. CV:  The heart sounds are regular in rate and rhythm. There is a normal S1 and S2. There or no murmurs. Lungs: Inspiratory and expiratory efforts are full and unlabored. Lung sounds are clear and equal to auscultation throughout all lung fields without wheezing, rales, or rhonchi. Psych: normal affect. Mood good. Oriented x 3. Judgement and insight intact.      Results for orders placed or performed in visit on 02/21/18   AMB POC HEMOGLOBIN A1C   Result Value Ref Range    Hemoglobin A1c (POC) 5.7 % ASSESSMENT / PLAN    ICD-10-CM ICD-9-CM    1. Prediabetes R73.03 790.29 AMB POC HEMOGLOBIN A1C      HEMOGLOBIN A1C W/O EAG   2. Hypercholesterolemia E78.00 272.0 CBC W/O DIFF      METABOLIC PANEL, COMPREHENSIVE   3. PAC (premature atrial contraction) I49.1 427.61    4. RBBB I45.10 426.4    5. Aortic valve insufficiency, etiology of cardiac valve disease unspecified I35.1 424.1    6. Vitamin B12 deficiency E53.8 266.2 VITAMIN B12   7. Prostate cancer screening Z12.5 V76.44 PSA, DIAGNOSTIC (PROSTATE SPECIFIC AG)   8. Obesity, unspecified classification, unspecified obesity type, unspecified whether serious comorbidity present E66.9 278.00    9. Benign essential tremor G25.0 333.1      Prediabetes - A1c looks great. Continue healthy living habits. Hypercholesterolemia - Cont per cardiologist with Crestor 5mg nightly. Soon to have updated labs. PAC / RBBB/ aortic valve insufficiency - cont per cardiology. Notes reviewed. Aortic valve insufficiency-  Repeat ECHO in 1-2 years according to cardiology note. Vit B12 def - recommended vit B12 1,000mcg SL daily. Recheck in 6 months. Obesity - diet and exercise. Benign essential tremor - cont per neurology. We can prescribe his Inderal if needed. Patient understands our medical plan. Patient has provided input and agrees with goals. Alternatives have been explained and offered. Risks/benefits and significant side effects of medications have been reviewed. Patient encouraged to review package inserts for any medications. All questions answered. The patient is to call if condition worsens or fails to improve. Follow-up Disposition:  Return in about 6 months (around 8/21/2018) for  routine care and please have non-fasting labs done prior.

## 2018-02-21 NOTE — PATIENT INSTRUCTIONS
Vitamin B12: About This Test  What is it? This blood test measures the amount of vitamin B12 in your blood. Your body needs this B vitamin to make blood cells and to keep your nervous system healthy. Why is this test done? This test is used to:  · Check for vitamin B12 deficiency anemia, especially in those who have had stomach or intestinal surgery or who have small intestine problems or a family history of this anemia. · Diagnose the cause of certain types of anemia. · Help find the cause of dementia or other nervous system symptoms, such as tingling or numbness of the arms or legs. How can you prepare for the test?  · In general, you don't need to prepare before having this test. Your doctor may give you some specific instructions. What happens during the test?  · A health professional takes a sample of your blood. What else should you know about the test?  · Your results will include an explanation of what a \"normal\" result is. This is called a \"reference range. \" It is just a guide. Your doctor will evaluate your results based on your health and other factors. This means that a value that falls outside the normal values listed may still be normal for you. · Vitamin B12 is usually measured at the same time as folic acid is tested, because a lack of either one can lead to a form of anemia called megaloblastic anemia. How long does the test take? · The test will take a few minutes. What happens after the test?  · You will probably be able to go home right away. · You can go back to your usual activities right away. Follow-up care is a key part of your treatment and safety. Be sure to make and go to all appointments, and call your doctor if you are having problems. It's also a good idea to keep a list of the medicines you take. Ask your doctor when you can expect to have your test results. Where can you learn more? Go to http://charo-primo.info/.   Enter Q213 in the search box to learn more about \"Vitamin B12: About This Test.\"  Current as of: October 13, 2016  Content Version: 11.4  © 3956-0098 Healthwise, [employer]. Care instructions adapted under license by "rFactr, Inc." (which disclaims liability or warranty for this information). If you have questions about a medical condition or this instruction, always ask your healthcare professional. [address] any warranty or liability for your use of this information.         Follow up in 6 months for routine care and please have non-fasting labs done prior

## 2018-03-13 ENCOUNTER — HOSPITAL ENCOUNTER (OUTPATIENT)
Dept: LAB | Age: 63
Discharge: HOME | End: 2018-03-13
Payer: COMMERCIAL

## 2018-03-13 LAB
CHOLEST SERPL-MCNC: 201 MG/DL
HDLC SERPL-MCNC: 64 MG/DL (ref 40–60)
HDLC SERPL: 3.1 {RATIO} (ref 0–5)
LDLC SERPL CALC-MCNC: 118.2 MG/DL (ref 0–100)
LIPID PROFILE,FLP: ABNORMAL
TRIGL SERPL-MCNC: 94 MG/DL (ref ?–150)
VLDLC SERPL CALC-MCNC: 18.8 MG/DL

## 2018-03-13 PROCEDURE — 80061 LIPID PANEL: CPT | Performed by: INTERNAL MEDICINE

## 2018-03-13 PROCEDURE — 36415 COLL VENOUS BLD VENIPUNCTURE: CPT | Performed by: INTERNAL MEDICINE

## 2018-03-14 NOTE — PROGRESS NOTES
Per your last note \" Dyslipidemia. Patient is tolerating low-dose Crestor at 5 mg daily. His lipid panel significantly improved on this regimen. His LDL went from 186 down to 118. His LFTs remain normal.  I would continue this regimen in addition to lifestyle modification.

## 2018-03-16 ENCOUNTER — OFFICE VISIT (OUTPATIENT)
Dept: CARDIOLOGY CLINIC | Age: 63
End: 2018-03-16

## 2018-03-16 VITALS
OXYGEN SATURATION: 93 % | SYSTOLIC BLOOD PRESSURE: 118 MMHG | WEIGHT: 230 LBS | BODY MASS INDEX: 29.52 KG/M2 | DIASTOLIC BLOOD PRESSURE: 74 MMHG | HEIGHT: 74 IN | HEART RATE: 49 BPM

## 2018-03-16 DIAGNOSIS — I49.1 PAC (PREMATURE ATRIAL CONTRACTION): ICD-10-CM

## 2018-03-16 DIAGNOSIS — E78.00 HYPERCHOLESTEROLEMIA: Primary | ICD-10-CM

## 2018-03-16 DIAGNOSIS — I45.10 RBBB: ICD-10-CM

## 2018-03-16 DIAGNOSIS — I71.40 AAA (ABDOMINAL AORTIC ANEURYSM) WITHOUT RUPTURE: ICD-10-CM

## 2018-03-16 NOTE — PROGRESS NOTES
1. Have you been to the ER, urgent care clinic since your last visit? Hospitalized since your last visit? no    2. Have you seen or consulted any other health care providers outside of the [address] [place] [address] since your last visit? Include any pap smears or colon screening.  No

## 2018-03-16 NOTE — MR AVS SNAPSHOT
[address] [address] [address] [address] [phone] Patient: Francisco Andrade MRN: [id number] Visit Information Date & Time Provider Department Dept. Phone Encounter #  
 3/16/2018  4:00 PM Laz Schwab MD [facility] [address] [id number] Your Appointments 8/22/2018  4:30 PM  
FOLLOW UP EXAM with Mann Mederos MD  
2056 [facility] ([address]) Appt Note: 6mth f/u  
 [address] 706 [facility] [address] 1604 [facility] [place] [address] [place] [address] Upcoming Health Maintenance Date Due ZOSTER VACCINE AGE 60> 11/10/2014 DTaP/Tdap/Td series (2 - Td) 6/29/2021 COLONOSCOPY 4/5/2027 Allergies as of 3/16/2018  Review Complete On: 2/21/2018 By: Mann Mederos MD  
  
 Severity Noted Reaction Type Reactions Simvastatin High 02/22/2016   Intolerance Myalgia Current Immunizations  Reviewed on 10/18/2017 Name Date Influenza Vaccine 9/8/2016, 8/19/2015, 9/17/2014 Influenza Vaccine (Quad) PF 10/18/2017 Influenza Vaccine Split 10/17/2012 TDAP Vaccine 6/29/2011 Not reviewed this visit You Were Diagnosed With   
  
 Codes Comments PAC (premature atrial contraction)    -  Primary ICD-10-CM: I49.1 ICD-9-CM: 427.61 Vitals BP Pulse Height(growth percentile) Weight(growth percentile) SpO2 BMI  
 118/74 (!) 49 6' 2\" (1.88 m) 230 lb (104.3 kg) 93% 29.53 kg/m2 Smoking Status Never Smoker Vitals History BMI and BSA Data Body Mass Index Body Surface Area  
 29.53 kg/m 2 2.33 m 2 Preferred Pharmacy Pharmacy Name Phone [address] [facility] [phone] Your Updated Medication List  
  
   
 This list is accurate as of 3/16/18  4:40 PM.  Always use your most recent med list.  
  
  
  
  
 cyanocobalamin 1,000 mcg sublingual tablet Commonly known as:  VITAMIN B-12 Take 1 Tab by mouth daily. EFFEXOR  mg capsule Generic drug:  venlafaxine-SR Take 150 mg by mouth daily. Indications: ANXIETY WITH DEPRESSION JUBLIA Oralia topical solution Generic drug:  efinaconazole  
  
 naproxen 250 mg tablet Commonly known as:  NAPROSYN Take 250 mg by mouth every eight (8) hours as needed. propranolol LA 80 mg SR capsule Commonly known as:  INDERAL LA Take 1 Cap by mouth daily. rosuvastatin 5 mg tablet Commonly known as:  CRESTOR Take 1 Tab by mouth nightly. We Performed the Following AMB POC EKG ROUTINE W/ 12 LEADS, INTER & REP [04889 CPT(R)] Introducing [facility]! Dear Ronn Polanco: Thank you for requesting a [employer] account. Our records indicate that you already have an active [employer] account. You can access your account anytime at [email or web address] Did you know that you can access your hospital and ER discharge instructions at any time in [employer]? You can also review all of your test results from your hospital stay or ER visit. Additional Information If you have questions, please visit the Frequently Asked Questions section of the [employer] website at [email or web address] Remember, [employer] is NOT to be used for urgent needs. For medical emergencies, dial 911. Now available from your iPhone and Android! Please provide this summary of care documentation to your next provider. Your primary care clinician is listed as Casey Saucedo. If you have any questions after today's visit, please call [phone].

## 2018-03-28 ENCOUNTER — TELEPHONE (OUTPATIENT)
Dept: CARDIOLOGY CLINIC | Age: 63
End: 2018-03-28

## 2018-03-28 NOTE — TELEPHONE ENCOUNTER
----- Message from Melonie Morris MD sent at 3/15/2018  5:27 PM EDT -----  Please give the patient his lipid numbers. They are similar to the ones from previous panel. I would continue this regimen  ----- Message -----     From: Benna Rinne, RN     Sent: 3/14/2018  11:41 AM       To: Melonie Morris MD    Per your last note \" Dyslipidemia. Patient is tolerating low-dose Crestor at 5 mg daily. His lipid panel significantly improved on this regimen. His LDL went from 186 down to 118. His LFTs remain normal.  I would continue this regimen in addition to lifestyle modification.

## 2018-05-10 ENCOUNTER — OFFICE VISIT (OUTPATIENT)
Dept: FAMILY MEDICINE CLINIC | Age: 63
End: 2018-05-10

## 2018-05-10 VITALS
DIASTOLIC BLOOD PRESSURE: 70 MMHG | WEIGHT: 240 LBS | TEMPERATURE: 98.2 F | OXYGEN SATURATION: 97 % | RESPIRATION RATE: 16 BRPM | HEIGHT: 74 IN | HEART RATE: 76 BPM | SYSTOLIC BLOOD PRESSURE: 122 MMHG | BODY MASS INDEX: 30.8 KG/M2

## 2018-05-10 DIAGNOSIS — M62.08 DIASTASIS RECTI: Primary | ICD-10-CM

## 2018-05-10 NOTE — PATIENT INSTRUCTIONS
The Santiago Technique - look this up     A Healthy Lifestyle: Care Instructions  Your Care Instructions    A healthy lifestyle can help you feel good, stay at a healthy weight, and have plenty of energy for both work and play. A healthy lifestyle is something you can share with your whole family. A healthy lifestyle also can lower your risk for serious health problems, such as high blood pressure, heart disease, and diabetes. You can follow a few steps listed below to improve your health and the health of your family. Follow-up care is a key part of your treatment and safety. Be sure to make and go to all appointments, and call your doctor if you are having problems. It's also a good idea to know your test results and keep a list of the medicines you take. How can you care for yourself at home? · Do not eat too much sugar, fat, or fast foods. You can still have dessert and treats now and then. The goal is moderation. · Start small to improve your eating habits. Pay attention to portion sizes, drink less juice and soda pop, and eat more fruits and vegetables. ¨ Eat a healthy amount of food. A 3-ounce serving of meat, for example, is about the size of a deck of cards. Fill the rest of your plate with vegetables and whole grains. ¨ Limit the amount of soda and sports drinks you have every day. Drink more water when you are thirsty. ¨ Eat at least 5 servings of fruits and vegetables every day. It may seem like a lot, but it is not hard to reach this goal. A serving or helping is 1 piece of fruit, 1 cup of vegetables, or 2 cups of leafy, raw vegetables. Have an apple or some carrot sticks as an afternoon snack instead of a candy bar. Try to have fruits and/or vegetables at every meal.  · Make exercise part of your daily routine. You may want to start with simple activities, such as walking, bicycling, or slow swimming. Try to be active 30 to 60 minutes every day.  You do not need to do all 30 to 60 minutes all at once. For example, you can exercise 3 times a day for 10 or 20 minutes. Moderate exercise is safe for most people, but it is always a good idea to talk to your doctor before starting an exercise program.  · Keep moving. Smitha Gilsonine the lawn, work in the garden, or [employer]ed[employer]. Take the stairs instead of the elevator at work. · If you smoke, quit. People who smoke have an increased risk for heart attack, stroke, cancer, and other lung illnesses. Quitting is hard, but there are ways to boost your chance of quitting tobacco for good. ¨ Use nicotine gum, patches, or lozenges. ¨ Ask your doctor about stop-smoking programs and medicines. ¨ Keep trying. In addition to reducing your risk of diseases in the future, you will notice some benefits soon after you stop using tobacco. If you have shortness of breath or asthma symptoms, they will likely get better within a few weeks after you quit. · Limit how much alcohol you drink. Moderate amounts of alcohol (up to 2 drinks a day for men, 1 drink a day for women) are okay. But drinking too much can lead to liver problems, high blood pressure, and other health problems. Family health  If you have a family, there are many things you can do together to improve your health. · Eat meals together as a family as often as possible. · Eat healthy foods. This includes fruits, vegetables, lean meats and dairy, and whole grains. · Include your family in your fitness plan. Most people think of activities such as jogging or tennis as the way to fitness, but there are many ways you and your family can be more active. Anything that makes you breathe hard and gets your heart pumping is exercise. Here are some tips:  ¨ Walk to do errands or to take your child to school or the bus. ¨ Go for a family bike ride after dinner instead of watching TV. Where can you learn more? Go to http://charo-primo.info/.   Enter V196 in the search box to learn more about \"A Healthy Lifestyle: Care Instructions. \"  Current as of: May 12, 2017  Content Version: 11.4  © 5809-7859 [employer]. Care instructions adapted under license by [employer] (which disclaims liability or warranty for this information). If you have questions about a medical condition or this instruction, always ask your healthcare professional. [address] any warranty or liability for your use of this information.     Please keep scheduled appointment with Dr. Julietta Apgar on 2018 for routine care

## 2018-05-10 NOTE — PROGRESS NOTES
1. Have you been to the ER, urgent care clinic since your last visit? Hospitalized since your last visit? No    2. Have you seen or consulted any other health care providers outside of the [address] [place] [address] since your last visit? Include any pap smears or colon screening.  No

## 2018-05-10 NOTE — PROGRESS NOTES
SUBJECTIVE  Chief Complaint   Patient presents with    Possible Hernia     c/o knot possible hernia of mid abdomen     Patient presents complaining of a bulge in the central abdomen above his umbilicus over the past several months. Says that this last week it was painful and he wanted to get it checked out. It is only painful if he presses on it. OBJECTIVE  Blood pressure 122/70, pulse 76, temperature 98.2 °F (36.8 °C), temperature source Oral, resp. rate 16, height 6' 2\" (1.88 m), weight 240 lb (108.9 kg), SpO2 97 %. General:  alert, cooperative, well appearing, in no apparent distress. GI:  The abdomen is soft with no tenderness. There is no rebound or guarding. There is no CVA or suprapubic tenderness. He has no hernias present. He has a diastasis rectii present. There are no midline muscular defects palpated. Skin: no rashes, no jaundice. Psych: normal affect. Mood good. Oriented x 3. Judgement and insight intact. ASSESSMENT / PLAN      ICD-10-CM ICD-9-CM    1. Diastasis recti M62.08 728.84      Discussed the diagnosis and recommendations which are conservative. 10 minutes of face to face time spent with the patient with at least 50% on counseling on above medical issues. All chart history elements were reviewed by me at the time of the visit even though marked at time of note closure. Patient understands our medical plan. Patient has provided input and agrees with goals. Alternatives have been explained and offered. All questions answered. The patient is to call if condition worsens or fails to improve. Follow-up Disposition:  Return in about 3 months (around 8/22/2018) for  routine care.

## 2018-05-10 NOTE — MR AVS SNAPSHOT
[address] [facility] 
[phone] Patient: Ya Bob MRN: [id number] ZGD:[id number] Visit Information Date & Time Provider Department Dept. Phone Encounter #  
 5/10/2018  1:45 PM Nichole Boxer, 503 [facility] [id number] Follow-up Instructions Return in about 3 months (around 8/22/2018) for  routine care. Your Appointments 5/10/2018  1:45 PM  
FOLLOW UP EXAM with Nichole Boxer, MD  
[facility] ([facility]) Appt Note: f/u knot of belly button per patient [address] [facility] [address] 200 [facility]  
[address]  
  
    
 8/22/2018  4:30 PM  
FOLLOW UP EXAM with Nichole Boxer, MD  
[facility] ([facility]) Appt Note: 6mth f/u  
 [address] [facility] [address] 200 [facility]  
[phone]  
  
    
 3/22/2019  4:00 PM  
Follow Up with Sunil Duran MD  
[facility] [address] ([facility]) Appt Note: 1 yr f/u with EKG  
 [address] [place] [address] [facility] [place] [address]  
[phone] [address] [place] [address] [address] Upcoming Health Maintenance Date Due ZOSTER VACCINE AGE 60> 11/10/2014 Influenza Age 5 to Adult 8/1/2018 DTaP/Tdap/Td series (2 - Td) 6/29/2021 COLONOSCOPY 4/5/2027 Allergies as of 5/10/2018  Review Complete On: 5/10/2018 By: Krista Head Severity Noted Reaction Type Reactions Simvastatin High 02/22/2016   Intolerance Myalgia Current Immunizations  Reviewed on 5/10/2018 Name Date Influenza Vaccine 9/8/2016, 8/19/2015, 9/17/2014 Influenza Vaccine (Quad) PF 10/18/2017 Influenza Vaccine Split 10/17/2012 TDAP Vaccine 6/29/2011  Reviewed by Krista Head on 5/10/2018 at 12:58 PM  
 You Were Diagnosed With   
  
 Codes Comments Diastasis recti    -  Primary ICD-10-CM: M62.08 
ICD-9-CM: 728.84 Vitals BP Pulse Temp Resp Height(growth percentile) Weight(growth percentile) 122/70 (BP 1 Location: Left arm, BP Patient Position: Sitting) 76 98.2 °F (36.8 °C) (Oral) 16 6' 2\" (1.88 m) 240 lb (108.9 kg) SpO2 BMI Smoking Status 97% 30.81 kg/m2 Never Smoker Vitals History BMI and BSA Data Body Mass Index Body Surface Area  
 30.81 kg/m 2 2.38 m 2 Preferred Pharmacy Pharmacy Name Phone Erick Lemus, [facility] [phone] Your Updated Medication List  
  
   
This list is accurate as of 5/10/18  1:41 PM.  Always use your most recent med list.  
  
  
  
  
 cyanocobalamin 1,000 mcg sublingual tablet Commonly known as:  VITAMIN B-12 Take 1 Tab by mouth daily. EFFEXOR  mg capsule Generic drug:  venlafaxine-SR Take 150 mg by mouth daily. Indications: ANXIETY WITH DEPRESSION JUBLIA Oralia topical solution Generic drug:  efinaconazole  
  
 naproxen 250 mg tablet Commonly known as:  NAPROSYN Take 250 mg by mouth every eight (8) hours as needed. propranolol LA 80 mg SR capsule Commonly known as:  INDERAL LA Take 1 Cap by mouth daily. rosuvastatin 5 mg tablet Commonly known as:  CRESTOR Take 1 Tab by mouth nightly. Follow-up Instructions Return in about 3 months (around 8/22/2018) for  routine care. Patient Instructions The Santiago Technique - look this up A Healthy Lifestyle: Care Instructions Your Care Instructions A healthy lifestyle can help you feel good, stay at a healthy weight, and have plenty of energy for both work and play. A healthy lifestyle is something you can share with your whole family.  
A healthy lifestyle also can lower your risk for serious health problems, such as high blood pressure, heart disease, and diabetes. You can follow a few steps listed below to improve your health and the health of your family. Follow-up care is a key part of your treatment and safety. Be sure to make and go to all appointments, and call your doctor if you are having problems. It's also a good idea to know your test results and keep a list of the medicines you take. How can you care for yourself at home? · Do not eat too much sugar, fat, or fast foods. You can still have dessert and treats now and then. The goal is moderation. · Start small to improve your eating habits. Pay attention to portion sizes, drink less juice and soda pop, and eat more fruits and vegetables. ¨ Eat a healthy amount of food. A 3-ounce serving of meat, for example, is about the size of a deck of cards. Fill the rest of your plate with vegetables and whole grains. ¨ Limit the amount of soda and sports drinks you have every day. Drink more water when you are thirsty. ¨ Eat at least 5 servings of fruits and vegetables every day. It may seem like a lot, but it is not hard to reach this goal. A serving or helping is 1 piece of fruit, 1 cup of vegetables, or 2 cups of leafy, raw vegetables. Have an apple or some carrot sticks as an afternoon snack instead of a candy bar. Try to have fruits and/or vegetables at every meal. 
· Make exercise part of your daily routine. You may want to start with simple activities, such as walking, bicycling, or slow swimming. Try to be active 30 to 60 minutes every day. You do not need to do all 30 to 60 minutes all at once. For example, you can exercise 3 times a day for 10 or 20 minutes. Moderate exercise is safe for most people, but it is always a good idea to talk to your doctor before starting an exercise program. 
· Keep moving. Michelle Bi the lawn, work in the garden, or [employer]. Take the stairs instead of the elevator at work. · If you smoke, quit. People who smoke have an increased risk for heart attack, stroke, cancer, and other lung illnesses. Quitting is hard, but there are ways to boost your chance of quitting tobacco for good. ¨ Use nicotine gum, patches, or lozenges. ¨ Ask your doctor about stop-smoking programs and medicines. ¨ Keep trying. In addition to reducing your risk of diseases in the future, you will notice some benefits soon after you stop using tobacco. If you have shortness of breath or asthma symptoms, they will likely get better within a few weeks after you quit. · Limit how much alcohol you drink. Moderate amounts of alcohol (up to 2 drinks a day for men, 1 drink a day for women) are okay. But drinking too much can lead to liver problems, high blood pressure, and other health problems. Family health If you have a family, there are many things you can do together to improve your health. · Eat meals together as a family as often as possible. · Eat healthy foods. This includes fruits, vegetables, lean meats and dairy, and whole grains. · Include your family in your fitness plan. Most people think of activities such as jogging or tennis as the way to fitness, but there are many ways you and your family can be more active. Anything that makes you breathe hard and gets your heart pumping is exercise. Here are some tips: 
¨ Walk to do errands or to take your child to school or the bus. ¨ Go for a family bike ride after dinner instead of watching TV. Where can you learn more? Go to http://charo-primo.info/. Enter W943 in the search box to learn more about \"A Healthy Lifestyle: Care Instructions. \" Current as of: May 12, 2017 Content Version: 11.4 © 9949-6434 [employer]. Care instructions adapted under license by [employer] (which disclaims liability or warranty for this information).  If you have questions about a medical condition or this instruction, always ask your healthcare professional. [address] any warranty or liability for your use of this information. Please keep scheduled appointment with Dr. Georgie Bustamante on 08/22/2018 for routine care Introducing [facility]! Dear Gage Mayo: Thank you for requesting a OM [employer] account. Our records indicate that you already have an active OM [employer] account. You can access your account anytime at [email or web address] Did you know that you can access your hospital and ER discharge instructions at any time in OM Latam? You can also review all of your test results from your hospital stay or ER visit. Additional Information If you have questions, please visit the Frequently Asked Questions section of the OM [employer] website at [email or web address] Remember, OM Latam is NOT to be used for urgent needs. For medical emergencies, dial 911. Now available from your iPhone and Android! Please provide this summary of care documentation to your next provider. Your primary care clinician is listed as Fe Steinberg. If you have any questions after today's visit, please call [phone].

## 2018-08-16 ENCOUNTER — HOSPITAL ENCOUNTER (OUTPATIENT)
Dept: LAB | Age: 63
Discharge: HOME | End: 2018-08-16
Payer: COMMERCIAL

## 2018-08-16 DIAGNOSIS — R73.03 PREDIABETES: ICD-10-CM

## 2018-08-16 DIAGNOSIS — E53.8 VITAMIN B12 DEFICIENCY: ICD-10-CM

## 2018-08-16 DIAGNOSIS — Z12.5 PROSTATE CANCER SCREENING: ICD-10-CM

## 2018-08-16 DIAGNOSIS — E78.00 HYPERCHOLESTEROLEMIA: ICD-10-CM

## 2018-08-16 LAB
ALBUMIN SERPL-MCNC: 3.8 G/DL (ref 3.4–5)
ALBUMIN/GLOB SERPL: 1.2 {RATIO} (ref 0.8–1.7)
ALP SERPL-CCNC: 50 U/L (ref 45–117)
ALT SERPL-CCNC: 42 U/L (ref 16–61)
ANION GAP SERPL CALC-SCNC: 6 MMOL/L (ref 3–18)
AST SERPL-CCNC: 32 U/L (ref 15–37)
BILIRUB SERPL-MCNC: 0.5 MG/DL (ref 0.2–1)
BUN SERPL-MCNC: 15 MG/DL (ref 7–18)
BUN/CREAT SERPL: 13 (ref 12–20)
CALCIUM SERPL-MCNC: 8.6 MG/DL (ref 8.5–10.1)
CHLORIDE SERPL-SCNC: 104 MMOL/L (ref 100–108)
CO2 SERPL-SCNC: 29 MMOL/L (ref 21–32)
CREAT SERPL-MCNC: 1.15 MG/DL (ref 0.6–1.3)
ERYTHROCYTE [DISTWIDTH] IN BLOOD BY AUTOMATED COUNT: 14.4 % (ref 11.6–14.5)
GLOBULIN SER CALC-MCNC: 3.2 G/DL (ref 2–4)
GLUCOSE SERPL-MCNC: 102 MG/DL (ref 74–99)
HBA1C MFR BLD: 6 % (ref 4.2–5.6)
HCT VFR BLD AUTO: 43.4 % (ref 36–48)
HGB BLD-MCNC: 15 G/DL (ref 13–16)
MCH RBC QN AUTO: 30.1 PG (ref 24–34)
MCHC RBC AUTO-ENTMCNC: 34.6 G/DL (ref 31–37)
MCV RBC AUTO: 87 FL (ref 74–97)
PLATELET # BLD AUTO: 183 K/UL (ref 135–420)
PMV BLD AUTO: 10 FL (ref 9.2–11.8)
POTASSIUM SERPL-SCNC: 4.3 MMOL/L (ref 3.5–5.5)
PROT SERPL-MCNC: 7 G/DL (ref 6.4–8.2)
PSA SERPL-MCNC: 0.7 NG/ML (ref 0–4)
RBC # BLD AUTO: 4.99 M/UL (ref 4.7–5.5)
SODIUM SERPL-SCNC: 139 MMOL/L (ref 136–145)
VIT B12 SERPL-MCNC: 456 PG/ML (ref 211–911)
WBC # BLD AUTO: 6.3 K/UL (ref 4.6–13.2)

## 2018-08-16 PROCEDURE — 83036 HEMOGLOBIN GLYCOSYLATED A1C: CPT | Performed by: FAMILY MEDICINE

## 2018-08-16 PROCEDURE — 36415 COLL VENOUS BLD VENIPUNCTURE: CPT | Performed by: FAMILY MEDICINE

## 2018-08-16 PROCEDURE — 82607 VITAMIN B-12: CPT | Performed by: FAMILY MEDICINE

## 2018-08-16 PROCEDURE — 85027 COMPLETE CBC AUTOMATED: CPT | Performed by: FAMILY MEDICINE

## 2018-08-16 PROCEDURE — 84153 ASSAY OF PSA TOTAL: CPT | Performed by: FAMILY MEDICINE

## 2018-08-16 PROCEDURE — 80053 COMPREHEN METABOLIC PANEL: CPT | Performed by: FAMILY MEDICINE

## 2018-08-22 RX ORDER — ROSUVASTATIN CALCIUM 5 MG/1
TABLET, COATED ORAL
Qty: 90 TAB | Refills: 3 | Status: SHIPPED | OUTPATIENT
Start: 2018-08-22 | End: 2019-08-19 | Stop reason: SDUPTHER

## 2018-09-19 ENCOUNTER — OFFICE VISIT (OUTPATIENT)
Dept: FAMILY MEDICINE CLINIC | Age: 63
End: 2018-09-19

## 2018-09-19 VITALS
DIASTOLIC BLOOD PRESSURE: 68 MMHG | HEART RATE: 78 BPM | BODY MASS INDEX: 30.16 KG/M2 | OXYGEN SATURATION: 98 % | SYSTOLIC BLOOD PRESSURE: 110 MMHG | TEMPERATURE: 97.7 F | RESPIRATION RATE: 14 BRPM | HEIGHT: 74 IN | WEIGHT: 235 LBS

## 2018-09-19 DIAGNOSIS — G25.0 BENIGN ESSENTIAL TREMOR: ICD-10-CM

## 2018-09-19 DIAGNOSIS — I49.1 PAC (PREMATURE ATRIAL CONTRACTION): ICD-10-CM

## 2018-09-19 DIAGNOSIS — Z23 ENCOUNTER FOR IMMUNIZATION: ICD-10-CM

## 2018-09-19 DIAGNOSIS — E66.9 OBESITY, UNSPECIFIED CLASSIFICATION, UNSPECIFIED OBESITY TYPE, UNSPECIFIED WHETHER SERIOUS COMORBIDITY PRESENT: ICD-10-CM

## 2018-09-19 DIAGNOSIS — K43.9 ABDOMINAL WALL HERNIA: ICD-10-CM

## 2018-09-19 DIAGNOSIS — I45.10 RBBB: ICD-10-CM

## 2018-09-19 DIAGNOSIS — E78.00 HYPERCHOLESTEROLEMIA: ICD-10-CM

## 2018-09-19 DIAGNOSIS — I35.1 AORTIC VALVE INSUFFICIENCY, ETIOLOGY OF CARDIAC VALVE DISEASE UNSPECIFIED: ICD-10-CM

## 2018-09-19 DIAGNOSIS — R73.03 PREDIABETES: Primary | ICD-10-CM

## 2018-09-19 NOTE — PROGRESS NOTES
SUBJECTIVE Chief Complaint Patient presents with  
 Other  
  prediabetes  Cholesterol Problem  Vitamin B12 Deficiency  Obesity  Other PAC-followed by cardiology; f/u 3/22/19  Results  
  lab results review Patient presents for follow-up on their prediabetes and hypercholesterolemia. He is retiring Nov 1st and is excited for that! He is seeing cardiology for management of cardiac risk factor modification. He is on a low dose Crestor 5mg daily. He has an appointment in March. He has had a work-up for tremors in his hands. He has seen a neurologist and is doing well on Inderal.  He had labs showing a vit B deficiency and has started a supplement on his own but his recent repeat labs show normalized levels. ROS: 
History obtained from the patient · Respiratory: no cough, shortness of breath, or wheezing · Cardiovascular: no chest pain, palpitations, or dyspnea on exertion · Gastrointestinal: no abdominal pain, N/V. Has felt worsening of his abd bulge and pain. Notices sometimes he feels a lump. OBJECTIVE Blood pressure 110/68, pulse 78, temperature 97.7 °F (36.5 °C), temperature source Oral, resp. rate 14, height 6' 2\" (1.88 m), weight 235 lb (106.6 kg), SpO2 98 %. General:  alert, cooperative, well appearing, in no apparent distress. CV:  The heart sounds are regular in rate and rhythm. There is a normal S1 and S2. There or no murmurs. Lungs: Inspiratory and expiratory efforts are full and unlabored. Lung sounds are clear and equal to auscultation throughout all lung fields without wheezing, rales, or rhonchi. Abd: just above his umbilicus there is a midline reducible hernia palpated. It is nontender at the moment. He also has a diastasis recti. Psych: normal affect. Mood good. Oriented x 3. Judgement and insight intact. Results for orders placed or performed during the hospital encounter of 08/16/18 PSA, DIAGNOSTIC (PROSTATE SPECIFIC AG) Result Value Ref Range Prostate Specific Ag 0.7 0.0 - 4.0 ng/mL CBC W/O DIFF Result Value Ref Range WBC 6.3 4.6 - 13.2 K/uL  
 RBC 4.99 4.70 - 5.50 M/uL  
 HGB 15.0 13.0 - 16.0 g/dL HCT 43.4 36.0 - 48.0 % MCV 87.0 74.0 - 97.0 FL  
 MCH 30.1 24.0 - 34.0 PG  
 MCHC 34.6 31.0 - 37.0 g/dL  
 RDW 14.4 11.6 - 14.5 % PLATELET 657 521 - 694 K/uL MPV 10.0 9.2 - 11.8 FL  
HEMOGLOBIN A1C W/O EAG Result Value Ref Range Hemoglobin A1c 6.0 (H) 4.2 - 5.6 % METABOLIC PANEL, COMPREHENSIVE Result Value Ref Range Sodium 139 136 - 145 mmol/L Potassium 4.3 3.5 - 5.5 mmol/L Chloride 104 100 - 108 mmol/L  
 CO2 29 21 - 32 mmol/L Anion gap 6 3.0 - 18 mmol/L Glucose 102 (H) 74 - 99 mg/dL BUN 15 7.0 - 18 MG/DL Creatinine 1.15 0.6 - 1.3 MG/DL  
 BUN/Creatinine ratio 13 12 - 20 GFR est AA >60 >60 ml/min/1.73m2 GFR est non-AA >60 >60 ml/min/1.73m2 Calcium 8.6 8.5 - 10.1 MG/DL Bilirubin, total 0.5 0.2 - 1.0 MG/DL  
 ALT (SGPT) 42 16 - 61 U/L  
 AST (SGOT) 32 15 - 37 U/L Alk. phosphatase 50 45 - 117 U/L Protein, total 7.0 6.4 - 8.2 g/dL Albumin 3.8 3.4 - 5.0 g/dL Globulin 3.2 2.0 - 4.0 g/dL A-G Ratio 1.2 0.8 - 1.7 VITAMIN B12 Result Value Ref Range Vitamin B12 456 211 - 911 pg/mL ASSESSMENT / PLAN 
  ICD-10-CM ICD-9-CM 1. Prediabetes R73.03 790.29   
2. Hypercholesterolemia E78.00 272.0 3. PAC (premature atrial contraction) I49.1 427.61   
4. RBBB I45.10 426.4 5. Aortic valve insufficiency, etiology of cardiac valve disease unspecified I35.1 424.1 6. Benign essential tremor G25.0 333.1 7. Abdominal wall hernia K43.9 553.20 REFERRAL TO SURGERY 8. Encounter for immunization Z23 V03.89 [place] IMMUNIZ ADMIN,1 SINGLE/COMB VAC/TOXOID INFLUENZA VIRUS VAC QUAD,SPLIT,PRESV FREE SYRINGE IM Reviewed labs. Prediabetes - A1c looks great. Continue healthy living habits. Hypercholesterolemia - Cont per cardiologist with Crestor 5mg nightly. PAC / RBBB/ aortic valve insufficiency - cont per cardiology. Notes reviewed. Aortic valve insufficiency-  Repeat ECHO per cardiology recommendations when due. Vit B12 def - recommended continued vit B12 1,000mcg SL daily. Benign essential tremor - cont per neurology. We can prescribe his Inderal if needed. I have advised that he runs this by cardiology as well to see if the RBBB is an issue with the Inderal. 
 
Abd wall hernia - refer to gen surg. Flu vaccine administered. Obesity - diet and exercise. Patient understands our medical plan. Patient has provided input and agrees with goals. Alternatives have been explained and offered. Risks/benefits and significant side effects of medications have been reviewed. Patient encouraged to review package inserts for any medications. All questions answered. The patient is to call if condition worsens or fails to improve. Follow-up Disposition: 
Return in about 6 months (around 3/19/2019) for routine care. A1c to be done in the office .

## 2018-09-19 NOTE — PROGRESS NOTES
Chief Complaint Patient presents with  
 Other  
  prediabetes  Cholesterol Problem  Vitamin B12 Deficiency  Obesity  Other PAC-followed by cardiology; f/u 3/22/19  Results  
  lab results review 1. Have you been to the ER, urgent care clinic since your last visit? Hospitalized since your last visit? No 
 
2. Have you seen or consulted any other health care providers outside of the [address] [place] [address] since your last visit? Include any pap smears or colon screening. No  
 
Physician order obtained. Patient completed adult immunization consent form. Allergies, contraindications and recommendations reviewed with patient. Seasonal influenza vaccine administered IM left deltoid. Patient tolerated well. Patient remained in office for 15 minutes after injection and no adverse reactions were noted.

## 2018-09-19 NOTE — MR AVS SNAPSHOT
[facility] [address] [facility] 
[phone] Patient: Niraj Perdomo MRN: [id number] AIK:8[birth year]16 Visit Information Date & Time Provider Department Dept. Phone Encounter #  
 9/19/2018  4:30 PM Alicia Loza, [address] [place] [address] [id number] Follow-up Instructions Return in about 6 months (around 3/19/2019) for routine care. A1c to be done in the office . Your Appointments 3/22/2019  4:00 PM  
Follow Up with Elena Orourke MD  
[facility] ([address]) Appt Note: 1 yr f/u with EKG  
 [address] [place] [address] [address] [address] [phone] [address] [place] [address] [address] Upcoming Health Maintenance Date Due ZOSTER VACCINE AGE 60> 11/10/2014 DTaP/Tdap/Td series (2 - Td) 6/29/2021 COLONOSCOPY 4/5/2027 Allergies as of 9/19/2018  Review Complete On: 9/19/2018 By: Alicia Loza MD  
  
 Severity Noted Reaction Type Reactions Simvastatin High 02/22/2016   Intolerance Myalgia Current Immunizations  Reviewed on 5/10/2018 Name Date Influenza Vaccine 9/8/2016, 8/19/2015, 9/17/2014 Influenza Vaccine (Quad) PF 9/19/2018, 10/18/2017 Influenza Vaccine Split 10/17/2012 TDAP Vaccine 6/29/2011 Not reviewed this visit You Were Diagnosed With   
  
 Codes Comments Prediabetes    -  Primary ICD-10-CM: R73.03 
ICD-9-CM: 790.29 Hypercholesterolemia     ICD-10-CM: E78.00 ICD-9-CM: 272.0 PAC (premature atrial contraction)     ICD-10-CM: I49.1 ICD-9-CM: 427.61   
 RBBB     ICD-10-CM: I45.10 ICD-9-CM: 426.4 Aortic valve insufficiency, etiology of cardiac valve disease unspecified     ICD-10-CM: I35.1 ICD-9-CM: 424.1 Benign essential tremor     ICD-10-CM: G25.0 ICD-9-CM: 333.1 Abdominal wall hernia     ICD-10-CM: K43.9 ICD-9-CM: 553.20 Encounter for immunization     ICD-10-CM: Z75 ICD-9-CM: V03.89 Vitals BP Pulse Temp Resp Height(growth percentile) Weight(growth percentile) 110/68 (BP 1 Location: Left arm, BP Patient Position: Sitting) 78 97.7 °F (36.5 °C) (Oral) 14 6' 2\" (1.88 m) 235 lb (106.6 kg) SpO2 BMI Smoking Status 98% 30.17 kg/m2 Never Smoker Vitals History BMI and BSA Data Body Mass Index Body Surface Area  
 30.17 kg/m 2 2.36 m 2 Preferred Pharmacy Pharmacy Name Phone Erick Lemus, [facility] [phone] Your Updated Medication List  
  
   
This list is accurate as of 9/19/18  5:02 PM.  Always use your most recent med list.  
  
  
  
  
 cyanocobalamin 1,000 mcg sublingual tablet Commonly known as:  VITAMIN B-12 Take 1 Tab by mouth daily. EFFEXOR  mg capsule Generic drug:  venlafaxine-SR Take 150 mg by mouth daily. Indications: ANXIETY WITH DEPRESSION JUBLIA Oralia topical solution Generic drug:  efinaconazole  
  
 naproxen 250 mg tablet Commonly known as:  NAPROSYN Take 250 mg by mouth every eight (8) hours as needed. propranolol LA 80 mg SR capsule Commonly known as:  INDERAL LA Take 1 Cap by mouth daily. rosuvastatin 5 mg tablet Commonly known as:  CRESTOR  
TAKE 1 TABLET NIGHTLY We Performed the Following INFLUENZA VIRUS VAC QUAD,SPLIT,PRESV FREE SYRINGE IM [id number] CPT(R)] [place] IMMUNIZ ADMIN,1 SINGLE/COMB VAC/TOXOID [id number] CPT(R)] REFERRAL TO SURGERY [IMA867 Custom] Follow-up Instructions Return in about 6 months (around 3/19/2019) for routine care. A1c to be done in the office . Referral Information Referral ID Referred By Referred To  
  
 [id number] Yesenia Brito MD   
   [address] [facility]   
   [address] [facility] [place], [address] Phone: [phone] Fax: [phone] Visits Status Start Date End Date 1 New Request 9/19/18 9/19/19 If your referral has a status of pending review or denied, additional information will be sent to support the outcome of this decision. Patient Instructions A Healthy Lifestyle: Care Instructions Your Care Instructions A healthy lifestyle can help you feel good, stay at a healthy weight, and have plenty of energy for both work and play. A healthy lifestyle is something you can share with your whole family. A healthy lifestyle also can lower your risk for serious health problems, such as high blood pressure, heart disease, and diabetes. You can follow a few steps listed below to improve your health and the health of your family. Follow-up care is a key part of your treatment and safety. Be sure to make and go to all appointments, and call your doctor if you are having problems. It's also a good idea to know your test results and keep a list of the medicines you take. How can you care for yourself at home? · Do not eat too much sugar, fat, or fast foods. You can still have dessert and treats now and then. The goal is moderation. · Start small to improve your eating habits. Pay attention to portion sizes, drink less juice and soda pop, and eat more fruits and vegetables. ¨ Eat a healthy amount of food. A 3-ounce serving of meat, for example, is about the size of a deck of cards. Fill the rest of your plate with vegetables and whole grains. ¨ Limit the amount of soda and sports drinks you have every day. Drink more water when you are thirsty. ¨ Eat at least 5 servings of fruits and vegetables every day. It may seem like a lot, but it is not hard to reach this goal. A serving or helping is 1 piece of fruit, 1 cup of vegetables, or 2 cups of leafy, raw vegetables. Have an apple or some carrot sticks as an afternoon snack instead of a candy bar.  Try to have fruits and/or vegetables at every meal. 
 · Make exercise part of your daily routine. You may want to start with simple activities, such as walking, bicycling, or slow swimming. Try to be active 30 to 60 minutes every day. You do not need to do all 30 to 60 minutes all at once. For example, you can exercise 3 times a day for 10 or 20 minutes. Moderate exercise is safe for most people, but it is always a good idea to talk to your doctor before starting an exercise program. 
· Keep moving. Ramón Marko the lawn, work in the garden, or [employer]. Take the stairs instead of the elevator at work. · If you smoke, quit. People who smoke have an increased risk for heart attack, stroke, cancer, and other lung illnesses. Quitting is hard, but there are ways to boost your chance of quitting tobacco for good. ¨ Use nicotine gum, patches, or lozenges. ¨ Ask your doctor about stop-smoking programs and medicines. ¨ Keep trying. In addition to reducing your risk of diseases in the future, you will notice some benefits soon after you stop using tobacco. If you have shortness of breath or asthma symptoms, they will likely get better within a few weeks after you quit. · Limit how much alcohol you drink. Moderate amounts of alcohol (up to 2 drinks a day for men, 1 drink a day for women) are okay. But drinking too much can lead to liver problems, high blood pressure, and other health problems. Family health If you have a family, there are many things you can do together to improve your health. · Eat meals together as a family as often as possible. · Eat healthy foods. This includes fruits, vegetables, lean meats and dairy, and whole grains. · Include your family in your fitness plan. Most people think of activities such as jogging or tennis as the way to fitness, but there are many ways you and your family can be more active. Anything that makes you breathe hard and gets your heart pumping is exercise. Here are some tips: ¨ Walk to do errands or to take your child to school or the bus. ¨ Go for a family bike ride after dinner instead of watching TV. Where can you learn more? Go to http://charo-primo.info/. Enter F980 in the search box to learn more about \"A Healthy Lifestyle: Care Instructions. \" Current as of: December 7, 2017 Content Version: 11.7 © 9900-5238 Turning[employer]. Care instructions adapted under license by [employer] (which disclaims liability or warranty for this information). If you have questions about a medical condition or this instruction, always ask your healthcare professional. [address] any warranty or liability for your use of this information. Introducing [facility] & HEALTH SERVICES! Dear Khadra Almonte: Thank you for requesting a [employer] account. Our records indicate that you already have an active [employer] account. You can access your account anytime at [email or web address]. [employer] Did you know that you can access your hospital and ER discharge instructions at any time in [employer]? You can also review all of your test results from your hospital stay or ER visit. Additional Information If you have questions, please visit the Frequently Asked Questions section of the [employer] website at [email or web address]. [employer]/. Remember, [employer] is NOT to be used for urgent needs. For medical emergencies, dial 911. Now available from your iPhone and Android! Please provide this summary of care documentation to your next provider. Your primary care clinician is listed as Antonio Wolf. If you have any questions after today's visit, please call [phone].

## 2018-09-19 NOTE — PATIENT INSTRUCTIONS

## 2018-10-05 ENCOUNTER — OFFICE VISIT (OUTPATIENT)
Dept: SURGERY | Age: 63
End: 2018-10-05

## 2018-10-05 VITALS
DIASTOLIC BLOOD PRESSURE: 84 MMHG | WEIGHT: 235 LBS | RESPIRATION RATE: 16 BRPM | BODY MASS INDEX: 30.16 KG/M2 | SYSTOLIC BLOOD PRESSURE: 130 MMHG | HEIGHT: 74 IN

## 2018-10-05 DIAGNOSIS — K43.9 VENTRAL HERNIA WITHOUT OBSTRUCTION OR GANGRENE: Primary | ICD-10-CM

## 2018-10-05 NOTE — MR AVS SNAPSHOT
[address] [facility] [address] 
 
 
 [address] [facility] [address] [facility] [address] 
[phone] Patient: Vilma Sebastian MRN: [id number] ANGELINE:[birth year] Visit Information Date & Time Provider Department Dept. Phone Encounter #  
 10/5/2018 10:30 AM Walker Mortensen MD [facility] [phone] [id number] Your Appointments 10/12/2018  3:00 PM  
Follow Up with Walker Mortensen MD  
[address]) Appt Note: Follow up to [facility] [address] [facility] [address]  
[phone]  
  
   
 [address] [address]  
  
    
 10/19/2018 10:00 AM  
Follow Up with Walker Mortensen MD  
[address]) Appt Note: f/up to CT  
 [address] [facility] [address] [facility] [address]  
[phone]  
  
    
 3/19/2019  9:45 AM  
FOLLOW UP EXAM with Basilio Wren MD  
[facility] ([address] [facility]) Appt Note: routine f/u 6mo [address] [facility] [address] [place] [address] [address] [facility] [address]  
  
    
 3/22/2019  4:00 PM  
Follow Up with Sheyla Orozco MD  
[facility] ([address] [facility]) Appt Note: 1 yr f/u with EKG  
 [address] [place] [address] [address]  
[phone] [address] [place] [address] [address] Upcoming Health Maintenance Date Due Shingrix Vaccine Age 50> (1 of 2) 1/10/2005 DTaP/Tdap/Td series (2 - Td) 6/29/2021 COLONOSCOPY 4/5/2027 Allergies as of 10/5/2018  Review Complete On: 10/5/2018 By: Walker Mortensen MD  
  
 Severity Noted Reaction Type Reactions Simvastatin High 02/22/2016   Intolerance Myalgia Current Immunizations  Reviewed on 5/10/2018 Name Date Influenza Vaccine 9/8/2016, 8/19/2015, 9/17/2014 Influenza Vaccine (Quad) PF 9/19/2018, 10/18/2017 Influenza Vaccine Split 10/17/2012 TDAP Vaccine 6/29/2011 Not reviewed this visit You Were Diagnosed With   
  
 Codes Comments Ventral hernia without obstruction or gangrene    -  Primary ICD-10-CM: K43.9 ICD-9-CM: 553.20 Vitals BP Resp Height(growth percentile) Weight(growth percentile) BMI Smoking Status 130/84 16 6' 2\" (1.88 m) 235 lb (106.6 kg) 30.17 kg/m2 Never Smoker Vitals History BMI and BSA Data Body Mass Index Body Surface Area  
 30.17 kg/m 2 2.36 m 2 Preferred Pharmacy Pharmacy Name Phone Erick Lemus, [facility] [phone] Your Updated Medication List  
  
   
This list is accurate as of 10/5/18 11:38 AM.  Always use your most recent med list.  
  
  
  
  
 cyanocobalamin 1,000 mcg sublingual tablet Commonly known as:  VITAMIN B-12 Take 1 Tab by mouth daily. EFFEXOR  mg capsule Generic drug:  venlafaxine-SR Take 150 mg by mouth daily. Indications: ANXIETY WITH DEPRESSION JUBLIA Oralia topical solution Generic drug:  efinaconazole  
  
 naproxen 250 mg tablet Commonly known as:  NAPROSYN Take 250 mg by mouth every eight (8) hours as needed. propranolol LA 80 mg SR capsule Commonly known as:  INDERAL LA Take 1 Cap by mouth daily. rosuvastatin 5 mg tablet Commonly known as:  CRESTOR  
TAKE 1 TABLET NIGHTLY To-Do List   
 10/05/2018 Imaging:  CT ABD PELV W CONT Introducing [facility] & HEALTH SERVICES! Dear Renold Romberg: Thank you for requesting a [employer] account. Our records indicate that you already have an active [employer] account. You can access your account anytime at [email or web address] by [email or web address]. [email or web address] by Turning [employer] Did you know that you can access your hospital and ER discharge instructions at any time in [employer]? You can also review all of your test results from your hospital stay or ER visit. Additional Information If you have questions, please visit the Frequently Asked Questions section of the [employer] website at [email or web address] Remember, [employer] is NOT to be used for urgent needs. For medical emergencies, dial 911. Now available from your iPhone and Android! Please provide this summary of care documentation to your next provider. Your primary care clinician is listed as Sd Bookbinder. If you have any questions after today's visit, please call [phone].

## 2018-10-05 NOTE — PROGRESS NOTES
Progress Note    Patient: Blaise Medina  MRN: [id number]  SSN: [id number]   YOB: 1955  Age: 61 y.o. Sex: male     Chief Complaint   Patient presents with    Hernia (Non Specific)       HPI    Mr. Vaishnavi aPtten is a 59-year-old gentleman with a small umbilical hernia but is thought to have a supraumbilical hernia that is mildly symptomatic from time to time. He does have a diastases recti but this is something different. He has had several episodes of pain in the area. He is never had other constitutional complaints but is interested in a repair should he need one. Past Medical History:   Diagnosis Date    Anxiety     Cardiac echocardiogram 03/04/2016    EF 55-60%. No RWMA. Gr 1 DDfx. Mild AI. No significant valvular heart disease.  Cardiac exercise stress test 08/01/2014    Neg EKG on maximal treadmill stress test.  One 6-beat episode of NSVT. Ex time 6 min.  Cardiovascular aorto-iliac duplex 03/12/2015    No AAA. Patent bilateral CIAs. Patent renal arteries.     Hearing loss     Hemorrhoids     Hypercholesterolemia     has not tolerated some statins in the past    Inguinal hernia, right     Lumbar herniated disc     L4-5, Dr. Irma Flores PAC (premature atrial contraction)     Prediabetes     S/P colonoscopy 1/29/07    Dr. Risa Lau, no polyps    S/P colonoscopy 04/05/2017    Moderate diverticulosis of the sigmoid colon / repeat in 10 years per report     Past Surgical History:   Procedure Laterality Date    HX BACK SURGERY  7/21/2011    Dr. Chuhco Barton  11/12    right inguinal    HX KNEE REPLACEMENT Right 09/07/2016    HX SHOULDER ARTHROSCOPY Left 07/20/2015    Dr. Lucas Boland / Rotator Cuff Repair    HX TONSILLECTOMY  15yo     Allergies   Allergen Reactions    Simvastatin Myalgia     Current Outpatient Prescriptions   Medication Sig Dispense Refill    rosuvastatin (CRESTOR) 5 mg tablet TAKE 1 TABLET NIGHTLY 90 Tab 3    propranolol LA (INDERAL LA) 80 mg SR capsule Take 1 Cap by mouth daily.  cyanocobalamin (VITAMIN B-12) 1,000 mcg sublingual tablet Take 1 Tab by mouth daily. 90 Tab 3    venlafaxine-SR (EFFEXOR XR) 150 mg capsule Take 150 mg by mouth daily. Indications: ANXIETY WITH DEPRESSION      naproxen (NAPROSYN) 250 mg tablet Take 250 mg by mouth every eight (8) hours as needed.  JUBLIA connie topical solution        Social History     Social History    Marital status:      Spouse name: N/A    Number of children: N/A    Years of education: N/A     Occupational History          Social History Main Topics    Smoking status: Never Smoker    Smokeless tobacco: Never Used    Alcohol use 0.0 oz/week     0 Cans of beer per week    Drug use: No    Sexual activity: Yes     Partners: Female     Other Topics Concern    Not on file     Social History Narrative     Family History   Problem Relation Age of Onset    Alcohol abuse Father     Cancer Father 79     lung    Coronary Artery Disease Father      46s    Emphysema Father     Heart Failure Father     Other Father      AAA    Heart Attack Maternal Grandmother     Other Brother      AAA    Coronary Artery Disease Brother 77         Review of systems:  Patient denies any reflux, emesis, abdominal pain, change in bowel habits, hematochezia, melena, fever, weight loss, fatigue chills, dermatitis, abnormal moles, change in vision, vertigo, epistaxis, dysphagia, hoarseness, chest pain, palpitations, hypertension, edema, cough, shortness of breath, wheezing, hemoptysis, snoring, hematuria, diabetes, thyroid disease, anemia, bruising, history of blood transfusion, dizziness, headache, or fainting.     Physical Examination    Well developed well nourished male in no apparent distress  Visit Vitals    /84    Resp 16    Ht 6' 2\" (1.88 m)    Wt 106.6 kg (235 lb)    BMI 30.17 kg/m2      Head: normocephalic, atraumatic  Mouth: Clear, no overt lesions, oral mucosa pink and moist  Neck: supple, no masses, no adenopathy or carotid bruits, trachea midline  Resp: clear to auscultation bilaterally, no wheeze, rhonchi or rales, excursions normal and symmetrical  Cardio: Regular rate and rhythm, no murmurs, clicks, gallops or rubs, no edema or varicosities  Abdomen: soft, nontender, nondistended, normoactive bowel sounds, small umbilical hernia, no hepatosplenomegaly,   Back: Deferred  Extremeties: warm, well-perfused, no tenderness or swelling, normal gait/station  Neuro: sensation and strength grossly intact and symmetrical  Psych: alert and oriented to person, place and time  Breast exam deferred    IMPRESSION  Possible ventral hernia with symptoms    PLAN  No orders of the defined types were placed in this encounter.     CT abdomen to understand wall anatomy  Padma Simeon MD

## 2018-10-16 ENCOUNTER — HOSPITAL ENCOUNTER (OUTPATIENT)
Dept: CT IMAGING | Age: 63
Discharge: HOME | End: 2018-10-16
Payer: COMMERCIAL

## 2018-10-16 DIAGNOSIS — K43.9 VENTRAL HERNIA WITHOUT OBSTRUCTION OR GANGRENE: ICD-10-CM

## 2018-10-16 LAB — CREAT UR-MCNC: 1 MG/DL (ref 0.6–1.3)

## 2018-10-16 PROCEDURE — 74011636320 HC RX REV CODE- 636/320

## 2018-10-16 PROCEDURE — 74177 CT ABD & PELVIS W/CONTRAST: CPT

## 2018-10-16 PROCEDURE — 82565 ASSAY OF CREATININE: CPT

## 2018-10-16 RX ADMIN — IOPAMIDOL 100 ML: 612 INJECTION, SOLUTION INTRAVENOUS at 08:03

## 2018-10-19 ENCOUNTER — OFFICE VISIT (OUTPATIENT)
Dept: SURGERY | Age: 63
End: 2018-10-19

## 2018-10-19 VITALS
SYSTOLIC BLOOD PRESSURE: 106 MMHG | HEIGHT: 74 IN | DIASTOLIC BLOOD PRESSURE: 72 MMHG | BODY MASS INDEX: 30.16 KG/M2 | RESPIRATION RATE: 16 BRPM | WEIGHT: 235 LBS

## 2018-10-19 DIAGNOSIS — K43.9 VENTRAL HERNIA WITHOUT OBSTRUCTION OR GANGRENE: Primary | ICD-10-CM

## 2018-10-19 NOTE — PROGRESS NOTES
Progress Note    Patient: Brendan Gordon  MRN: [id number]  SSN: [id number]   YOB: 1955  Age: 61 y.o. Sex: male     Chief Complaint   Patient presents with    Follow-up     ct results       HPI    Marietta Goodell is a 42-year-old gentleman who has a supraumbilical ventral hernia that is small and essentially asymptomatic and not bothersome. He would not like it fixed at present. We did scan through his abdomen to look at this hernia and I have reviewed those images with him. He will contact us should his symptoms increase. Past Medical History:   Diagnosis Date    Anxiety     Cardiac echocardiogram 03/04/2016    EF 55-60%. No RWMA. Gr 1 DDfx. Mild AI. No significant valvular heart disease.  Cardiac exercise stress test 08/01/2014    Neg EKG on maximal treadmill stress test.  One 6-beat episode of NSVT. Ex time 6 min.  Cardiovascular aorto-iliac duplex 03/12/2015    No AAA. Patent bilateral CIAs. Patent renal arteries.  Hearing loss     Hemorrhoids     Hypercholesterolemia     has not tolerated some statins in the past    Inguinal hernia, right     Lumbar herniated disc     L4-5, Dr. Jauregui [facility] PAC (premature atrial contraction)     Prediabetes     S/P colonoscopy 1/29/07    Dr. Carlota Kerns, no polyps    S/P colonoscopy 04/05/2017    Moderate diverticulosis of the sigmoid colon / repeat in 10 years per report     Past Surgical History:   Procedure Laterality Date    HX BACK SURGERY  7/21/2011    Dr. Lorene Duckworth  11/12    right inguinal    HX KNEE REPLACEMENT Right 09/07/2016    HX SHOULDER ARTHROSCOPY Left 07/20/2015    Dr. Sundeep Hinojosa / Rotator Cuff Repair    HX TONSILLECTOMY  17yo     Allergies   Allergen Reactions    Simvastatin Myalgia     Current Outpatient Medications   Medication Sig Dispense Refill    rosuvastatin (CRESTOR) 5 mg tablet TAKE 1 TABLET NIGHTLY 90 Tab 3    propranolol LA (INDERAL LA) 80 mg SR capsule Take 1 Cap by mouth daily.  cyanocobalamin (VITAMIN B-12) 1,000 mcg sublingual tablet Take 1 Tab by mouth daily. 90 Tab 3    naproxen (NAPROSYN) 250 mg tablet Take 250 mg by mouth every eight (8) hours as needed.  JUBLIA connie topical solution       venlafaxine-SR (EFFEXOR XR) 150 mg capsule Take 150 mg by mouth daily. Indications: ANXIETY WITH DEPRESSION       Social History     Socioeconomic History    Marital status:      Spouse name: Not on file    Number of children: Not on file    Years of education: Not on file    Highest education level: Not on file   Social Needs    Financial resource strain: Not on file    Food insecurity - worry: Not on file    Food insecurity - inability: Not on file    Transportation needs - medical: Not on file   [employer] needs - non-medical: Not on file   Occupational History    Occupation:    Tobacco Use    Smoking status: Never Smoker    Smokeless tobacco: Never Used   Substance and Sexual Activity    Alcohol use:  Yes     Alcohol/week: 0.0 oz    Drug use: No    Sexual activity: Yes     Partners: Female   Other Topics Concern    Not on file   Social History Narrative    Not on file     Family History   Problem Relation Age of Onset    Alcohol abuse Father     Cancer Father 79        lung    Coronary Artery Disease Father         46s    Emphysema Father     Heart Failure Father     Other Father         AAA    Heart Attack Maternal Grandmother     Other Brother         AAA    Coronary Artery Disease Brother 77         Review of systems:  Patient denies any reflux, emesis, abdominal pain, change in bowel habits, hematochezia, melena, fever, weight loss, fatigue chills, dermatitis, abnormal moles, change in vision, vertigo, epistaxis, dysphagia, hoarseness, chest pain, palpitations, hypertension, edema, cough, shortness of breath, wheezing, hemoptysis, snoring, hematuria, diabetes, thyroid disease, anemia, bruising, history of blood transfusion, dizziness, headache, or fainting. Physical Examination    Well developed well nourished male in no apparent distress  Visit Vitals  /72   Resp 16   Ht 6' 2\" (1.88 m)   Wt 106.6 kg (235 lb)   BMI 30.17 kg/m²      Head: normocephalic, atraumatic  Mouth: Clear, no overt lesions, oral mucosa pink and moist  Neck: supple, no masses, no adenopathy or carotid bruits, trachea midline  Resp: clear to auscultation bilaterally, no wheeze, rhonchi or rales, excursions normal and symmetrical  Cardio: Regular rate and rhythm, no murmurs, clicks, gallops or rubs, no edema or varicosities  Abdomen: soft, nontender, nondistended, normoactive bowel sounds, small umbilical and small supraumbilical ventral hernia, no hepatosplenomegaly,   Back: Deferred  Extremeties: warm, well-perfused, no tenderness or swelling, normal gait/station  Neuro: sensation and strength grossly intact and symmetrical  Psych: alert and oriented to person, place and time  Breast exam deferred    IMPRESSION  Ventral hernias as above, asymptomatic    PLAN  No orders of the defined types were placed in this encounter.     Follow-up as needed  Aki Rodriguez MD

## 2019-03-19 ENCOUNTER — OFFICE VISIT (OUTPATIENT)
Dept: FAMILY MEDICINE CLINIC | Age: 64
End: 2019-03-19

## 2019-03-19 VITALS
WEIGHT: 240 LBS | HEART RATE: 62 BPM | RESPIRATION RATE: 14 BRPM | SYSTOLIC BLOOD PRESSURE: 128 MMHG | TEMPERATURE: 98.2 F | OXYGEN SATURATION: 96 % | DIASTOLIC BLOOD PRESSURE: 82 MMHG | BODY MASS INDEX: 30.8 KG/M2 | HEIGHT: 74 IN

## 2019-03-19 DIAGNOSIS — E53.8 VITAMIN B12 DEFICIENCY: ICD-10-CM

## 2019-03-19 DIAGNOSIS — Z12.5 PROSTATE CANCER SCREENING: ICD-10-CM

## 2019-03-19 DIAGNOSIS — E78.00 HYPERCHOLESTEROLEMIA: ICD-10-CM

## 2019-03-19 DIAGNOSIS — K43.9 ABDOMINAL WALL HERNIA: ICD-10-CM

## 2019-03-19 DIAGNOSIS — E66.9 OBESITY, UNSPECIFIED CLASSIFICATION, UNSPECIFIED OBESITY TYPE, UNSPECIFIED WHETHER SERIOUS COMORBIDITY PRESENT: ICD-10-CM

## 2019-03-19 DIAGNOSIS — I49.1 PAC (PREMATURE ATRIAL CONTRACTION): ICD-10-CM

## 2019-03-19 DIAGNOSIS — G25.0 BENIGN ESSENTIAL TREMOR: ICD-10-CM

## 2019-03-19 DIAGNOSIS — M62.08 DIASTASIS RECTI: ICD-10-CM

## 2019-03-19 DIAGNOSIS — R73.03 PREDIABETES: Primary | ICD-10-CM

## 2019-03-19 DIAGNOSIS — I35.1 AORTIC VALVE INSUFFICIENCY, ETIOLOGY OF CARDIAC VALVE DISEASE UNSPECIFIED: ICD-10-CM

## 2019-03-19 DIAGNOSIS — I45.10 RBBB: ICD-10-CM

## 2019-03-19 LAB — HBA1C MFR BLD HPLC: 5.9 %

## 2019-03-19 RX ORDER — MAGNESIUM 200 MG
1000 TABLET ORAL DAILY
Qty: 90 TAB | Refills: 3 | Status: SHIPPED | OUTPATIENT
Start: 2019-03-19

## 2019-03-19 NOTE — PATIENT INSTRUCTIONS
Erectile Dysfunction: Care Instructions Your Care Instructions A man has erectile dysfunction (ED) when he routinely can't get or keep an erection that allows satisfactory sex. He may not be able to have an erection at any time. Or he may not be able to have one that is firm enough or lasts long enough to complete intercourse. ED is not the same as having trouble getting an erection now and then. That's common. It happens to most men at some time. ED can be caused by problems with the blood vessels, nerves, or hormones. It can be caused by diabetes, heart disease, and injuries. Nerve disorders, such as multiple sclerosis or Parkinson's disease, can also cause it. ED can also be caused by medicines, alcohol, and tobacco. Or it may be caused by depression, stress, grief, or relationship problems. Follow-up care is a key part of your treatment and safety. Be sure to make and go to all appointments, and call your doctor if you are having problems. It's also a good idea to know your test results and keep a list of the medicines you take. How can you care for yourself at home? 
 Lifestyle 
  · Limit alcohol. Have no more than 2 drinks a day.  
  · Do not smoke. Smoking makes it harder for the blood vessels in the penis to relax and let blood flow in. If you need help quitting, talk to your doctor about stop-smoking programs and medicines. These can increase your chances of quitting for good.  
  · Do not use cocaine, heroin, or other illegal drugs.  
  · Try to reduce stress.  
  · Give yourself time to adjust to change. Changes in your job, family, relationships, home life, and other areas can cause stress. And stress can cause erection problems.  
 Work with your partner 
  · Don't assume that you know what your partner likes when it comes to sex. You may be wrong. Talk about what each of you does and does not enjoy.  
  · Make time outside of the bedroom to talk about your sex life.  If you avoid sex because you are afraid of having erection problems, your partner may worry that you are no longer interested.  
  · If you and your partner have trouble talking about sex, see a therapist who can help you talk about it. Reading books with your partner about sexual health may also help.  
  · Relax. Take time for more foreplay. Worrying about your erections may only make things worse. Medicines 
  · Tell your doctor about all the medicines that you take. ? Some medicines can cause erection problems. ? Some medicines can have dangerous interactions with medicines that are prescribed for ED, including over-the-counter medicines and herbal products.  
  · Be safe with medicines. Take your medicines exactly as prescribed. Call your doctor if you think you are having a problem with your medicine.  
  · Talk to your doctor about trying a medicine to help you keep an erection. This could be a medicine such as Viagra, Levitra, or Cialis. If you have a heart problem, ask your doctor if these are safe for you. Do not take these medicines if you take nitroglycerin or other nitrate medicine. When should you call for help? Call your doctor now or seek immediate medical care if: 
  · You took a medicine for erectile dysfunction and you have an erection that lasts longer than 3 hours.  
 Watch closely for changes in your health, and be sure to contact your doctor if you have any problems. Where can you learn more? Go to http://charo-primo.info/. Enter 052 [id number] [id number] in the search box to learn more about \"Erectile Dysfunction: Care Instructions. \" Current as of: September 26, 2018 Content Version: 11.9 © 4306-2170 [employer]. Care instructions adapted under license by [employer] (which disclaims liability or warranty for this information).  If you have questions about a medical condition or this instruction, always ask your healthcare professional. [employer] disclaims any warranty or liability for your use of this information. A Healthy Lifestyle: Care Instructions Your Care Instructions A healthy lifestyle can help you feel good, stay at a healthy weight, and have plenty of energy for both work and play. A healthy lifestyle is something you can share with your whole family. A healthy lifestyle also can lower your risk for serious health problems, such as high blood pressure, heart disease, and diabetes. You can follow a few steps listed below to improve your health and the health of your family. Follow-up care is a key part of your treatment and safety. Be sure to make and go to all appointments, and call your doctor if you are having problems. It's also a good idea to know your test results and keep a list of the medicines you take. How can you care for yourself at home? · Do not eat too much sugar, fat, or fast foods. You can still have dessert and treats now and then. The goal is moderation. · Start small to improve your eating habits. Pay attention to portion sizes, drink less juice and soda pop, and eat more fruits and vegetables. ? Eat a healthy amount of food. A 3-ounce serving of meat, for example, is about the size of a deck of cards. Fill the rest of your plate with vegetables and whole grains. ? Limit the amount of soda and sports drinks you have every day. Drink more water when you are thirsty. ? Eat at least 5 servings of fruits and vegetables every day. It may seem like a lot, but it is not hard to reach this goal. A serving or helping is 1 piece of fruit, 1 cup of vegetables, or 2 cups of leafy, raw vegetables. Have an apple or some carrot sticks as an afternoon snack instead of a candy bar. Try to have fruits and/or vegetables at every meal. 
· Make exercise part of your daily routine. You may want to start with simple activities, such as walking, bicycling, or slow swimming.  Try to be active 30 to 60 minutes every day. You do not need to do all 30 to 60 minutes all at once. For example, you can exercise 3 times a day for 10 or 20 minutes. Moderate exercise is safe for most people, but it is always a good idea to talk to your doctor before starting an exercise program. 
· Keep moving. Haroon Drake the lawn, work in the garden, or [employer]. Take the stairs instead of the elevator at work. · If you smoke, quit. People who smoke have an increased risk for heart attack, stroke, cancer, and other lung illnesses. Quitting is hard, but there are ways to boost your chance of quitting tobacco for good. ? Use nicotine gum, patches, or lozenges. ? Ask your doctor about stop-smoking programs and medicines. ? Keep trying. In addition to reducing your risk of diseases in the future, you will notice some benefits soon after you stop using tobacco. If you have shortness of breath or asthma symptoms, they will likely get better within a few weeks after you quit. · Limit how much alcohol you drink. Moderate amounts of alcohol (up to 2 drinks a day for men, 1 drink a day for women) are okay. But drinking too much can lead to liver problems, high blood pressure, and other health problems. Family health If you have a family, there are many things you can do together to improve your health. · Eat meals together as a family as often as possible. · Eat healthy foods. This includes fruits, vegetables, lean meats and dairy, and whole grains. · Include your family in your fitness plan. Most people think of activities such as jogging or tennis as the way to fitness, but there are many ways you and your family can be more active. Anything that makes you breathe hard and gets your heart pumping is exercise. Here are some tips: 
? Walk to do errands or to take your child to school or the bus. 
? Go for a family bike ride after dinner instead of watching TV. Where can you learn more? Go to http://charo-primo.info/. Enter E634 in the search box to learn more about \"A Healthy Lifestyle: Care Instructions. \" Current as of: September 11, 2018 Content Version: 11.9 © 4292-0680 [employer]. Care instructions adapted under license by [employer] (which disclaims liability or warranty for this information). If you have questions about a medical condition or this instruction, always ask your healthcare professional. [address] any warranty or liability for your use of this information. Follow up in 6 months for routine care and please have 10 hour fasting labs done 3-7 days prior

## 2019-03-19 NOTE — PROGRESS NOTES
SUBJECTIVE Chief Complaint Patient presents with  
 Other  
  prediabetes  Cholesterol Problem Patient presents for follow-up on their prediabetes and hypercholesterolemia. He has since retired. Doing well and keeping busy. He is seeing cardiology for management of cardiac risk factor modification. He is on a low dose Crestor 5mg daily. He has an appointment Friday. He has tolerated propranolol for a tremor. He finds it very helpful. He had labs showing a vit B deficiency and was taking vit B12. He says he ran out and has not restarted. His b12 levels did normalize. ROS: 
History obtained from the patient · Respiratory: no cough, shortness of breath, or wheezing · Cardiovascular: no chest pain, palpitations, or dyspnea on exertion · Gastrointestinal: no abdominal pain, N/V. Has had some pain with his abd wall hernia at times. Saw gen surg. · : ED - noticed intermittent difficulties attaining erections. Wanted to bring up. Maybe since halfway. Does have morning time erections and at times has no difficulties. OBJECTIVE Blood pressure 128/82, pulse 62, temperature 98.2 °F (36.8 °C), temperature source Oral, resp. rate 14, height 6' 2\" (1.88 m), weight 240 lb (108.9 kg), SpO2 96 %. General:  alert, cooperative, well appearing, in no apparent distress. CV:  The heart sounds are regular in rate and rhythm. There is a normal S1 and S2. There or no murmurs. Lungs: Inspiratory and expiratory efforts are full and unlabored. Lung sounds are clear and equal to auscultation throughout all lung fields without wheezing, rales, or rhonchi. Abd: just above his umbilicus there is a midline reducible hernia palpated. It is nontender at the moment. He also has a diastasis recti. Psych: normal affect. Mood good. Oriented x 3. Judgement and insight intact. Results for orders placed or performed in visit on 03/19/19 AMB POC HEMOGLOBIN A1C  
 Result Value Ref Range Hemoglobin A1c (POC) 5.9 % ASSESSMENT / PLAN 
  ICD-10-CM ICD-9-CM 1. Prediabetes R73.03 790.29 AMB POC HEMOGLOBIN A1C HEMOGLOBIN A1C W/O EAG 2. Hypercholesterolemia E78.00 272.0 CBC W/O DIFF  
   METABOLIC PANEL, COMPREHENSIVE  
   LIPID PANEL 3. PAC (premature atrial contraction) I49.1 427.61   
4. RBBB I45.10 426.4 5. Aortic valve insufficiency, etiology of cardiac valve disease unspecified I35.1 424.1 6. Benign essential tremor G25.0 333.1 7. Abdominal wall hernia K43.9 553.20 8. Prostate cancer screening Z12.5 V76.44 PSA SCREENING (SCREENING) 9. Vitamin B12 deficiency E53.8 266.2 VITAMIN B12  
10. Diastasis recti M62.08 728.84   
11. Obesity, unspecified classification, unspecified obesity type, unspecified whether serious comorbidity present E66.9 278.00 Reviewed labs. Prediabetes - A1c looks great. Continue healthy living habits. Hypercholesterolemia - Cont per cardiologist with Crestor 5mg nightly. PAC / RBBB/ aortic valve insufficiency - cont per cardiology. Notes reviewed. Aortic valve insufficiency-  Repeat ECHO per cardiology recommendations when due. Vit B12 def - recommended continued vit B12 1,000mcg SL daily. Benign essential tremor - cont per neurology. We can prescribe his Inderal if needed. I have advised that he runs this by cardiology as well to see if the RBBB is an issue with the Inderal. 
 
Ventral hernia / diastasis recti - reviewed gen surg notes, recommended no interventions at this time. Obesity - diet and exercise. All chart history elements were reviewed by me at the time of the visit even though marked at time of note closure. Patient understands our medical plan. Patient has provided input and agrees with goals. Alternatives have been explained and offered. All questions answered. The patient is to call if condition worsens or fails to improve. Follow-up Disposition: Return in about 6 months (around 9/19/2019) for routine care and please have 10 hour fasting labs done 3-7 days prior.

## 2019-03-19 NOTE — PROGRESS NOTES
1. Have you been to the ER, urgent care clinic since your last visit? Hospitalized since your last visit? No 
 
2. Have you seen or consulted any other health care providers outside of the [address] [place] [address] since your last visit? Include any pap smears or colon screening.  No

## 2019-03-21 ENCOUNTER — PATIENT OUTREACH (OUTPATIENT)
Dept: FAMILY MEDICINE CLINIC | Age: 64
End: 2019-03-21

## 2019-03-21 NOTE — PROGRESS NOTES
Call placed to patient at PCP request.  Patient has questions about upcoming medicare enrollment. NN referred him to [employer] [facility] [employer] and their [employer]

## 2019-03-22 ENCOUNTER — OFFICE VISIT (OUTPATIENT)
Dept: CARDIOLOGY CLINIC | Age: 64
End: 2019-03-22

## 2019-03-22 VITALS
HEART RATE: 79 BPM | OXYGEN SATURATION: 97 % | DIASTOLIC BLOOD PRESSURE: 94 MMHG | WEIGHT: 242 LBS | HEIGHT: 74 IN | SYSTOLIC BLOOD PRESSURE: 130 MMHG | BODY MASS INDEX: 31.06 KG/M2

## 2019-03-22 DIAGNOSIS — I45.10 RBBB: Primary | ICD-10-CM

## 2019-03-22 DIAGNOSIS — E78.00 HYPERCHOLESTEROLEMIA: ICD-10-CM

## 2019-03-22 NOTE — PROGRESS NOTES
Krypton Jamarie presents today for   Chief Complaint   Patient presents with    Follow-up     1 year        Anicetous Perez preferred language for health care discussion is english/other. Is someone accompanying this pt? No     Is the patient using any DME equipment during OV? No     Depression Screening:  3 most recent PHQ Screens 3/19/2019   Little interest or pleasure in doing things Not at all   Feeling down, depressed, irritable, or hopeless Not at all   Total Score PHQ 2 0       Learning Assessment:  Learning Assessment 5/10/2018   PRIMARY LEARNER Patient   HIGHEST LEVEL OF EDUCATION - PRIMARY LEARNER  SOME COLLEGE   BARRIERS PRIMARY LEARNER NONE     NONE   CO-LEARNER CAREGIVER -   PRIMARY LANGUAGE ENGLISH   LEARNER PREFERENCE PRIMARY DEMONSTRATION     LISTENING     READING     OTHER (COMMENT)   ANSWERED BY Kenzie Garcia     -   RELATIONSHIP SELF       Abuse Screening:  Abuse Screening Questionnaire 5/10/2018   Do you ever feel afraid of your partner? N   Are you in a relationship with someone who physically or mentally threatens you? N   Is it safe for you to go home? Y       Fall Risk  Fall Risk Assessment, last 12 mths 5/10/2018   Able to walk? Yes   Fall in past 12 months? No       Pt currently taking Antiplatelet therapy? No     Coordination of Care:  1. Have you been to the ER, urgent care clinic since your last visit? Hospitalized since your last visit? No     2. Have you seen or consulted any other health care providers outside of the [address] [place] [address] since your last visit? Include any pap smears or colon screening.  No

## 2019-03-22 NOTE — PROGRESS NOTES
HISTORY OF PRESENT ILLNESS  Pamela Ro is a 59 y.o. male. Cholesterol Problem     Follow-up       Patient presents for a follow-up office visit. He has a past medical history significant for dyslipidemia and atrial premature contractions. He was discovered to have a small abdominal aortic aneurysm measuring 2.5 cm in diameter. He also discovered that he has a family history for premature coronary disease. He underwent a regular exercise treadmill stress test in August 2014 which was negative at 6 minutes total exercise time. He underwent an echocardiogram in March 2016 which showed preserved LV systolic function, EF 85-20% with grade 1 diastolic dysfunction and mild aortic valve insufficiency. He was started on Crestor 5 mg daily. He then had a follow-up lipid panel in August 2017 which showed a significant improvement in his total cholesterol and his LDL. His LDL had decreased from 186 down to 118. The patient was last seen in the office approximately 12 months ago. Since last visit he retired and has been having much more free time. As result he is gained about 10 pounds in weight. He denies any heart palpitations, dizziness or syncope. No new chest pain or shortness of breath. No leg swelling or claudication. Past Medical History:   Diagnosis Date    Anxiety     Cardiac echocardiogram 03/04/2016    EF 55-60%. No RWMA. Gr 1 DDfx. Mild AI. No significant valvular heart disease.  Cardiac exercise stress test 08/01/2014    Neg EKG on maximal treadmill stress test.  One 6-beat episode of NSVT. Ex time 6 min.  Cardiovascular aorto-iliac duplex 03/12/2015    No AAA. Patent bilateral CIAs. Patent renal arteries.     Hearing loss     Hemorrhoids     Hypercholesterolemia     has not tolerated some statins in the past    Inguinal hernia, right     Lumbar herniated disc     L4-5, Dr. Leesa Ward PAC (premature atrial contraction)     Prediabetes     S/P colonoscopy 1/29/07    Dr. Dagmar Bermudez, no polyps    S/P colonoscopy 04/05/2017    Moderate diverticulosis of the sigmoid colon / repeat in 10 years per report      Current Outpatient Medications   Medication Sig Dispense Refill    cyanocobalamin (VITAMIN B-12) 1,000 mcg sublingual tablet Take 1 Tab by mouth daily. 90 Tab 3    rosuvastatin (CRESTOR) 5 mg tablet TAKE 1 TABLET NIGHTLY 90 Tab 3    propranolol LA (INDERAL LA) 80 mg SR capsule Take 1 Cap by mouth daily.  naproxen (NAPROSYN) 250 mg tablet Take 250 mg by mouth every eight (8) hours as needed.  JUBLIA connie topical solution       venlafaxine-SR (EFFEXOR XR) 150 mg capsule Take 150 mg by mouth daily. Indications: ANXIETY WITH DEPRESSION         Allergies   Allergen Reactions    Simvastatin Myalgia        Social History     Tobacco Use    Smoking status: Never Smoker    Smokeless tobacco: Never Used   Substance Use Topics    Alcohol use: Yes     Alcohol/week: 0.0 oz    Drug use: No          Review of Systems   Constitutional: Negative for chills, fever and weight loss. HENT: Negative for nosebleeds. Eyes: Negative for blurred vision and double vision. Respiratory: Negative for cough and wheezing. Cardiovascular: Negative for palpitations, orthopnea, claudication, leg swelling and PND. Gastrointestinal: Negative for heartburn, nausea and vomiting. Genitourinary: Negative for dysuria and hematuria. Musculoskeletal: Negative for falls, joint pain and myalgias. Skin: Negative for rash. Neurological: Negative for dizziness and focal weakness. Endo/Heme/Allergies: Does not bruise/bleed easily. Psychiatric/Behavioral: Negative for substance abuse. Visit Vitals  BP (!) 130/94   Pulse 79   Ht 6' 2\" (1.88 m)   Wt 109.8 kg (242 lb)   SpO2 97%   BMI 31.07 kg/m²      Physical Exam   Constitutional: He is oriented to person, place, and time. He appears well-developed and well-nourished.    HENT:   Head: Normocephalic and atraumatic. Eyes: Conjunctivae are normal.   Neck: Neck supple. No JVD present. Carotid bruit is not present. Cardiovascular: Normal rate, regular rhythm, S1 normal, S2 normal and normal pulses. No extrasystoles are present. Exam reveals no gallop and no S3. No murmur heard. Pulmonary/Chest: Breath sounds normal. He has no wheezes. He has no rales. Abdominal: Soft. Bowel sounds are normal. There is no tenderness. Musculoskeletal: He exhibits no edema. Neurological: He is alert and oriented to person, place, and time. Skin: Skin is warm and dry. EKG: Normal sinus rhythm, left axis deviation, right bundle branch block, frequent atrial premature contractions, no ST or T wave abnormalities cncerning for ischemia. Compared to his previous EKG, APCs are now present. ASSESSMENT and PLAN    Right bundle branch block. Patient has left axis deviation as well which can often be seen with a bifascicular block. He has no symptoms concerning for high-grade AV block. I think it is safe to continue his propanolol which was started for essential tremor. Atrial premature contractions. Patient is asymptomatic to the ectopy. He is on a beta-blocker which I would continue. Dyslipidemia. Patient is tolerating low-dose Crestor at 5 mg daily. His lipid panel significantly improved on this regimen. Is being followed by his PCP. Essential tremor. Patient was started on a nonselective beta-blocker for this by his neurologist.  He did try stopping the medication but states his tremor got worse. Followup annually, sooner if needed.

## 2019-05-14 RX ORDER — TADALAFIL 2.5 MG/1
2.5 TABLET ORAL DAILY
Qty: 90 TAB | Refills: 0 | Status: SHIPPED | OUTPATIENT
Start: 2019-05-14 | End: 2019-06-18 | Stop reason: SDUPTHER

## 2019-06-21 RX ORDER — TADALAFIL 2.5 MG/1
2.5 TABLET ORAL DAILY
Qty: 90 TAB | Refills: 3 | Status: SHIPPED | OUTPATIENT
Start: 2019-06-21 | End: 2022-02-09

## 2019-08-14 ENCOUNTER — OFFICE VISIT (OUTPATIENT)
Dept: FAMILY MEDICINE CLINIC | Age: 64
End: 2019-08-14

## 2019-08-14 VITALS
OXYGEN SATURATION: 96 % | HEART RATE: 84 BPM | SYSTOLIC BLOOD PRESSURE: 140 MMHG | BODY MASS INDEX: 29.39 KG/M2 | DIASTOLIC BLOOD PRESSURE: 86 MMHG | WEIGHT: 229 LBS | RESPIRATION RATE: 14 BRPM | TEMPERATURE: 98 F | HEIGHT: 74 IN

## 2019-08-14 DIAGNOSIS — M25.551 RIGHT HIP PAIN: ICD-10-CM

## 2019-08-14 DIAGNOSIS — M25.421 EFFUSION OF OLECRANON BURSA, RIGHT: Primary | ICD-10-CM

## 2019-08-14 DIAGNOSIS — K43.9 ABDOMINAL WALL HERNIA: ICD-10-CM

## 2019-08-14 RX ORDER — METHYLPREDNISOLONE 4 MG/1
TABLET ORAL
Qty: 1 DOSE PACK | Refills: 0 | Status: SHIPPED | OUTPATIENT
Start: 2019-08-14 | End: 2019-09-19

## 2019-08-14 NOTE — PROGRESS NOTES
1. Have you been to the ER, urgent care clinic since your last visit? Hospitalized since your last visit? Yes Where: [facility]     2. Have you seen or consulted any other health care providers outside of the [address] [place] [address] since your last visit? Include any pap smears or colon screening.  No

## 2019-08-14 NOTE — PROGRESS NOTES
SUBJECTIVE  Chief Complaint   Patient presents with    Elbow Injury     right elbow; + swelling; s/p [facility] [address] visit over 1 week ago    Hip Pain     right hip flexor      The patient presents complaining of a 2 month history of swelling over the olecranon. He does not recall an injury. He says that he went to [facility] over 1 week ago and was put on a regimen of ice and compression which helps minimally. They did not perform any films. No fevers, chills, or rashes. Also with right hip pinching at times. Not hurting today. Feels in proximal quad / near groin. Also says his abdominal hernia seems to be more symptomatic. Nothing in office at this time. OBJECTIVE    Blood pressure 140/86, pulse 84, temperature 98 °F (36.7 °C), temperature source Oral, resp. rate 14, height 6' 2\" (1.88 m), weight 229 lb (103.9 kg), SpO2 96 %. General:  alert, cooperative, well appearing, in no apparent distress. Right Elbow:  There is swelling over the olecranon. It is slightly gritty feeling and fluctuant. Nontender. The bilateral elbow is stable without evidence of dislocation. There is full range of motion of the bilateral elbow. Palpation of the elbow demonstrates no muscular defects or masses. Right hip:  Full ROM. Nontender. No pain with int ROM. Skin:  No warmth or erythema. ASSESSMENT / PLAN    ICD-10-CM ICD-9-CM    1. Effusion of olecranon bursa, right M25.421 719.02 XR ELBOW RT AP/LAT   2. Right hip pain M25.551 719.45    3. Abdominal wall hernia K43.9 553.20      Olecranon bursitis - discussed timeframe and uncertainty of how drainable this is at this time. We can attempt or simply let this improve on its own. There is a change he develops a lump there as well if the blood solidifies and scars down. He declines drainage so I offered a course of Medrol as a trial.  If he changes his mind, I advised an x-ray and return visit in 1 week.     Right hip pain - says this is mentioned simply because he is here. He is not keen on treatment or work-up. Certainly the prednisone may help. Sounds like either JAMEY or OA. Abd wall hernia - advised to return to general surgery due to progression of symptoms. 15 minutes of face to face time spent with the patient with at least 50% on counseling on above medical issues. All chart history elements were reviewed by me at the time of the visit even though marked at time of note closure. Patient understands our medical plan. Patient has provided input and agrees with goals. Alternatives have been explained and offered. All questions answered. The patient is to call if condition worsens or fails to improve. Follow-up and Dispositions    · Return as scheduled.

## 2019-08-14 NOTE — PATIENT INSTRUCTIONS
Bursitis of the Elbow: Care Instructions  Your Care Instructions  Bursitis is pain and swelling of the bursae. These are sacs of fluid that help your joints move smoothly. Olecranon bursitis is a type of bursitis that affects the back of the elbow. This is sometimes called Dario elbow because the bump that develops looks like the cartoon character Dario's elbow. Injury, overuse, or prolonged pressure on your elbow can cause this form of bursitis. Sometimes it happens when people have arthritis. It also can occur for unknown reasons. Treatment may include draining fluid from the bursa with a needle. If your doctor thought there was infection, he or she may have prescribed antibiotics. You also may get shots of medicine into the bursa to help the swelling go down. Your elbow should get better in a few days or weeks. Follow-up care is a key part of your treatment and safety. Be sure to make and go to all appointments, and call your doctor if you are having problems. It's also a good idea to know your test results and keep a list of the medicines you take. How can you care for yourself at home? · Take pain medicines exactly as directed. ? If the doctor gave you a prescription medicine for pain, take it as prescribed. ? If you are not taking a prescription pain medicine, ask your doctor if you can take an over-the-counter medicine. ? Do not take two or more pain medicines at the same time unless the doctor told you to. Many pain medicines have acetaminophen, which is Tylenol. Too much acetaminophen (Tylenol) can be harmful. · If your doctor prescribed antibiotics, take them as directed. Do not stop taking them just because you feel better. You need to take the full course of antibiotics. · If your doctor gave you a sling, an elastic bandage, or a compression sleeve, wear it exactly as instructed. · Put ice or a cold pack on your elbow for 10 to 20 minutes at a time.  Try to do this every 1 to 2 hours for the next 3 days (when you are awake) or until the swelling goes down. Put a thin cloth between the ice and your skin. · After 3 days, you can try heat, or alternate heat and ice. · Rest your elbow. Try to stop or reduce any activity that causes pain. · Wear elbow pads during physical activity to prevent injury. · Do not lean your elbows on tables or armrests. When should you call for help? Call your doctor now or seek immediate medical care if:    · You have new or worse symptoms of infection, such as:  ? Increased pain, swelling, warmth, or redness. ? Red streaks leading from the area. ? Pus draining from the area. ? A fever.    Watch closely for changes in your health, and be sure to contact your doctor if:    · You do not get better as expected. Where can you learn more? Go to http://charo-primo.info/. Enter  in the search box to learn more about \"Bursitis of the Elbow: Care Instructions. \"  Current as of: September 20, 2018  Content Version: 12.1  © 0335-1859 Healthwise, Incorporated. Care instructions adapted under license by [employer] (which disclaims liability or warranty for this information). If you have questions about a medical condition or this instruction, always ask your healthcare professional. [address] any warranty or liability for your use of this information.

## 2019-08-19 RX ORDER — ROSUVASTATIN CALCIUM 5 MG/1
TABLET, COATED ORAL
Qty: 90 TAB | Refills: 3 | Status: SHIPPED | OUTPATIENT
Start: 2019-08-19 | End: 2020-08-13

## 2019-09-13 ENCOUNTER — HOSPITAL ENCOUNTER (OUTPATIENT)
Dept: LAB | Age: 64
Discharge: HOME | End: 2019-09-13
Payer: COMMERCIAL

## 2019-09-13 DIAGNOSIS — R73.03 PREDIABETES: ICD-10-CM

## 2019-09-13 DIAGNOSIS — Z12.5 PROSTATE CANCER SCREENING: ICD-10-CM

## 2019-09-13 DIAGNOSIS — E78.00 HYPERCHOLESTEROLEMIA: ICD-10-CM

## 2019-09-13 DIAGNOSIS — E53.8 VITAMIN B12 DEFICIENCY: ICD-10-CM

## 2019-09-13 LAB
ALBUMIN SERPL-MCNC: 3.9 G/DL (ref 3.4–5)
ALBUMIN/GLOB SERPL: 1.3 {RATIO} (ref 0.8–1.7)
ALP SERPL-CCNC: 53 U/L (ref 45–117)
ALT SERPL-CCNC: 50 U/L (ref 16–61)
ANION GAP SERPL CALC-SCNC: 3 MMOL/L (ref 3–18)
AST SERPL-CCNC: 36 U/L (ref 10–38)
BILIRUB SERPL-MCNC: 0.6 MG/DL (ref 0.2–1)
BUN SERPL-MCNC: 16 MG/DL (ref 7–18)
BUN/CREAT SERPL: 16 (ref 12–20)
CALCIUM SERPL-MCNC: 9.1 MG/DL (ref 8.5–10.1)
CHLORIDE SERPL-SCNC: 107 MMOL/L (ref 100–111)
CHOLEST SERPL-MCNC: 244 MG/DL
CO2 SERPL-SCNC: 30 MMOL/L (ref 21–32)
CREAT SERPL-MCNC: 1 MG/DL (ref 0.6–1.3)
ERYTHROCYTE [DISTWIDTH] IN BLOOD BY AUTOMATED COUNT: 13.9 % (ref 11.6–14.5)
GLOBULIN SER CALC-MCNC: 3.1 G/DL (ref 2–4)
GLUCOSE SERPL-MCNC: 102 MG/DL (ref 74–99)
HBA1C MFR BLD: 5.5 % (ref 4.2–5.6)
HCT VFR BLD AUTO: 45.9 % (ref 36–48)
HDLC SERPL-MCNC: 84 MG/DL (ref 40–60)
HDLC SERPL: 2.9 {RATIO} (ref 0–5)
HGB BLD-MCNC: 15.3 G/DL (ref 13–16)
LDLC SERPL CALC-MCNC: 142 MG/DL (ref 0–100)
LIPID PROFILE,FLP: ABNORMAL
MCH RBC QN AUTO: 30.7 PG (ref 24–34)
MCHC RBC AUTO-ENTMCNC: 33.3 G/DL (ref 31–37)
MCV RBC AUTO: 92.2 FL (ref 74–97)
PLATELET # BLD AUTO: 177 K/UL (ref 135–420)
PMV BLD AUTO: 10 FL (ref 9.2–11.8)
POTASSIUM SERPL-SCNC: 4.7 MMOL/L (ref 3.5–5.5)
PROT SERPL-MCNC: 7 G/DL (ref 6.4–8.2)
PSA SERPL-MCNC: 1.1 NG/ML (ref 0–4)
RBC # BLD AUTO: 4.98 M/UL (ref 4.7–5.5)
SODIUM SERPL-SCNC: 140 MMOL/L (ref 136–145)
TRIGL SERPL-MCNC: 90 MG/DL (ref ?–150)
VIT B12 SERPL-MCNC: 552 PG/ML (ref 211–911)
VLDLC SERPL CALC-MCNC: 18 MG/DL
WBC # BLD AUTO: 4.6 K/UL (ref 4.6–13.2)

## 2019-09-13 PROCEDURE — 84153 ASSAY OF PSA TOTAL: CPT

## 2019-09-13 PROCEDURE — 80053 COMPREHEN METABOLIC PANEL: CPT

## 2019-09-13 PROCEDURE — 36415 COLL VENOUS BLD VENIPUNCTURE: CPT

## 2019-09-13 PROCEDURE — 83036 HEMOGLOBIN GLYCOSYLATED A1C: CPT

## 2019-09-13 PROCEDURE — 80061 LIPID PANEL: CPT

## 2019-09-13 PROCEDURE — 82607 VITAMIN B-12: CPT

## 2019-09-13 PROCEDURE — 85027 COMPLETE CBC AUTOMATED: CPT

## 2019-09-19 ENCOUNTER — OFFICE VISIT (OUTPATIENT)
Dept: FAMILY MEDICINE CLINIC | Age: 64
End: 2019-09-19

## 2019-09-19 VITALS
HEART RATE: 48 BPM | WEIGHT: 229 LBS | TEMPERATURE: 97.8 F | RESPIRATION RATE: 14 BRPM | SYSTOLIC BLOOD PRESSURE: 114 MMHG | OXYGEN SATURATION: 97 % | BODY MASS INDEX: 29.39 KG/M2 | DIASTOLIC BLOOD PRESSURE: 76 MMHG | HEIGHT: 74 IN

## 2019-09-19 DIAGNOSIS — G25.0 BENIGN ESSENTIAL TREMOR: ICD-10-CM

## 2019-09-19 DIAGNOSIS — I45.10 RBBB: ICD-10-CM

## 2019-09-19 DIAGNOSIS — N52.9 ERECTILE DYSFUNCTION, UNSPECIFIED ERECTILE DYSFUNCTION TYPE: ICD-10-CM

## 2019-09-19 DIAGNOSIS — Z23 ENCOUNTER FOR IMMUNIZATION: ICD-10-CM

## 2019-09-19 DIAGNOSIS — I35.1 AORTIC VALVE INSUFFICIENCY, ETIOLOGY OF CARDIAC VALVE DISEASE UNSPECIFIED: ICD-10-CM

## 2019-09-19 DIAGNOSIS — E78.00 HYPERCHOLESTEROLEMIA: ICD-10-CM

## 2019-09-19 DIAGNOSIS — I49.1 PAC (PREMATURE ATRIAL CONTRACTION): ICD-10-CM

## 2019-09-19 DIAGNOSIS — R73.03 PREDIABETES: Primary | ICD-10-CM

## 2019-09-19 DIAGNOSIS — E53.8 VITAMIN B12 DEFICIENCY: ICD-10-CM

## 2019-09-19 NOTE — PATIENT INSTRUCTIONS
A Healthy Lifestyle: Care Instructions  Your Care Instructions    A healthy lifestyle can help you feel good, stay at a healthy weight, and have plenty of energy for both work and play. A healthy lifestyle is something you can share with your whole family. A healthy lifestyle also can lower your risk for serious health problems, such as high blood pressure, heart disease, and diabetes. You can follow a few steps listed below to improve your health and the health of your family. Follow-up care is a key part of your treatment and safety. Be sure to make and go to all appointments, and call your doctor if you are having problems. It's also a good idea to know your test results and keep a list of the medicines you take. How can you care for yourself at home? · Do not eat too much sugar, fat, or fast foods. You can still have dessert and treats now and then. The goal is moderation. · Start small to improve your eating habits. Pay attention to portion sizes, drink less juice and soda pop, and eat more fruits and vegetables. ? Eat a healthy amount of food. A 3-ounce serving of meat, for example, is about the size of a deck of cards. Fill the rest of your plate with vegetables and whole grains. ? Limit the amount of soda and sports drinks you have every day. Drink more water when you are thirsty. ? Eat at least 5 servings of fruits and vegetables every day. It may seem like a lot, but it is not hard to reach this goal. A serving or helping is 1 piece of fruit, 1 cup of vegetables, or 2 cups of leafy, raw vegetables. Have an apple or some carrot sticks as an afternoon snack instead of a candy bar. Try to have fruits and/or vegetables at every meal.  · Make exercise part of your daily routine. You may want to start with simple activities, such as walking, bicycling, or slow swimming. Try to be active 30 to 60 minutes every day. You do not need to do all 30 to 60 minutes all at once.  For example, you can exercise 3 times a day for 10 or 20 minutes. Moderate exercise is safe for most people, but it is always a good idea to talk to your doctor before starting an exercise program.  · Keep moving. Bony  the lawn, work in the garden, or [employer]. Take the stairs instead of the elevator at work. · If you smoke, quit. People who smoke have an increased risk for heart attack, stroke, cancer, and other lung illnesses. Quitting is hard, but there are ways to boost your chance of quitting tobacco for good. ? Use nicotine gum, patches, or lozenges. ? Ask your doctor about stop-smoking programs and medicines. ? Keep trying. In addition to reducing your risk of diseases in the future, you will notice some benefits soon after you stop using tobacco. If you have shortness of breath or asthma symptoms, they will likely get better within a few weeks after you quit. · Limit how much alcohol you drink. Moderate amounts of alcohol (up to 2 drinks a day for men, 1 drink a day for women) are okay. But drinking too much can lead to liver problems, high blood pressure, and other health problems. Family health  If you have a family, there are many things you can do together to improve your health. · Eat meals together as a family as often as possible. · Eat healthy foods. This includes fruits, vegetables, lean meats and dairy, and whole grains. · Include your family in your fitness plan. Most people think of activities such as jogging or tennis as the way to fitness, but there are many ways you and your family can be more active. Anything that makes you breathe hard and gets your heart pumping is exercise. Here are some tips:  ? Walk to do errands or to take your child to school or the bus.  ? Go for a family bike ride after dinner instead of watching TV. Where can you learn more? Go to http://charo-primo.info/. Enter Z731 in the search box to learn more about \"A Healthy Lifestyle: Care Instructions. \"  Current as of: September 11, 2018  Content Version: 12.1  © 7233-6222 Healthwise, Incorporated. Care instructions adapted under license by [employer] (which disclaims liability or warranty for this information). If you have questions about a medical condition or this instruction, always ask your healthcare professional. [address] any warranty or liability for your use of this information.

## 2019-09-19 NOTE — PROGRESS NOTES
SUBJECTIVE  Chief Complaint   Patient presents with    Results    Cholesterol Problem     Patient presents for follow-up on their prediabetes and hypercholesterolemia. He has had recent labs. He has had resolution of his right elbow bursitis after a course of steroids. No complaints. He is seeing cardiology for management of cardiac risk factor modification. He is on a low dose Crestor 5mg daily. He has had a work-up for tremors in his hands and is taking Inderal daily which helps. He had labs showing a vit B deficiency and has started a supplement on his own. The neurologist diagnosed him with a benign essential tremor and was advised that there was no current concern about parkinson's. ROS:  History obtained from the patient  · Respiratory: no cough, shortness of breath, or wheezing  · Cardiovascular: no chest pain, palpitations, or dyspnea on exertion  · Gastrointestinal: no abdominal pain, N/V    OBJECTIVE  Blood pressure 114/76, pulse (!) 48, temperature 97.8 °F (36.6 °C), temperature source Oral, resp. rate 14, height 6' 2\" (1.88 m), weight 229 lb (103.9 kg), SpO2 97 %. General:  alert, cooperative, well appearing, in no apparent distress. CV:  The heart sounds are regular in rate and rhythm. There is a normal S1 and S2. There or no murmurs. Lungs: Inspiratory and expiratory efforts are full and unlabored. Lung sounds are clear and equal to auscultation throughout all lung fields without wheezing, rales, or rhonchi. Right elbow:  Full ROM. No erythema. Nontender. No swelling. Psych: normal affect. Mood good. Oriented x 3. Judgement and insight intact.      Results for orders placed or performed during the hospital encounter of 09/13/19   CBC W/O DIFF   Result Value Ref Range    WBC 4.6 4.6 - 13.2 K/uL    RBC 4.98 4.70 - 5.50 M/uL    HGB 15.3 13.0 - 16.0 g/dL    HCT 45.9 36.0 - 48.0 %    MCV 92.2 74.0 - 97.0 FL    MCH 30.7 24.0 - 34.0 PG    MCHC 33.3 31.0 - 37.0 g/dL RDW 13.9 11.6 - 14.5 %    PLATELET 954 935 - 659 K/uL    MPV 10.0 9.2 - 30.3 FL   METABOLIC PANEL, COMPREHENSIVE   Result Value Ref Range    Sodium 140 136 - 145 mmol/L    Potassium 4.7 3.5 - 5.5 mmol/L    Chloride 107 100 - 111 mmol/L    CO2 30 21 - 32 mmol/L    Anion gap 3 3.0 - 18 mmol/L    Glucose 102 (H) 74 - 99 mg/dL    BUN 16 7.0 - 18 MG/DL    Creatinine 1.00 0.6 - 1.3 MG/DL    BUN/Creatinine ratio 16 12 - 20      GFR est AA >60 >60 ml/min/1.73m2    GFR est non-AA >60 >60 ml/min/1.73m2    Calcium 9.1 8.5 - 10.1 MG/DL    Bilirubin, total 0.6 0.2 - 1.0 MG/DL    ALT (SGPT) 50 16 - 61 U/L    AST (SGOT) 36 10 - 38 U/L    Alk. phosphatase 53 45 - 117 U/L    Protein, total 7.0 6.4 - 8.2 g/dL    Albumin 3.9 3.4 - 5.0 g/dL    Globulin 3.1 2.0 - 4.0 g/dL    A-G Ratio 1.3 0.8 - 1.7     LIPID PANEL   Result Value Ref Range    LIPID PROFILE          Cholesterol, total 244 (H) <200 MG/DL    Triglyceride 90 <150 MG/DL    HDL Cholesterol 84 (H) 40 - 60 MG/DL    LDL, calculated 142 (H) 0 - 100 MG/DL    VLDL, calculated 18 MG/DL    CHOL/HDL Ratio 2.9 0 - 5.0     HEMOGLOBIN A1C W/O EAG   Result Value Ref Range    Hemoglobin A1c 5.5 4.2 - 5.6 %   PSA SCREENING (SCREENING)   Result Value Ref Range    Prostate Specific Ag 1.1 0.0 - 4.0 ng/mL   VITAMIN B12   Result Value Ref Range    Vitamin B12 552 211 - 911 pg/mL     ASSESSMENT / PLAN    ICD-10-CM ICD-9-CM    1. Prediabetes R73.03 790.29    2. Hypercholesterolemia E78.00 272.0    3. PAC (premature atrial contraction) I49.1 427.61    4. RBBB I45.10 426.4    5. Aortic valve insufficiency, etiology of cardiac valve disease unspecified I35.1 424.1    6. Benign essential tremor G25.0 333.1    7. Body mass index (BMI) of 25.0 to 29.9 E66.3 278.02    8. Vitamin B12 deficiency E53.8 266.2    9. Erectile dysfunction, unspecified erectile dysfunction type N52.9 607.84    10.  Encounter for immunization Z23 V03.89 [place] IMMUNIZ ADMIN,1 SINGLE/COMB VAC/TOXOID      INFLUENZA VIRUS VAC QUAD,SPLIT,PRESV FREE SYRINGE IM     Prediabetes - A1c looks great. Only minimal elevation of A1c. Continue healthy living habits. Hypercholesterolemia - Cont per cardiologist with Crestor 5mg nightly. LFTs and lipids reviewed. No hepatotoxicity. PAC / RBBB/ aortic valve insufficiency - cont per cardiology. Notes reviewed. Notes do not indicate a need for a 2D ECHO according to cardiology. Benign essential tremor - cont per neurology. We can prescribe his Inderal if needed. BMI>25 - diet and exercise. Vit B12 def - recommended vit B12 1,000mcg SL daily. Levels are normal.     ED - refills of Cialis as needed. Flu vaccine administered. All chart history elements were reviewed by me at the time of the visit even though marked at time of note closure. Patient understands our medical plan. Patient has provided input and agrees with goals. Alternatives have been explained and offered. All questions answered. The patient is to call if condition worsens or fails to improve.      Follow-up and Dispositions    · Return in about 6 months (around 3/19/2020) for routine care and Welcome to [facility] .

## 2019-09-19 NOTE — PROGRESS NOTES
1. Have you been to the ER, urgent care clinic since your last visit? Hospitalized since your last visit? No    2. Have you seen or consulted any other health care providers outside of the [address] [place] [address] since your last visit? Include any pap smears or colon screening. No    Physician order obtained. Patient completed adult immunization consent form. Allergies, contraindications and recommendations reviewed with patient. Seasonal influenza vaccine administered IM left deltoid. Patient tolerated well. Patient remained in office for 15 minutes after injection and no adverse reactions were noted.

## 2020-04-07 ENCOUNTER — PATIENT MESSAGE (OUTPATIENT)
Dept: FAMILY MEDICINE CLINIC | Age: 65
End: 2020-04-07

## 2020-04-07 RX ORDER — PROPRANOLOL HYDROCHLORIDE 80 MG/1
80 CAPSULE, EXTENDED RELEASE ORAL DAILY
Qty: 90 CAP | Refills: 0 | Status: SHIPPED | OUTPATIENT
Start: 2020-04-07 | End: 2020-07-15

## 2020-04-07 NOTE — TELEPHONE ENCOUNTER
Matty Kasey 4/7/2020 12:11 PM EDT      ----- Message -----  From: Marylee Santa  Sent: 4/7/2020 12:09 PM EDT  To: [facility]  Subject: Carlos Pelayo;  I trust things are good with you. I have a question about meds. You may recall I have been taking Propranolol 80mg for a tremble in my hands. The prescription was written by Dr. Hortencia Velasquez. A couple months ago I ordered a refill, but it required a new script. [facility] tried several times to contact Dr. Segundo Hughes office for a new script, but never got a response. I have tried to call the office, but only get the answering service. My question to you is, would you consider writing me a script for Propranolol 80mg? If so, it can be faxed to [facility] at [phone]. That's an institutional pharmacy where my wife is a tech. Thanks, and be well.     Elsi Casey

## 2020-07-15 RX ORDER — PROPRANOLOL HYDROCHLORIDE 80 MG/1
CAPSULE, EXTENDED RELEASE ORAL
Qty: 30 CAP | Refills: 1 | Status: SHIPPED | OUTPATIENT
Start: 2020-07-15 | End: 2020-09-09

## 2020-08-13 RX ORDER — ROSUVASTATIN CALCIUM 5 MG/1
TABLET, COATED ORAL
Qty: 90 TAB | Refills: 3 | Status: SHIPPED | OUTPATIENT
Start: 2020-08-13 | End: 2021-08-10

## 2020-09-09 RX ORDER — PROPRANOLOL HYDROCHLORIDE 80 MG/1
CAPSULE, EXTENDED RELEASE ORAL
Qty: 30 CAP | Refills: 0 | Status: SHIPPED | OUTPATIENT
Start: 2020-09-09 | End: 2021-11-29

## 2020-11-23 ENCOUNTER — TELEPHONE (OUTPATIENT)
Dept: FAMILY MEDICINE CLINIC | Age: 65
End: 2020-11-23

## 2020-11-23 NOTE — TELEPHONE ENCOUNTER
Have you been diagnosed with, tested for, or told that you are suspected of having COVID-19 (coronovirus)? NO  Have you had a fever or taken medication to treat a fever in the past 72 hours? NO  Have you had a cough, SOB, or flu-like symptoms within the past 3 days? NO  Have you had direct contact with someone who tested positive for COVID-19 within the past 14 days? NO  Do you have a household member with flu-like symptoms, including fever, cough, or SOB? NO   Do you currently have flu-like symptoms including fever, cough, or SOB? NO  Are you experiencing new loss of taste or smell?  NO

## 2020-11-24 ENCOUNTER — OFFICE VISIT (OUTPATIENT)
Dept: FAMILY MEDICINE CLINIC | Age: 65
End: 2020-11-24
Payer: MEDICARE

## 2020-11-24 VITALS
DIASTOLIC BLOOD PRESSURE: 80 MMHG | HEART RATE: 65 BPM | RESPIRATION RATE: 18 BRPM | HEIGHT: 74 IN | OXYGEN SATURATION: 97 % | TEMPERATURE: 98.9 F | BODY MASS INDEX: 30.34 KG/M2 | SYSTOLIC BLOOD PRESSURE: 118 MMHG | WEIGHT: 236.38 LBS

## 2020-11-24 DIAGNOSIS — N52.9 ERECTILE DYSFUNCTION, UNSPECIFIED ERECTILE DYSFUNCTION TYPE: ICD-10-CM

## 2020-11-24 DIAGNOSIS — Z12.5 PROSTATE CANCER SCREENING: ICD-10-CM

## 2020-11-24 DIAGNOSIS — Z00.00 WELCOME TO MEDICARE PREVENTIVE VISIT: Primary | ICD-10-CM

## 2020-11-24 DIAGNOSIS — E53.8 VITAMIN B12 DEFICIENCY: ICD-10-CM

## 2020-11-24 DIAGNOSIS — R73.01 IFG (IMPAIRED FASTING GLUCOSE): Primary | ICD-10-CM

## 2020-11-24 DIAGNOSIS — I35.1 AORTIC VALVE INSUFFICIENCY, ETIOLOGY OF CARDIAC VALVE DISEASE UNSPECIFIED: ICD-10-CM

## 2020-11-24 DIAGNOSIS — R73.03 PREDIABETES: ICD-10-CM

## 2020-11-24 DIAGNOSIS — I45.10 RBBB: ICD-10-CM

## 2020-11-24 DIAGNOSIS — G25.0 BENIGN ESSENTIAL TREMOR: ICD-10-CM

## 2020-11-24 DIAGNOSIS — K42.9 UMBILICAL HERNIA WITHOUT OBSTRUCTION AND WITHOUT GANGRENE: ICD-10-CM

## 2020-11-24 DIAGNOSIS — I49.1 PAC (PREMATURE ATRIAL CONTRACTION): ICD-10-CM

## 2020-11-24 DIAGNOSIS — E78.00 HYPERCHOLESTEROLEMIA: ICD-10-CM

## 2020-11-24 DIAGNOSIS — Z23 ENCOUNTER FOR IMMUNIZATION: ICD-10-CM

## 2020-11-24 PROCEDURE — 3017F COLORECTAL CA SCREEN DOC REV: CPT | Performed by: FAMILY MEDICINE

## 2020-11-24 PROCEDURE — 99214 OFFICE O/P EST MOD 30 MIN: CPT | Performed by: FAMILY MEDICINE

## 2020-11-24 PROCEDURE — G8510 SCR DEP NEG, NO PLAN REQD: HCPCS | Performed by: FAMILY MEDICINE

## 2020-11-24 PROCEDURE — G8417 CALC BMI ABV UP PARAM F/U: HCPCS | Performed by: FAMILY MEDICINE

## 2020-11-24 PROCEDURE — 90670 PCV13 VACCINE IM: CPT | Performed by: FAMILY MEDICINE

## 2020-11-24 PROCEDURE — G8536 NO DOC ELDER MAL SCRN: HCPCS | Performed by: FAMILY MEDICINE

## 2020-11-24 PROCEDURE — G0402 INITIAL PREVENTIVE EXAM: HCPCS | Performed by: FAMILY MEDICINE

## 2020-11-24 PROCEDURE — G0009 ADMIN PNEUMOCOCCAL VACCINE: HCPCS | Performed by: FAMILY MEDICINE

## 2020-11-24 PROCEDURE — 1101F PT FALLS ASSESS-DOCD LE1/YR: CPT | Performed by: FAMILY MEDICINE

## 2020-11-24 PROCEDURE — G8427 DOCREV CUR MEDS BY ELIG CLIN: HCPCS | Performed by: FAMILY MEDICINE

## 2020-11-24 NOTE — PATIENT INSTRUCTIONS
Medicare Wellness Visit, Male The best way to live healthy is to have a lifestyle where you eat a well-balanced diet, exercise regularly, limit alcohol use, and quit all forms of tobacco/nicotine, if applicable. Regular preventive services are another way to keep healthy. Preventive services (vaccines, screening tests, monitoring & exams) can help personalize your care plan, which helps you manage your own care. Screening tests can find health problems at the earliest stages, when they are easiest to treat. [facility] follows the current, evidence-based guidelines published by the [facility] ([facility]) when recommending preventive services for our patients. Because we follow these guidelines, sometimes recommendations change over time as research supports it. (For example, a prostate screening blood test is no longer routinely recommended for men with no symptoms). Of course, you and your doctor may decide to screen more often for some diseases, based on your risk and co-morbidities (chronic disease you are already diagnosed with). Preventive services for you include: - Medicare offers their members a free annual wellness visit, which is time for you and your primary care provider to discuss and plan for your preventive service needs. Take advantage of this benefit every year! 
-All adults over age 72 should receive the recommended pneumonia vaccines. Current USPSTF guidelines recommend a series of two vaccines for the best pneumonia protection.  
-All adults should have a flu vaccine yearly and tetanus vaccine every 10 years. 
-All adults age 48 and older should receive the shingles vaccines (series of two vaccines).       
-All adults age 38-68 who are overweight should have a diabetes screening test once every three years.  
-Other screening tests & preventive services for persons with diabetes include: an eye exam to screen for diabetic retinopathy, a kidney function test, a foot exam, and stricter control over your cholesterol.  
-Cardiovascular screening for adults with routine risk involves an electrocardiogram (ECG) at intervals determined by the provider.  
-Colorectal cancer screening should be done for adults age 54-65 with no increased risk factors for colorectal cancer. There are a number of acceptable methods of screening for this type of cancer. Each test has its own benefits and drawbacks. Discuss with your provider what is most appropriate for you during your annual wellness visit. The different tests include: colonoscopy (considered the best screening method), a fecal occult blood test, a fecal DNA test, and sigmoidoscopy. 
-All adults born between [facility] should be screened once for Hepatitis C. 
-An Abdominal Aortic Aneurysm (AAA) Screening is recommended for men age 73-68 who has ever smoked in their lifetime. Here is a list of your current Health Maintenance items (your personalized list of preventive services) with a due date: 
Health Maintenance Due Topic Date Due  Glaucoma Screening   01/10/2020  Pneumococcal Vaccine (1 of 1 - PPSV23) 01/10/2020  Hemoglobin A1C    09/13/2020  Cholesterol Test   09/13/2020

## 2020-11-24 NOTE — PROGRESS NOTES
1. Have you been to the ER, urgent care clinic since your last visit? Hospitalized since your last visit? No     2. Have you seen or consulted any other health care providers outside of the [address] [place] [address] since your last visit? Include any pap smears or colon screening. No     Abuse Screening Questionnaire 11/24/2020   Do you ever feel afraid of your partner? N   Are you in a relationship with someone who physically or mentally threatens you? N   Is it safe for you to go home? Y     Fall Risk Assessment, last 12 mths 11/24/2020   Able to walk? Yes   Fall in past 12 months?  No     3 most recent PHQ Screens 11/24/2020   Little interest or pleasure in doing things Not at all   Feeling down, depressed, irritable, or hopeless Not at all   Total Score PHQ 2 0       Vision completed (20/25 with correction - snellen chart)

## 2020-11-24 NOTE — PATIENT INSTRUCTIONS
Pneumococcal Conjugate Vaccine (PCV13): What You Need to Know Why get vaccinated? Pneumococcal conjugate vaccine (PCV13) can prevent pneumococcal disease. Pneumococcal disease refers to any illness caused by pneumococcal bacteria. These bacteria can cause many types of illnesses, including pneumonia, which is an infection of the lungs. Pneumococcal bacteria are one of the most common causes of pneumonia. Besides pneumonia, pneumococcal bacteria can also cause: 
· Ear infections · Sinus infections · Meningitis (infection of the tissue covering the brain and spinal cord) · Bacteremia (bloodstream infection) Anyone can get pneumococcal disease, but children under 3years of age, people with certain medical conditions, adults 72 years or older, and cigarette smokers are at the highest risk. Most pneumococcal infections are mild. However, some can result in long-term problems, such as brain damage or hearing loss. Meningitis, bacteremia, and pneumonia caused by pneumococcal disease can be fatal. 
PCV13 PCV13 protects against 13 types of bacteria that cause pneumococcal disease. Infants and young children usually need 4 doses of pneumococcal conjugate vaccine, at 2, 4, 6, and 15 13months of age. In some cases, a child might need fewer than 4 doses to complete PCV13 vaccination. A dose of PCV13 vaccine is also recommended for anyone 2 years or older with certain medical conditions if they did not already receive PCV13. This vaccine may be given to adults 72 years or older based on discussions between the patient and health care provider. Talk with your health care provider Tell your vaccine provider if the person getting the vaccine: 
· Has had an allergic reaction after a previous dose of PCV13, to an earlier pneumococcal conjugate vaccine known as PCV7, or to any vaccine containing diphtheria toxoid (for example, DTaP), or has any severe, life-threatening allergies. · In some cases, your health care provider may decide to postpone PCV13 vaccination to a future visit. People with minor illnesses, such as a cold, may be vaccinated. People who are moderately or severely ill should usually wait until they recover before getting PCV13. Your health care provider can give you more information. Risks of a vaccine reaction · Redness, swelling, pain, or tenderness where the shot is given, and fever, loss of appetite, fussiness (irritability), feeling tired, headache, and chills can happen after PCV13. [facility] children may be at increased risk for seizures caused by fever after PCV13 if it is administered at the same time as inactivated influenza vaccine. Ask your health care provider for more information. People sometimes faint after medical procedures, including vaccination. Tell your provider if you feel dizzy or have vision changes or ringing in the ears. As with any medicine, there is a very remote chance of a vaccine causing a severe allergic reaction, other serious injury, or death. What if there is a serious problem? An allergic reaction could occur after the vaccinated person leaves the clinic. If you see signs of a severe allergic reaction (hives, swelling of the face and throat, difficulty breathing, a fast heartbeat, dizziness, or weakness), call 9-1-1 and get the person to the nearest hospital. 
For other signs that concern you, call your health care provider. Adverse reactions should be reported to the Vaccine Adverse Event Reporting System (VAERS). Your health care provider will usually file this report, or you can do it yourself. Visit the VAERS website at www.vaers. hhs.gov or call 2-766.726.3587. VAERS is only for reporting reactions, and VAERS staff do not give medical advice.  
The [place] Vaccine Injury Compensation Program 
The [place] Vaccine Injury Compensation Program (VICP) is a federal program that was created to compensate people who may have been injured by certain vaccines. Visit the VICP website at www.[facility].gov/vaccinecompensation or call 3-898.894.6125 to learn about the program and about filing a claim. There is a time limit to file a claim for compensation. How can I learn more? · Ask your health care provider. · Call your local or state health department. · Contact the Centers for Disease Control and Prevention (CDC): 
? Call 6-782.568.7346 (1-800-CDC-INFO) or 
? Visit CDC's website at www.cdc.gov/vaccines Vaccine Information Statement (Interim) PCV13 
10/30/2019 
[address] [id number] [employer] Centers for Disease Control and Prevention Many Vaccine Information Statements are available in [place] and other languages. See www.immunize.org/vis. Muchas hojas de información sobre vacunas están disponibles en español y en otros idiomas. Visite www.immunize.org/vis. Care instructions adapted under license by [employer] (which disclaims liability or warranty for this information). If you have questions about a medical condition or this instruction, always ask your healthcare professional. [place] [address] any warranty or liability for your use of this information.

## 2020-11-24 NOTE — ACP (ADVANCE CARE PLANNING)
Advance Care Planning     General Advance Care Planning (ACP) Conversation      Date of Conversation: 11/24/2020  Conducted with: Patient with [address] Decision Maker:     Primary Decision Maker: Grant Hoover - [phone]    Today we documented Decision Maker(s) consistent with Legal Next of Kin hierarchy.     Content/Action Overview:   Has NO ACP documents/care preferences - information provided, considering goals and options  Reviewed DNR/DNI and patient elects Full Code (Attempt Resuscitation)      Length of Voluntary ACP Conversation in minutes:  <16 minutes (Non-Billable)    Mian Mejia MD

## 2020-11-24 NOTE — PROGRESS NOTES
Chief Complaint   Patient presents with    Annual Wellness Visit     This is a \"Welcome to [employer]"  Initial Preventive Physical Examination (IPPE) providing Personalized Prevention Plan Services (Performed in the first 12 months of enrollment)    I have reviewed the patient's medical history in detail and updated the computerized patient record. Depression Risk Screen     3 most recent PHQ Screens 11/24/2020   Little interest or pleasure in doing things Not at all   Feeling down, depressed, irritable, or hopeless Not at all   Total Score PHQ 2 0       Alcohol Risk Screen   Do you average more than 1 drink per night or more than 7 drinks a week: No    In the past three months have you have had more than 4 drinks containing alcohol on one occasion: No          Functional Ability and Level of Safety   Diet: No special diet     Hearing:  Has hearing loss and has to complete his hearing aid assessment since his aids currently don't work. He is overdue due to the pandemic. Vision Screening:  Vision is good. Visual Acuity Screening    Right eye Left eye Both eyes   Without correction:      With correction: 20/25 20/25 20/25   Comments: Snellen Chart        Activities of Daily Living: The home contains: no safety equipment. Patient does total self care     Ambulation: with no difficulty     Exercise level: moderately active     Fall Risk Screen:  Fall Risk Assessment, last 12 mths 11/24/2020   Able to walk? Yes   Fall in past 12 months? No     Abuse Screen:  Patient is not abused       Screening EKG   EKG order placed: sees cardiology. Soon to see. End of Life Planning   Advanced care planning directives were discussed with the patient and /or family/caregiver. Assessment/Plan   Education and counseling provided:  Are appropriate based on today's review and evaluation    Diagnoses and all orders for this visit:    1.  Welcome to [facility] preventive visit        Health Maintenance Due     [facility] Maintenance Due   Topic Date Due    GLAUCOMA SCREENING Q2Y  01/10/2020    Pneumococcal 65+ years (1 of 1 - PPSV23) 01/10/2020    A1C test (Diabetic or Prediabetic)  09/13/2020    Lipid Screen  09/13/2020       Patient Care Team   Patient Care Team:  Manuel Vázquez MD as PCP - General (Family Medicine)  Manuel Vázquez MD as PCP - [facility] Provider  Christina Landry MD (Neurosurgery)  Kaitlynn Collins MD (Cardiology)  Az Velazquez MD (General Surgery)  Tonja Harada, MD (Dermatology)  Celeste Nina MD (Psychiatry)  Erika Pacheco (Physician Assistant)  Marly Vernon MD (Gastroenterology)  Olvin Hendricks MD (Orthopedic Surgery)  Martha Majano MD as Surgeon (Surgery)    History     Past Medical History:   Diagnosis Date    Anxiety     Cardiac echocardiogram 03/04/2016    EF 55-60%. No RWMA. Gr 1 DDfx. Mild AI. No significant valvular heart disease.  Cardiac exercise stress test 08/01/2014    Neg EKG on maximal treadmill stress test.  One 6-beat episode of NSVT. Ex time 6 min.  Cardiovascular aorto-iliac duplex 03/12/2015    No AAA. Patent bilateral CIAs. Patent renal arteries.     Hearing loss     left ear    Hemorrhoids     Hypercholesterolemia     has not tolerated some statins in the past    Inguinal hernia, right     Lumbar herniated disc     L4-5, Dr. Ledesma Pump PAC (premature atrial contraction)     Prediabetes     S/P colonoscopy 1/29/07    Dr. Thomason Lipoma, no polyps    S/P colonoscopy 04/05/2017    Moderate diverticulosis of the sigmoid colon / repeat in 10 years per report      Past Surgical History:   Procedure Laterality Date    HX BACK SURGERY  7/21/2011    Dr. Diamond Contreras  11/12    right inguinal    HX KNEE REPLACEMENT Right 09/07/2016    HX SHOULDER ARTHROSCOPY Left 07/20/2015    Dr. Rios [facility] / Rotator Cuff Repair    HX TONSILLECTOMY  17yo     Current Outpatient Medications   Medication Sig Dispense Refill    propranolol LA (INDERAL LA) 80 mg SR capsule TAKE 1 CAPSULE BY MOUTH ONCE DAILY 30 Cap 0    rosuvastatin (CRESTOR) 5 mg tablet TAKE 1 TABLET NIGHTLY 90 Tab 3    cyanocobalamin (VITAMIN B-12) 1,000 mcg sublingual tablet Take 1 Tab by mouth daily. 90 Tab 3    venlafaxine-SR (EFFEXOR XR) 150 mg capsule Take 150 mg by mouth daily. Indications: ANXIETY WITH DEPRESSION      tadalafil (CIALIS) 2.5 mg tablet Take 1 Tab by mouth daily. 90 Tab 3    naproxen (NAPROSYN) 250 mg tablet Take 250 mg by mouth every eight (8) hours as needed.  JUBLIA connie topical solution Apply  to affected area daily. Allergies   Allergen Reactions    Simvastatin Myalgia       Family History   Problem Relation Age of Onset    Alcohol abuse Father     Cancer Father 79        lung    Coronary Artery Disease Father         46s    Emphysema Father     Heart Failure Father     Other Father         AAA    Heart Attack Maternal Grandmother     Other Brother         AAA    Coronary Artery Disease Brother 77     Social History     Tobacco Use    Smoking status: Never Smoker    Smokeless tobacco: Never Used   Substance Use Topics    Alcohol use: Yes     Alcohol/week: 0.0 standard drinks             ICD-10-CM ICD-9-CM    1. Welcome to Medicare preventive visit  Z00.00 V70.0      Age and sex specific counseling. Screening for depression and alcoholism. Advanced directives / end of life planning discussion and packet provided for review at home. Care team updated. Vaccines are not up to date. We will start his pneumonia vaccine series today. Medicare recommended screening and prevention test table is filled out, reviewed, and provided to patient. Patient understands our medical plan. Patient has provided input and agrees with goals. All questions answered.

## 2020-11-24 NOTE — PROGRESS NOTES
SUBJECTIVE  Chief Complaint   Patient presents with    Cholesterol Problem    Erectile Dysfunction    Other     PAC / Pre-DM      Patient presents for follow-up on their prediabetes and hypercholesterolemia. He is overdue because of the pandemic. He is seeing cardiology for management of cardiac risk factor modification however he is overdue. He is on a low dose Crestor 5mg daily. He has had a work-up for tremors in his hands and is taking Inderal daily which helps. He sees neurology for that. He had labs showing a vit B deficiency and is taking a supplement. The neurologist diagnosed him with a benign essential tremor and was advised that there was no current concern about parkinson's. ROS:  History obtained from the patient  · Respiratory: no cough, shortness of breath, or wheezing  · Cardiovascular: no chest pain, palpitations, or dyspnea on exertion  · Gastrointestinal: no abdominal pain, N/V, has occasional pain over umbilicus     OBJECTIVE  Blood pressure 118/80, pulse 65, temperature 98.9 °F (37.2 °C), temperature source Oral, resp. rate 18, height 6' 2\" (1.88 m), weight 236 lb 6 oz (107.2 kg), SpO2 97 %. General:  alert, cooperative, well appearing, in no apparent distress. CV:  The heart sounds are regular in rate and rhythm. There is a normal S1 and S2. There or no murmurs. Lungs: Inspiratory and expiratory efforts are full and unlabored. Lung sounds are clear and equal to auscultation throughout all lung fields without wheezing, rales, or rhonchi. Abd: soft, nontender. Reducible superior umbilical hernia. Psych: normal affect. Mood good. Oriented x 3. Judgement and insight intact.      Results for orders placed or performed during the hospital encounter of 09/13/19   CBC W/O DIFF   Result Value Ref Range    WBC 4.6 4.6 - 13.2 K/uL    RBC 4.98 4.70 - 5.50 M/uL    HGB 15.3 13.0 - 16.0 g/dL    HCT 45.9 36.0 - 48.0 %    MCV 92.2 74.0 - 97.0 FL    MCH 30.7 24.0 - 34.0 PG MCHC 33.3 31.0 - 37.0 g/dL    RDW 13.9 11.6 - 14.5 %    PLATELET 314 528 - 666 K/uL    MPV 10.0 9.2 - 21.9 FL   METABOLIC PANEL, COMPREHENSIVE   Result Value Ref Range    Sodium 140 136 - 145 mmol/L    Potassium 4.7 3.5 - 5.5 mmol/L    Chloride 107 100 - 111 mmol/L    CO2 30 21 - 32 mmol/L    Anion gap 3 3.0 - 18 mmol/L    Glucose 102 (H) 74 - 99 mg/dL    BUN 16 7.0 - 18 MG/DL    Creatinine 1.00 0.6 - 1.3 MG/DL    BUN/Creatinine ratio 16 12 - 20      GFR est AA >60 >60 ml/min/1.73m2    GFR est non-AA >60 >60 ml/min/1.73m2    Calcium 9.1 8.5 - 10.1 MG/DL    Bilirubin, total 0.6 0.2 - 1.0 MG/DL    ALT (SGPT) 50 16 - 61 U/L    AST (SGOT) 36 10 - 38 U/L    Alk. phosphatase 53 45 - 117 U/L    Protein, total 7.0 6.4 - 8.2 g/dL    Albumin 3.9 3.4 - 5.0 g/dL    Globulin 3.1 2.0 - 4.0 g/dL    A-G Ratio 1.3 0.8 - 1.7     LIPID PANEL   Result Value Ref Range    LIPID PROFILE          Cholesterol, total 244 (H) <200 MG/DL    Triglyceride 90 <150 MG/DL    HDL Cholesterol 84 (H) 40 - 60 MG/DL    LDL, calculated 142 (H) 0 - 100 MG/DL    VLDL, calculated 18 MG/DL    CHOL/HDL Ratio 2.9 0 - 5.0     HEMOGLOBIN A1C W/O EAG   Result Value Ref Range    Hemoglobin A1c 5.5 4.2 - 5.6 %   PSA SCREENING (SCREENING)   Result Value Ref Range    Prostate Specific Ag 1.1 0.0 - 4.0 ng/mL   VITAMIN B12   Result Value Ref Range    Vitamin B12 552 211 - 911 pg/mL     ASSESSMENT / PLAN    ICD-10-CM ICD-9-CM    1. IFG (impaired fasting glucose)  R73.01 790.21 HEMOGLOBIN A1C WITH EAG   2. Prediabetes  R73.03 790.29    3. Hypercholesterolemia  E78.00 272.0 CBC WITH AUTOMATED DIFF      METABOLIC PANEL, COMPREHENSIVE      LIPID PANEL   4. PAC (premature atrial contraction)  I49.1 427.61    5. RBBB  I45.10 426.4    6. Aortic valve insufficiency, etiology of cardiac valve disease unspecified  I35.1 424.1    7. Benign essential tremor  G25.0 333.1    8. Vitamin B12 deficiency  E53.8 266.2 VITAMIN B12   9.  Erectile dysfunction, unspecified erectile dysfunction type  N52.9 607.84    10. Umbilical hernia without obstruction and without gangrene  K42.9 553.1    11. Prostate cancer screening  Z12.5 V76.44 PSA SCREENING (SCREENING)   12. Encounter for immunization  Z23 V03.89 ADMIN PNEUMOCOCCAL VACCINE      PNEUMOCOCCAL CONJ VACCINE 13 VALENT IM     Prediabetes / IFG - Cont to monitor A1c. Continue healthy living habits. PAC / RBBB/ aortic valve insufficiency - cont per cardiology. Notes reviewed. Needs to get in to see them. Hypercholesterolemia - Cont per cardiologist with Crestor 5mg nightly. LFTs and lipids ordered. No hepatotoxicity in the past.  Cont to monitor. Benign essential tremor - cont per neurology. We can prescribe his Inderal if needed. Vit B12 def - recommended vit B12 1,000mcg SL daily. Recheck levels. ED - refills of Cialis as needed. Umbilical hernia -offered referral for surgical fix but he is apprehensive now due to the pandemic. PSV13 administered. All chart history elements were reviewed by me at the time of the visit even though marked at time of note closure. Patient understands our medical plan. Patient has provided input and agrees with goals. Alternatives have been explained and offered. All questions answered. The patient is to call if condition worsens or fails to improve.      Follow-up and Dispositions    · Return in about 1 year (around 11/24/2021) for routine care and Medicare Wellness exam.

## 2020-11-25 VITALS
RESPIRATION RATE: 18 BRPM | WEIGHT: 236.38 LBS | HEART RATE: 65 BPM | HEIGHT: 76 IN | SYSTOLIC BLOOD PRESSURE: 118 MMHG | DIASTOLIC BLOOD PRESSURE: 80 MMHG | TEMPERATURE: 98.9 F | BODY MASS INDEX: 28.78 KG/M2 | OXYGEN SATURATION: 97 %

## 2021-02-22 ENCOUNTER — HOSPITAL ENCOUNTER (OUTPATIENT)
Dept: LAB | Age: 66
Discharge: HOME | End: 2021-02-22
Payer: MEDICARE

## 2021-02-22 DIAGNOSIS — R73.01 IFG (IMPAIRED FASTING GLUCOSE): ICD-10-CM

## 2021-02-22 DIAGNOSIS — Z12.5 PROSTATE CANCER SCREENING: ICD-10-CM

## 2021-02-22 DIAGNOSIS — E78.00 HYPERCHOLESTEROLEMIA: ICD-10-CM

## 2021-02-22 DIAGNOSIS — E53.8 VITAMIN B12 DEFICIENCY: ICD-10-CM

## 2021-02-22 LAB
ALBUMIN SERPL-MCNC: 4.1 G/DL (ref 3.4–5)
ALBUMIN/GLOB SERPL: 1.4 {RATIO} (ref 0.8–1.7)
ALP SERPL-CCNC: 56 U/L (ref 45–117)
ALT SERPL-CCNC: 51 U/L (ref 16–61)
ANION GAP SERPL CALC-SCNC: 1 MMOL/L (ref 3–18)
AST SERPL-CCNC: 34 U/L (ref 10–38)
BASOPHILS # BLD: 0 K/UL (ref 0–0.1)
BASOPHILS NFR BLD: 1 % (ref 0–2)
BILIRUB SERPL-MCNC: 0.5 MG/DL (ref 0.2–1)
BUN SERPL-MCNC: 14 MG/DL (ref 7–18)
BUN/CREAT SERPL: 14 (ref 12–20)
CALCIUM SERPL-MCNC: 9 MG/DL (ref 8.5–10.1)
CHLORIDE SERPL-SCNC: 107 MMOL/L (ref 100–111)
CHOLEST SERPL-MCNC: 228 MG/DL
CO2 SERPL-SCNC: 33 MMOL/L (ref 21–32)
CREAT SERPL-MCNC: 1 MG/DL (ref 0.6–1.3)
DIFFERENTIAL METHOD BLD: ABNORMAL
EOSINOPHIL # BLD: 0.3 K/UL (ref 0–0.4)
EOSINOPHIL NFR BLD: 6 % (ref 0–5)
ERYTHROCYTE [DISTWIDTH] IN BLOOD BY AUTOMATED COUNT: 14.2 % (ref 11.6–14.5)
EST. AVERAGE GLUCOSE BLD GHB EST-MCNC: 117 MG/DL
GLOBULIN SER CALC-MCNC: 3 G/DL (ref 2–4)
GLUCOSE SERPL-MCNC: 103 MG/DL (ref 74–99)
HBA1C MFR BLD: 5.7 % (ref 4.2–5.6)
HCT VFR BLD AUTO: 45.8 % (ref 36–48)
HDLC SERPL-MCNC: 70 MG/DL (ref 40–60)
HDLC SERPL: 3.3 {RATIO} (ref 0–5)
HGB BLD-MCNC: 15.1 G/DL (ref 13–16)
LDLC SERPL CALC-MCNC: 136 MG/DL (ref 0–100)
LIPID PROFILE,FLP: ABNORMAL
LYMPHOCYTES # BLD: 1.2 K/UL (ref 0.9–3.6)
LYMPHOCYTES NFR BLD: 21 % (ref 21–52)
MCH RBC QN AUTO: 30.2 PG (ref 24–34)
MCHC RBC AUTO-ENTMCNC: 33 G/DL (ref 31–37)
MCV RBC AUTO: 91.6 FL (ref 74–97)
MONOCYTES # BLD: 0.5 K/UL (ref 0.05–1.2)
MONOCYTES NFR BLD: 9 % (ref 3–10)
NEUTS SEG # BLD: 3.7 K/UL (ref 1.8–8)
NEUTS SEG NFR BLD: 63 % (ref 40–73)
PLATELET # BLD AUTO: 200 K/UL (ref 135–420)
PMV BLD AUTO: 10 FL (ref 9.2–11.8)
POTASSIUM SERPL-SCNC: 4.7 MMOL/L (ref 3.5–5.5)
PROT SERPL-MCNC: 7.1 G/DL (ref 6.4–8.2)
PSA SERPL-MCNC: 1.4 NG/ML (ref 0–4)
RBC # BLD AUTO: 5 M/UL (ref 4.7–5.5)
SODIUM SERPL-SCNC: 141 MMOL/L (ref 136–145)
TRIGL SERPL-MCNC: 110 MG/DL (ref ?–150)
VIT B12 SERPL-MCNC: 1806 PG/ML (ref 211–911)
VLDLC SERPL CALC-MCNC: 22 MG/DL
WBC # BLD AUTO: 5.8 K/UL (ref 4.6–13.2)

## 2021-02-22 PROCEDURE — 36415 COLL VENOUS BLD VENIPUNCTURE: CPT

## 2021-02-22 PROCEDURE — 84153 ASSAY OF PSA TOTAL: CPT

## 2021-02-22 PROCEDURE — 85025 COMPLETE CBC W/AUTO DIFF WBC: CPT

## 2021-02-22 PROCEDURE — 80061 LIPID PANEL: CPT

## 2021-02-22 PROCEDURE — 83036 HEMOGLOBIN GLYCOSYLATED A1C: CPT

## 2021-02-22 PROCEDURE — 80053 COMPREHEN METABOLIC PANEL: CPT

## 2021-02-22 PROCEDURE — 82607 VITAMIN B-12: CPT

## 2021-04-01 ENCOUNTER — OFFICE VISIT (OUTPATIENT)
Dept: CARDIOLOGY CLINIC | Age: 66
End: 2021-04-01
Payer: MEDICARE

## 2021-04-01 VITALS
WEIGHT: 241 LBS | DIASTOLIC BLOOD PRESSURE: 60 MMHG | OXYGEN SATURATION: 98 % | BODY MASS INDEX: 29.35 KG/M2 | HEART RATE: 87 BPM | SYSTOLIC BLOOD PRESSURE: 142 MMHG | HEIGHT: 76 IN

## 2021-04-01 DIAGNOSIS — I45.10 RBBB: Primary | ICD-10-CM

## 2021-04-01 DIAGNOSIS — E78.00 HYPERCHOLESTEROLEMIA: ICD-10-CM

## 2021-04-01 DIAGNOSIS — G25.0 BENIGN ESSENTIAL TREMOR: ICD-10-CM

## 2021-04-01 DIAGNOSIS — I49.1 PAC (PREMATURE ATRIAL CONTRACTION): ICD-10-CM

## 2021-04-01 PROCEDURE — G8432 DEP SCR NOT DOC, RNG: HCPCS | Performed by: INTERNAL MEDICINE

## 2021-04-01 PROCEDURE — G8427 DOCREV CUR MEDS BY ELIG CLIN: HCPCS | Performed by: INTERNAL MEDICINE

## 2021-04-01 PROCEDURE — 99214 OFFICE O/P EST MOD 30 MIN: CPT | Performed by: INTERNAL MEDICINE

## 2021-04-01 PROCEDURE — G8417 CALC BMI ABV UP PARAM F/U: HCPCS | Performed by: INTERNAL MEDICINE

## 2021-04-01 PROCEDURE — 1101F PT FALLS ASSESS-DOCD LE1/YR: CPT | Performed by: INTERNAL MEDICINE

## 2021-04-01 PROCEDURE — G8536 NO DOC ELDER MAL SCRN: HCPCS | Performed by: INTERNAL MEDICINE

## 2021-04-01 PROCEDURE — 93000 ELECTROCARDIOGRAM COMPLETE: CPT | Performed by: INTERNAL MEDICINE

## 2021-04-01 PROCEDURE — 3017F COLORECTAL CA SCREEN DOC REV: CPT | Performed by: INTERNAL MEDICINE

## 2021-04-01 NOTE — PROGRESS NOTES
HISTORY OF PRESENT ILLNESS  Parag Alcala is a 77 y.o. male. Follow-up  Pertinent negatives include no chest pain, no abdominal pain, no headaches and no shortness of breath. Cholesterol Problem  Pertinent negatives include no chest pain, no abdominal pain, no headaches and no shortness of breath. Patient presents for an overdue follow-up office visit. He has a past medical history significant for dyslipidemia and atrial premature contractions. He was discovered to have a small abdominal aortic aneurysm measuring 2.5 cm in diameter. He also discovered that he has a family history for premature coronary disease. He underwent a regular exercise treadmill stress test in August 2014 which was negative at 6 minutes total exercise time. He underwent an echocardiogram in March 2016 which showed preserved LV systolic function, EF 48-51% with grade 1 diastolic dysfunction and mild aortic valve insufficiency. He was started on Crestor 5 mg daily. He then had a follow-up lipid panel in August 2017 which showed a significant improvement in his total cholesterol and his LDL. His LDL had decreased from 186 down to 118. The patient was last seen in the office approximately 2 years ago. He missed last years visit due to the COVID-19 pandemic. He returns today for preoperative cardiac evaluation prior to undergoing a left total knee replacement which is scheduled for later this month. He denies any significant change in his activity tolerance. He has not experienced any exertional chest pain. He recently moved his son into an apartment where he had to admits to getting some more winded  move furniture up 3 stairs. At the top of the stairs but was able to complete the move without much difficulty. He states as long as he rested for a few minutes his symptoms would resolve. He denies any orthopnea, PND or leg swelling. No heart palpitations, dizziness, nor syncope.     Past Medical History: Diagnosis Date    Anxiety     Cardiac echocardiogram 03/04/2016    EF 55-60%. No RWMA. Gr 1 DDfx. Mild AI. No significant valvular heart disease.  Cardiac exercise stress test 08/01/2014    Neg EKG on maximal treadmill stress test.  One 6-beat episode of NSVT. Ex time 6 min.  Cardiovascular aorto-iliac duplex 03/12/2015    No AAA. Patent bilateral CIAs. Patent renal arteries.  Hearing loss     left ear    Hemorrhoids     Hypercholesterolemia     has not tolerated some statins in the past    Inguinal hernia, right     Lumbar herniated disc     L4-5, Dr. Chris Bagley PAC (premature atrial contraction)     Prediabetes     S/P colonoscopy 1/29/07    Dr. Yahir Jalloh, no polyps    S/P colonoscopy 04/05/2017    Moderate diverticulosis of the sigmoid colon / repeat in 10 years per report      Current Outpatient Medications   Medication Sig Dispense Refill    propranolol LA (INDERAL LA) 80 mg SR capsule TAKE 1 CAPSULE BY MOUTH ONCE DAILY 30 Cap 0    rosuvastatin (CRESTOR) 5 mg tablet TAKE 1 TABLET NIGHTLY 90 Tab 3    tadalafil (CIALIS) 2.5 mg tablet Take 1 Tab by mouth daily. 90 Tab 3    cyanocobalamin (VITAMIN B-12) 1,000 mcg sublingual tablet Take 1 Tab by mouth daily. 90 Tab 3    naproxen (NAPROSYN) 250 mg tablet Take 250 mg by mouth every eight (8) hours as needed.  JUBLIA connie topical solution Apply  to affected area daily.  venlafaxine-SR (EFFEXOR XR) 150 mg capsule Take 150 mg by mouth daily. Indications: ANXIETY WITH DEPRESSION         Allergies   Allergen Reactions    Simvastatin Myalgia        Social History     Tobacco Use    Smoking status: Never Smoker    Smokeless tobacco: Never Used   Substance Use Topics    Alcohol use: Yes     Alcohol/week: 0.0 standard drinks    Drug use: No          Review of Systems   Constitutional: Negative for chills, fever and weight loss. HENT: Negative for nosebleeds. Eyes: Negative for blurred vision and double vision. Respiratory: Negative for cough, shortness of breath and wheezing. Cardiovascular: Negative for chest pain, palpitations, orthopnea, claudication, leg swelling and PND. Gastrointestinal: Negative for abdominal pain, heartburn, nausea and vomiting. Genitourinary: Negative for dysuria and hematuria. Musculoskeletal: Negative for falls, joint pain and myalgias. Skin: Negative for rash. Neurological: Negative for dizziness, focal weakness and headaches. Endo/Heme/Allergies: Does not bruise/bleed easily. Psychiatric/Behavioral: Negative for substance abuse. Visit Vitals  BP (!) 142/60 (BP 1 Location: Left upper arm, BP Patient Position: Sitting, BP Cuff Size: Adult)   Pulse 87   Ht 6' 3.5\" (1.918 m)   Wt 109.3 kg (241 lb)   SpO2 98%   BMI 29.73 kg/m²      Physical Exam   Constitutional: He is oriented to person, place, and time. He appears well-developed and well-nourished. HENT:   Head: Normocephalic and atraumatic. Eyes: Conjunctivae are normal.   Neck: Neck supple. No JVD present. Carotid bruit is not present. Cardiovascular: Normal rate, regular rhythm, S1 normal, S2 normal and normal pulses. Frequent extrasystoles are present. Exam reveals no gallop and no S3. No murmur heard. Pulmonary/Chest: Breath sounds normal. He has no wheezes. He has no rales. Abdominal: Soft. Bowel sounds are normal. There is no abdominal tenderness. Musculoskeletal:         General: No edema. Neurological: He is alert and oriented to person, place, and time. Skin: Skin is warm and dry. EKG: Normal sinus rhythm, left axis deviation, right bundle branch block, frequent atrial premature contractions, occasional PVCs, no ST or T wave abnormalities cncerning for ischemia. Compared to his previous EKG, PVCs are now present. ASSESSMENT and PLAN    Low risk from a cardiac standpoint to proceed with orthopedic surgery as scheduled later this month. No further cardiac testing needed.   I would continue his propanolol LA perioperatively. Right bundle branch block. This is unchanged compared to his previous EKGs. Atrial premature contractions. Patient is asymptomatic to the ectopy. He is on a beta-blocker which I would continue. Dyslipidemia. Patient is tolerating low-dose Crestor at 5 mg daily. His most recent lipid panel from February 2021 demonstrated total cholesterol 228, HDL 70, . This is followed by his PCP. Essential tremor. Patient was started on a nonselective beta-blocker for this by his neurologist.  He did try stopping the medication but states his tremor got worse. Followup in 12 months, sooner if needed.

## 2021-04-01 NOTE — PROGRESS NOTES
Alexey Man presents today for   Chief Complaint   Patient presents with    Surgical Clearance     having left knee replacement with Dr. Joanna Davis on 4/21/21 at [facility] - no cardiac complaints        Alexey Man preferred language for health care discussion is english/other. Is someone accompanying this pt? no    Is the patient using any DME equipment during [address]? no    Depression Screening:  3 most recent PHQ Screens 11/24/2020   Little interest or pleasure in doing things Not at all   Feeling down, depressed, irritable, or hopeless Not at all   Total Score PHQ 2 0       Learning Assessment:  Learning Assessment 5/10/2018   PRIMARY LEARNER Patient   HIGHEST LEVEL OF EDUCATION - PRIMARY LEARNER  SOME COLLEGE   BARRIERS PRIMARY LEARNER NONE     NONE   CO-LEARNER CAREGIVER -   PRIMARY LANGUAGE ENGLISH   LEARNER PREFERENCE PRIMARY DEMONSTRATION     LISTENING     READING     OTHER (COMMENT)   ANSWERED BY Leland Mercedes     -   RELATIONSHIP SELF       Abuse Screening:  Abuse Screening Questionnaire 11/24/2020   Do you ever feel afraid of your partner? N   Are you in a relationship with someone who physically or mentally threatens you? N   Is it safe for you to go home? Y       Fall Risk  Fall Risk Assessment, last 12 mths 11/24/2020   Able to walk? Yes   Fall in past 12 months? No       Pt currently taking Anticoagulant therapy? no    Coordination of Care:  1. Have you been to the ER, urgent care clinic since your last visit? Hospitalized since your last visit? no    2. Have you seen or consulted any other health care providers outside of the [address] [place] [address] since your last visit? Include any pap smears or colon screening.  no

## 2021-08-10 RX ORDER — ROSUVASTATIN CALCIUM 5 MG/1
TABLET, COATED ORAL
Qty: 90 TABLET | Refills: 3 | Status: SHIPPED | OUTPATIENT
Start: 2021-08-10 | End: 2022-08-03

## 2021-10-18 ENCOUNTER — HOSPITAL ENCOUNTER (OUTPATIENT)
Dept: LAB | Age: 66
Discharge: HOME | End: 2021-10-18

## 2021-10-18 LAB — XX-LABCORP SPECIMEN COL,LCBCF: NORMAL

## 2021-10-18 PROCEDURE — 99001 SPECIMEN HANDLING PT-LAB: CPT

## 2021-11-29 ENCOUNTER — OFFICE VISIT (OUTPATIENT)
Dept: FAMILY MEDICINE CLINIC | Age: 66
End: 2021-11-29

## 2021-11-29 ENCOUNTER — OFFICE VISIT (OUTPATIENT)
Dept: FAMILY MEDICINE CLINIC | Age: 66
End: 2021-11-29
Payer: MEDICARE

## 2021-11-29 VITALS
HEART RATE: 76 BPM | BODY MASS INDEX: 28.98 KG/M2 | WEIGHT: 238 LBS | SYSTOLIC BLOOD PRESSURE: 130 MMHG | DIASTOLIC BLOOD PRESSURE: 76 MMHG | HEIGHT: 76 IN | RESPIRATION RATE: 14 BRPM | OXYGEN SATURATION: 98 % | TEMPERATURE: 97.4 F

## 2021-11-29 VITALS
OXYGEN SATURATION: 98 % | SYSTOLIC BLOOD PRESSURE: 130 MMHG | DIASTOLIC BLOOD PRESSURE: 76 MMHG | BODY MASS INDEX: 28.98 KG/M2 | HEIGHT: 76 IN | RESPIRATION RATE: 14 BRPM | WEIGHT: 238 LBS | HEART RATE: 76 BPM | TEMPERATURE: 97.4 F

## 2021-11-29 DIAGNOSIS — I49.1 PAC (PREMATURE ATRIAL CONTRACTION): ICD-10-CM

## 2021-11-29 DIAGNOSIS — E53.8 VITAMIN B12 DEFICIENCY: ICD-10-CM

## 2021-11-29 DIAGNOSIS — E78.00 HYPERCHOLESTEROLEMIA: ICD-10-CM

## 2021-11-29 DIAGNOSIS — G25.0 BENIGN ESSENTIAL TREMOR: ICD-10-CM

## 2021-11-29 DIAGNOSIS — R73.03 PREDIABETES: ICD-10-CM

## 2021-11-29 DIAGNOSIS — N52.9 ERECTILE DYSFUNCTION, UNSPECIFIED ERECTILE DYSFUNCTION TYPE: ICD-10-CM

## 2021-11-29 DIAGNOSIS — R73.01 IFG (IMPAIRED FASTING GLUCOSE): Primary | ICD-10-CM

## 2021-11-29 DIAGNOSIS — I45.10 RBBB: ICD-10-CM

## 2021-11-29 DIAGNOSIS — I35.1 AORTIC VALVE INSUFFICIENCY, ETIOLOGY OF CARDIAC VALVE DISEASE UNSPECIFIED: ICD-10-CM

## 2021-11-29 DIAGNOSIS — Z12.5 PROSTATE CANCER SCREENING: ICD-10-CM

## 2021-11-29 DIAGNOSIS — Z23 ENCOUNTER FOR IMMUNIZATION: ICD-10-CM

## 2021-11-29 DIAGNOSIS — Z00.00 INITIAL MEDICARE ANNUAL WELLNESS VISIT: Primary | ICD-10-CM

## 2021-11-29 PROCEDURE — G0438 PPPS, INITIAL VISIT: HCPCS | Performed by: FAMILY MEDICINE

## 2021-11-29 PROCEDURE — 1101F PT FALLS ASSESS-DOCD LE1/YR: CPT | Performed by: FAMILY MEDICINE

## 2021-11-29 PROCEDURE — 99214 OFFICE O/P EST MOD 30 MIN: CPT | Performed by: FAMILY MEDICINE

## 2021-11-29 PROCEDURE — G8419 CALC BMI OUT NRM PARAM NOF/U: HCPCS | Performed by: FAMILY MEDICINE

## 2021-11-29 PROCEDURE — G0009 ADMIN PNEUMOCOCCAL VACCINE: HCPCS | Performed by: FAMILY MEDICINE

## 2021-11-29 PROCEDURE — G8536 NO DOC ELDER MAL SCRN: HCPCS | Performed by: FAMILY MEDICINE

## 2021-11-29 PROCEDURE — G8427 DOCREV CUR MEDS BY ELIG CLIN: HCPCS | Performed by: FAMILY MEDICINE

## 2021-11-29 PROCEDURE — G8510 SCR DEP NEG, NO PLAN REQD: HCPCS | Performed by: FAMILY MEDICINE

## 2021-11-29 PROCEDURE — 3017F COLORECTAL CA SCREEN DOC REV: CPT | Performed by: FAMILY MEDICINE

## 2021-11-29 PROCEDURE — 90732 PPSV23 VACC 2 YRS+ SUBQ/IM: CPT | Performed by: FAMILY MEDICINE

## 2021-11-29 RX ORDER — TOPIRAMATE 50 MG/1
50 TABLET, FILM COATED ORAL 2 TIMES DAILY
COMMUNITY

## 2021-11-29 NOTE — PROGRESS NOTES
Chief Complaint   Patient presents with   [facility] Annual Wellness Visit     3 most recent PHQ Screens 11/29/2021   Little interest or pleasure in doing things Not at all   Feeling down, depressed, irritable, or hopeless Not at all   Total Score PHQ 2 0     Abuse Screening Questionnaire 11/29/2021   Do you ever feel afraid of your partner? N   Are you in a relationship with someone who physically or mentally threatens you? N   Is it safe for you to go home? Y     Fall Risk Assessment, last 12 mths 11/29/2021   Able to walk? -   Fall in past 12 months? 0   Do you feel unsteady? 0   Are you worried about falling 1   Is TUG test greater than 12 seconds? 0   Is the gait abnormal? 0       1. \"Have you been to the ER, urgent care clinic since your last visit? Hospitalized since your last visit? \" No    2. \"Have you seen or consulted any other health care providers outside of the [address] [place] [address] since your last visit? \" No     3. For patients aged 39-70: Has the patient had a colonoscopy / FIT/ Cologuard? Yes, HM satisfied with blue hyperlink     If the patient is female:    4. For patients aged 41-77: Has the patient had a mammogram within the past 2 years? NA based on age or sex    11. For patients aged 21-65: Has the patient had a pap smear?  NA based on age or sex

## 2021-11-29 NOTE — PROGRESS NOTES
Chief Complaint   Patient presents with    Annual Wellness Visit     This is an Initial Medicare Annual Wellness Exam (AWV) (Performed 12 months after IPPE or effective date of Medicare Part B enrollment, Once in a lifetime)    I have reviewed the patient's medical history in detail and updated the computerized patient record. Assessment/Plan   Education and counseling provided:  Are appropriate based on today's review and evaluation    1. Initial Medicare annual wellness visit     Age and sex specific counseling. Screening for depression and alcoholism. Advanced directives / end of life planning reviewed in chart. HCDM is elected. No ACP on file. Care team updated. Medicare recommended screening and prevention test table is filled out, reviewed, and provided to patient. Screening and prevention is discussed. Patient understands our medical plan. Patient has provided input and agrees with goals. All questions answered. Depression Risk Factor Screening     3 most recent PHQ Screens 11/29/2021   Little interest or pleasure in doing things Not at all   Feeling down, depressed, irritable, or hopeless Not at all   Total Score PHQ 2 0       Alcohol Risk Screen    Do you average more than 1 drink per night or more than 7 drinks a week: yes    In the past three months have you have had more than 4 drinks containing alcohol on one occasion: No    Functional Ability and Level of Safety    Hearing: The patient wears hearing aids. Activities of Daily Living: The home contains: no safety equipment. Patient does total self care     Ambulation: with no difficulty      Fall Risk:  Fall Risk Assessment, last 12 mths 11/29/2021   Able to walk? -   Fall in past 12 months? 0   Do you feel unsteady? 0   Are you worried about falling 1   Is TUG test greater than 12 seconds?  0   Is the gait abnormal? 0      Abuse Screen:  Patient is not abused       Cognitive Screening    Has your family/caregiver stated any concerns about your memory: no    Health Maintenance Due     Health Maintenance Due   Topic Date Due    DTaP/Tdap/Td series (2 - Td or Tdap) 06/29/2021    Pneumococcal 65+ years (2 of 2 - PPSV23) 11/24/2021       Patient Care Team   Patient Care Team:  Gisela Funes MD as PCP - General (Family Medicine)  Gisela Funes MD as PCP - [address] [place] [address] Provider  Sahil Leyva MD (Neurosurgery)  Leonard Lee MD (Cardiology)  Dottie Billingsley MD (General Surgery)  Karena May MD (Dermatology)  Sue Sharp MD (Psychiatry)  Erika Davison (Physician Assistant)  Dereck Kwok MD (Gastroenterology)  Darien Jara MD (Orthopedic Surgery)  Seema Del Rio MD as Surgeon (Surgery)    History     Patient Active Problem List   Diagnosis Code    Hypercholesterolemia E78.00    PAC (premature atrial contraction) I49.1    Family history of abdominal aortic aneurysm (AAA) Z82.49    S/P rotator cuff repair Z98.890    RBBB I45.10    Aortic valve insufficiency I35.1    Prediabetes R73.03    Benign essential tremor G25.0    Ventral hernia K43.9    Primary osteoarthritis of right knee M17.11    Lumbar herniated disc M51.26     Past Medical History:   Diagnosis Date    Anxiety     Cardiac echocardiogram 03/04/2016    EF 55-60%. No RWMA. Gr 1 DDfx. Mild AI. No significant valvular heart disease.  Cardiac exercise stress test 08/01/2014    Neg EKG on maximal treadmill stress test.  One 6-beat episode of NSVT. Ex time 6 min.  Cardiovascular aorto-iliac duplex 03/12/2015    No AAA. Patent bilateral CIAs. Patent renal arteries.     Hearing loss     left ear    Hemorrhoids     Hypercholesterolemia     has not tolerated some statins in the past    Inguinal hernia, right     Lumbar herniated disc     L4-5, Dr. Davina Patel PAC (premature atrial contraction)     Prediabetes     S/P colonoscopy 1/29/07    Dr. Mel Jordan, no polyps    S/P colonoscopy 04/05/2017    Moderate diverticulosis of the sigmoid colon / repeat in 10 years per report      Past Surgical History:   Procedure Laterality Date    HX BACK SURGERY  7/21/2011    Dr. Olamide Solo  11/12    right inguinal    HX KNEE REPLACEMENT Right 09/07/2016    HX KNEE REPLACEMENT Left 04/21/2021    HX SHOULDER ARTHROSCOPY Left 07/20/2015    Dr. Sunny Celestin / Rotator Cuff Repair    HX TONSILLECTOMY  17yo     Current Outpatient Medications   Medication Sig Dispense Refill    topiramate (Topamax) 50 mg tablet Take 50 mg by mouth two (2) times a day.  rosuvastatin (CRESTOR) 5 mg tablet TAKE 1 TABLET NIGHTLY 90 Tablet 3    tadalafil (CIALIS) 2.5 mg tablet Take 1 Tab by mouth daily. (Patient not taking: Reported on 11/29/2021) 90 Tab 3    cyanocobalamin (VITAMIN B-12) 1,000 mcg sublingual tablet Take 1 Tab by mouth daily. 90 Tab 3    naproxen (NAPROSYN) 250 mg tablet Take 250 mg by mouth every eight (8) hours as needed.  JUBLIA connie topical solution Apply  to affected area daily.  venlafaxine-SR (EFFEXOR XR) 150 mg capsule Take 150 mg by mouth daily. Indications: ANXIETY WITH DEPRESSION       Allergies   Allergen Reactions    Simvastatin Myalgia       Family History   Problem Relation Age of Onset    Alcohol abuse Father     Cancer Father 79        lung    Coronary Art Dis Father         46s    Emphysema Father     Heart Failure Father     Other Father         AAA    Heart Attack Maternal Grandmother     Other Brother         AAA    Coronary Art Dis Brother 77     Social History     Tobacco Use    Smoking status: Never Smoker    Smokeless tobacco: Never Used   Substance Use Topics    Alcohol use:  Yes     Alcohol/week: 0.0 standard drinks       Nicolle Pichardo MD

## 2021-11-29 NOTE — PROGRESS NOTES
1. \"Have you been to the ER, urgent care clinic since your last visit? Hospitalized since your last visit? \" No    2. \"Have you seen or consulted any other health care providers outside of the [address] [place] [address] since your last visit? \" No     3. For patients aged 39-70: Has the patient had a colonoscopy / FIT/ Cologuard? Yes, HM satisfied with blue hyperlink     If the patient is female:    4. For patients aged 41-77: Has the patient had a mammogram within the past 2 years? NA based on age or sex    11. For patients aged 21-65: Has the patient had a pap smear? NA based on age or sex    Physician order obtained. Patient completed adult immunization consent form. Allergies, contraindications and recommendations reviewed with patient. Pneumovax 23 vaccine administered IM left deltoid. Patient tolerated well. Patient remained in office for 15 minutes after injection and no adverse reactions were noted.

## 2021-11-29 NOTE — PATIENT INSTRUCTIONS
A Healthy Lifestyle: Care Instructions  Your Care Instructions     A healthy lifestyle can help you feel good, stay at a healthy weight, and have plenty of energy for both work and play. A healthy lifestyle is something you can share with your whole family. A healthy lifestyle also can lower your risk for serious health problems, such as high blood pressure, heart disease, and diabetes. You can follow a few steps listed below to improve your health and the health of your family. Follow-up care is a key part of your treatment and safety. Be sure to make and go to all appointments, and call your doctor if you are having problems. It's also a good idea to know your test results and keep a list of the medicines you take. How can you care for yourself at home? · Do not eat too much sugar, fat, or fast foods. You can still have dessert and treats now and then. The goal is moderation. · Start small to improve your eating habits. Pay attention to portion sizes, drink less juice and soda pop, and eat more fruits and vegetables. ? Eat a healthy amount of food. A 3-ounce serving of meat, for example, is about the size of a deck of cards. Fill the rest of your plate with vegetables and whole grains. ? Limit the amount of soda and sports drinks you have every day. Drink more water when you are thirsty. ? Eat plenty of fruits and vegetables every day. Have an apple or some carrot sticks as an afternoon snack instead of a candy bar. Try to have fruits and/or vegetables at every meal.  · Make exercise part of your daily routine. You may want to start with simple activities, such as walking, bicycling, or slow swimming. Try to be active 30 to 60 minutes every day. You do not need to do all 30 to 60 minutes all at once. For example, you can exercise 3 times a day for 10 or 20 minutes.  Moderate exercise is safe for most people, but it is always a good idea to talk to your doctor before starting an exercise program.  · Keep moving. Laurie Schaefer the lawn, work in the garden, or [employer]. Take the stairs instead of the elevator at work. · If you smoke, quit. People who smoke have an increased risk for heart attack, stroke, cancer, and other lung illnesses. Quitting is hard, but there are ways to boost your chance of quitting tobacco for good. ? Use nicotine gum, patches, or lozenges. ? Ask your doctor about stop-smoking programs and medicines. ? Keep trying. In addition to reducing your risk of diseases in the future, you will notice some benefits soon after you stop using tobacco. If you have shortness of breath or asthma symptoms, they will likely get better within a few weeks after you quit. · Limit how much alcohol you drink. Moderate amounts of alcohol (up to 2 drinks a day for men, 1 drink a day for women) are okay. But drinking too much can lead to liver problems, high blood pressure, and other health problems. Family health  If you have a family, there are many things you can do together to improve your health. · Eat meals together as a family as often as possible. · Eat healthy foods. This includes fruits, vegetables, lean meats and dairy, and whole grains. · Include your family in your fitness plan. Most people think of activities such as jogging or tennis as the way to fitness, but there are many ways you and your family can be more active. Anything that makes you breathe hard and gets your heart pumping is exercise. Here are some tips:  ? Walk to do errands or to take your child to school or the bus.  ? Go for a family bike ride after dinner instead of watching TV. Where can you learn more? Go to http://www.gray.com/  Enter P890 in the search box to learn more about \"A Healthy Lifestyle: Care Instructions. \"  Current as of: June 16, 2021               Content Version: 13.0  © 4142-7968 Healthwise, Incorporated.    Care instructions adapted under license by [facility] (which disclaims liability or warranty for this information). If you have questions about a medical condition or this instruction, always ask your healthcare professional. [address] any warranty or liability for your use of this information. Pneumococcal Polysaccharide Vaccine: What You Need to Know  Why get vaccinated? Pneumococcal polysaccharide vaccine (PPSV23) can prevent pneumococcal disease. Pneumococcal disease refers to any illness caused by pneumococcal bacteria. These bacteria can cause many types of illnesses, including pneumonia, which is an infection of the lungs. Pneumococcal bacteria are one of the most common causes of pneumonia. Besides pneumonia, pneumococcal bacteria can also cause:  · Ear infections,  · Sinus infections  · Meningitis (infection of the tissue covering the brain and spinal cord)  · Bacteremia (bloodstream infection)  Anyone can get pneumococcal disease, but children under 3years of age, people with certain medical conditions, adults 72 years or older, and cigarette smokers are at the highest risk. Most pneumococcal infections are mild. However, some can result in long-term problems, such as brain damage or hearing loss. Meningitis, bacteremia, and pneumonia caused by pneumococcal disease can be fatal.  PPSV23  PPSV23 protects against 23 types of bacteria that cause pneumococcal disease. PPSV23 is recommended for:  · All adults 72 years or older,  · Anyone 2 years or older with certain medical conditions that can lead to an increased risk for pneumococcal disease. Most people need only one dose of PPSV23. A second dose of PPSV23, and another type of pneumococcal vaccine called PCV13, are recommended for certain high-risk groups. Your health care provider can give you more information. People 65 years or older should get a dose of PPSV23 even if they have already gotten one or more doses of the vaccine before they turned 65.   Talk with your health care provider  Tell your vaccine provider if the person getting the vaccine:  · Has had an allergic reaction after a previous dose of PPSV23, or has any severe, life-threatening allergies. In some cases, your health care provider may decide to postpone PPSV23 vaccination to a future visit. People with minor illnesses, such as a cold, may be vaccinated. People who are moderately or severely ill should usually wait until they recover before getting PPSV23. Your health care provider can give you more information. Risks of a vaccine reaction  · Redness or pain where the shot is given, feeling tired, fever, or muscle aches can happen after PPSV23. People sometimes faint after medical procedures, including vaccination. Tell your provider if you feel dizzy or have vision changes or ringing in the ears. As with any medicine, there is a very remote chance of a vaccine causing a severe allergic reaction, other serious injury, or death. What if there is a serious problem? An allergic reaction could occur after the vaccinated person leaves the clinic. If you see signs of a severe allergic reaction (hives, swelling of the face and throat, difficulty breathing, a fast heartbeat, dizziness, or weakness), call 9-1-1 and get the person to the nearest hospital.  For other signs that concern you, call your health care provider. Adverse reactions should be reported to the Vaccine Adverse Event Reporting System (VAERS). Your health care provider will usually file this report, or you can do it yourself. Visit the VAERS website at www.vaers. hhs.gov at www.vaers. hhs.gov or call 1-143.775.9628. VAERS is only for reporting reactions, and VAERS staff do not give medical advice. How can I learn more? · Ask your health care provider. · Call your local or state health department. · Contact the Centers for Disease Control and Prevention (CDC):  ? Call 0-248.871.9812 (1-800-CDC-INFO) or  ?  Visit CDC's website at www.cdc.gov/vaccines  Vaccine Information Statement  PPSV23 Vaccine  10/30/2019  Department of Health and Human Services  Centers for Disease Control and Prevention  Many Vaccine Information Statements are available in [place] and other languages. See www.immunize.org/vis. Hojas de información Sobre Vacunas están disponibles en español y en muchos otros idiomas. Visite MarcelaSckeo.si. Care instructions adapted under license by [employer] (which disclaims liability or warranty for this information). If you have questions about a medical condition or this instruction, always ask your healthcare professional. [place] [address] any warranty or liability for your use of this information.     [facility]  [address]  [place], [address]  Phone: [phone]

## 2021-11-29 NOTE — PATIENT INSTRUCTIONS
Medicare Wellness Visit, Male    The best way to live healthy is to have a lifestyle where you eat a well-balanced diet, exercise regularly, limit alcohol use, and quit all forms of tobacco/nicotine, if applicable. Regular preventive services are another way to keep healthy. Preventive services (vaccines, screening tests, monitoring & exams) can help personalize your care plan, which helps you manage your own care. Screening tests can find health problems at the earliest stages, when they are easiest to treat. [facility] follows the current, evidence-based guidelines published by the [facility] ([facility]) when recommending preventive services for our patients. Because we follow these guidelines, sometimes recommendations change over time as research supports it. (For example, a prostate screening blood test is no longer routinely recommended for men with no symptoms). Of course, you and your doctor may decide to screen more often for some diseases, based on your risk and co-morbidities (chronic disease you are already diagnosed with). Preventive services for you include:  - Medicare offers their members a free annual wellness visit, which is time for you and your primary care provider to discuss and plan for your preventive service needs. Take advantage of this benefit every year!  -All adults over age 72 should receive the recommended pneumonia vaccines. Current USPSTF guidelines recommend a series of two vaccines for the best pneumonia protection.   -All adults should have a flu vaccine yearly and tetanus vaccine every 10 years.  -All adults age 48 and older should receive the shingles vaccines (series of two vaccines).        -All adults age 38-68 who are overweight should have a diabetes screening test once every three years.   -Other screening tests & preventive services for persons with diabetes include: an eye exam to screen for diabetic retinopathy, a kidney function test, a foot exam, and stricter control over your cholesterol.   -Cardiovascular screening for adults with routine risk involves an electrocardiogram (ECG) at intervals determined by the provider.   -Colorectal cancer screening should be done for adults age 54-65 with no increased risk factors for colorectal cancer. There are a number of acceptable methods of screening for this type of cancer. Each test has its own benefits and drawbacks. Discuss with your provider what is most appropriate for you during your annual wellness visit. The different tests include: colonoscopy (considered the best screening method), a fecal occult blood test, a fecal DNA test, and sigmoidoscopy.  -All adults born between [facility] should be screened once for Hepatitis C.  -An Abdominal Aortic Aneurysm (AAA) Screening is recommended for men age 73-68 who has ever smoked in their lifetime.      Here is a list of your current Health Maintenance items (your personalized list of preventive services) with a due date:  Health Maintenance Due   Topic Date Due    DTaP/Tdap/Td  (2 - Td or Tdap) 06/29/2021    Pneumococcal Vaccine (2 of 2 - PPSV23) 11/24/2021

## 2021-11-30 NOTE — PROGRESS NOTES
SUBJECTIVE  Chief Complaint   Patient presents with    Follow-up    Other     prediabetes, hyperchol     Had his knee replaced and needs surgical history updated. It is doing well. He has had no relief with his tremor on increasing doses of labetalol. He has started Topamax as a trial recently. He sees neurology for that. He had labs showing a vit B deficiency and is taking a supplement. The neurologist diagnosed him with a benign essential tremor and was advised that there was no current concern about parkinson's. Patient presents for follow-up on their prediabetes and hypercholesterolemia. He is seeing cardiology for management of cardiac risk factors. He is on a low dose Crestor 5mg daily. ROS:  History obtained from the patient  · Respiratory: no cough, shortness of breath, or wheezing  · Cardiovascular: no chest pain, palpitations, or dyspnea on exertion  · Gastrointestinal: no abdominal pain, N/V, has occasional pain over umbilicus   · : ED - wonders about testosterone levels due to decreased libido. OBJECTIVE  Blood pressure 130/76, pulse 76, temperature 97.4 °F (36.3 °C), temperature source Temporal, resp. rate 14, height 6' 3.5\" (1.918 m), weight 238 lb (108 kg), SpO2 98 %. General:  alert, cooperative, well appearing, in no apparent distress. CV:  The heart sounds are regular in rate and rhythm. There is a normal S1 and S2. There or no murmurs. Lungs: Inspiratory and expiratory efforts are full and unlabored. Lung sounds are clear and equal to auscultation throughout all lung fields without wheezing, rales, or rhonchi. Psych: normal affect. Mood good. Oriented x 3. Judgement and insight intact. ASSESSMENT / PLAN    ICD-10-CM ICD-9-CM    1. IFG (impaired fasting glucose)  R73.01 790.21 HEMOGLOBIN A1C W/O EAG   2. Prediabetes  R73.03 790.29    3. Hypercholesterolemia  E78.00 272.0 LIPID PANEL   4. PAC (premature atrial contraction)  I49.1 427.61    5.  RBBB  I45.10 426.4    6. Aortic valve insufficiency, etiology of cardiac valve disease unspecified  I35.1 424.1    7. Benign essential tremor  G25.0 333.1 CBC WITH AUTOMATED DIFF      METABOLIC PANEL, COMPREHENSIVE   8. Vitamin B12 deficiency  E53.8 266.2 VITAMIN B12   9. Erectile dysfunction, unspecified erectile dysfunction type  N52.9 607.84 TESTOSTERONE, FREE & TOTAL   10. Prostate cancer screening  Z12.5 V76.44 PSA SCREENING (SCREENING)   11. Encounter for immunization  Z23 V03.89 ADMIN PNEUMOCOCCAL VACCINE      PNEUMOCOCCAL POLYSACCHARIDE VACCINE, 23-VALENT, ADULT OR IMMUNOSUPPRESSED PT DOSE,     Prediabetes / IFG - Cont to monitor A1c. Continue healthy living habits. PAC / RBBB/ aortic valve insufficiency - cont per cardiology. Notes reviewed. Hypercholesterolemia - Cont per cardiologist with Crestor 5mg nightly. LFTs and lipids ordered. No hepatotoxicity in the past.  Cont to monitor. Benign essential tremor - cont per neurology. Noted med change to topamax. Vit B12 def -cont vit B12 1,000mcg SL daily. Recheck levels. ED - refills of Cialis as needed. Adding testosterone levels per his request due to report of decreased libido. PPSV23 administered. All chart history elements were reviewed by me at the time of the visit even though marked at time of note closure. Patient understands our medical plan. Patient has provided input and agrees with goals. Alternatives have been explained and offered. All questions answered. The patient is to call if condition worsens or fails to improve. Follow-up and Dispositions    · Return in about 7 months (around 6/29/2022) for routine care.

## 2022-02-08 ENCOUNTER — OFFICE VISIT (OUTPATIENT)
Dept: SURGERY | Age: 67
End: 2022-02-08
Payer: MEDICARE

## 2022-02-08 VITALS
OXYGEN SATURATION: 98 % | HEART RATE: 75 BPM | TEMPERATURE: 98.2 F | RESPIRATION RATE: 15 BRPM | DIASTOLIC BLOOD PRESSURE: 78 MMHG | BODY MASS INDEX: 27.89 KG/M2 | WEIGHT: 229 LBS | HEIGHT: 76 IN | SYSTOLIC BLOOD PRESSURE: 122 MMHG

## 2022-02-08 DIAGNOSIS — K43.9 SUPRAUMBILICAL HERNIA WITHOUT GANGRENE AND WITHOUT OBSTRUCTION: ICD-10-CM

## 2022-02-08 DIAGNOSIS — K42.9 UMBILICAL HERNIA WITHOUT OBSTRUCTION AND WITHOUT GANGRENE: Primary | ICD-10-CM

## 2022-02-08 PROCEDURE — G8536 NO DOC ELDER MAL SCRN: HCPCS | Performed by: SURGERY

## 2022-02-08 PROCEDURE — 1101F PT FALLS ASSESS-DOCD LE1/YR: CPT | Performed by: SURGERY

## 2022-02-08 PROCEDURE — 99205 OFFICE O/P NEW HI 60 MIN: CPT | Performed by: SURGERY

## 2022-02-08 PROCEDURE — 3017F COLORECTAL CA SCREEN DOC REV: CPT | Performed by: SURGERY

## 2022-02-08 PROCEDURE — G8419 CALC BMI OUT NRM PARAM NOF/U: HCPCS | Performed by: SURGERY

## 2022-02-08 PROCEDURE — G8427 DOCREV CUR MEDS BY ELIG CLIN: HCPCS | Performed by: SURGERY

## 2022-02-08 PROCEDURE — G8432 DEP SCR NOT DOC, RNG: HCPCS | Performed by: SURGERY

## 2022-02-08 RX ORDER — SODIUM CHLORIDE 0.9 % (FLUSH) 0.9 %
5-40 SYRINGE (ML) INJECTION EVERY 8 HOURS
Status: CANCELLED | OUTPATIENT
Start: 2022-02-08

## 2022-02-08 RX ORDER — SODIUM CHLORIDE 0.9 % (FLUSH) 0.9 %
5-40 SYRINGE (ML) INJECTION AS NEEDED
Status: CANCELLED | OUTPATIENT
Start: 2022-02-08

## 2022-02-08 NOTE — PATIENT INSTRUCTIONS
Abdominal Hernia Repair: Before Your Surgery  What is abdominal hernia repair surgery? An abdominal hernia repair is a type of surgery. It fixes a problem called a hernia. A hernia is a bulge under the skin in your belly. It happens when you have a weak spot in your belly muscles and a piece of your intestines or tissues pokes through your muscles. This can cause pain. You may notice the pain most when you lift something heavy. You can have a hernia near your belly button. Or it may be in a scar from an earlier surgery. To fix it, the doctor will do one of two kinds of surgery. In open surgery, the doctor makes one cut near the hernia. This cut is called an incision. In laparoscopic surgery, the doctor makes several very small incisions and uses a thin, lighted scope and small tools. In either type of surgery, the doctor pushes the bulge back in place, if needed. Then the doctor sews the healthy tissue back together. Often the doctor patches the weak spot with a piece of material.  Laparoscopic surgery leaves several small scars. Open surgery leaves one long scar. The scars fade with time. You will probably need to take 1 to 2 weeks off from work. But if your job requires heavy lifting or other physical work, you may need to take 4 to 6 weeks off. Follow-up care is a key part of your treatment and safety. Be sure to make and go to all appointments, and call your doctor if you are having problems. It's also a good idea to know your test results and keep a list of the medicines you take. How do you prepare for the surgery? Surgery can be stressful. This information will help you understand what you can expect. And it will help you safely prepare for surgery. Preparing for surgery    · Be sure you have someone to take you home.  Anesthesia and pain medicine will make it unsafe for you to drive or get home on your own.     · Understand exactly what surgery is planned, along with the risks, benefits, and other options.     · Tell your doctor ALL the medicines, vitamins, supplements, and herbal remedies you take. Some may increase the risk of problems during your surgery. Your doctor will tell you if you should stop taking any of them before the surgery and how soon to do it.     · If you take aspirin or some other blood thinner, ask your doctor if you should stop taking it before your surgery. Make sure that you understand exactly what your doctor wants you to do. These medicines increase the risk of bleeding.     · Make sure your doctor and the hospital have a copy of your advance directive. If you don't have one, you may want to prepare one. It lets others know your health care wishes. It's a good thing to have before any type of surgery or procedure. .   What happens on the day of surgery? · Follow the instructions exactly about when to stop eating and drinking. If you don't, your surgery may be canceled. If your doctor told you to take your medicines on the day of surgery, take them with only a sip of water.     · Take a bath or shower before you come in for your surgery. Do not apply lotions, perfumes, deodorants, or nail polish.     · Do not shave the surgical site yourself.     · Take off all jewelry and piercings. And take out contact lenses, if you wear them. At the hospital or surgery center   · Bring a picture ID.     · The area for surgery is often marked to make sure there are no errors.     · You will be kept comfortable and safe by your anesthesia provider. You will be asleep during the surgery.     · The surgery will take 30 minutes to 2 hours. It depends on how large the hernia is and where it is. When should you call your doctor? · You have questions or concerns.     · You don't understand how to prepare for your surgery.     · You become ill before the surgery (such as fever, flu, or a cold).     · You need to reschedule or have changed your mind about having the surgery.    Where can you learn more?  Go to http://www.gray.com/  Enter U288 in the search box to learn more about \"Abdominal Hernia Repair: Before Your Surgery. \"  Current as of: February 10, 2021               Content Version: 13.0  © 3938-5417 Healthwise, Incorporated. Care instructions adapted under license by [facility] (which disclaims liability or warranty for this information). If you have questions about a medical condition or this instruction, always ask your healthcare professional. [place] [address] any warranty or liability for your use of this information.

## 2022-02-08 NOTE — PROGRESS NOTES
[facility]  General Surgery    Subjective:     CC: Supraumbilical and umbilical hernias     HPI: Patient is a very pleasant 49-year-old male with past medical history remarkable for hypercholesterolemia, right bundle branch block, aortic valve insufficiency, premature atrial contractions, benign essential tremors, prediabetes and primary osteoarthritis of the right knee. He is referred by PAUL Perry for evaluation and management of umbilical hernia. Patient states that he has had umbilical hernia for several years. He was seen and evaluated in the past.  He was told at that time that he did not need repair. Over the years it has become more painful. He manages the pain with ice. He would like to have it repaired. Patient Active Problem List    Diagnosis Date Noted    Ventral hernia 10/05/2018    Benign essential tremor 02/21/2018    Prediabetes 08/16/2017    Primary osteoarthritis of right knee 09/07/2016    RBBB 08/31/2016    Aortic valve insufficiency 08/31/2016    S/P rotator cuff repair 10/13/2015    Family history of abdominal aortic aneurysm (AAA) 02/12/2014    Lumbar herniated disc 07/21/2011    Hypercholesterolemia     PAC (premature atrial contraction)      Past Medical History:   Diagnosis Date    Anxiety     Cardiac echocardiogram 03/04/2016    EF 55-60%. No RWMA. Gr 1 DDfx. Mild AI. No significant valvular heart disease.  Cardiac exercise stress test 08/01/2014    Neg EKG on maximal treadmill stress test.  One 6-beat episode of NSVT. Ex time 6 min.  Cardiovascular aorto-iliac duplex 03/12/2015    No AAA. Patent bilateral CIAs. Patent renal arteries.     Hearing loss     left ear    Hemorrhoids     Hypercholesterolemia     has not tolerated some statins in the past    Inguinal hernia, right     Lumbar herniated disc     L4-5, Dr. Beryle Mayo PAC (premature atrial contraction)     Prediabetes     S/P colonoscopy 1/29/07     Tran, no polyps    S/P colonoscopy 04/05/2017    Moderate diverticulosis of the sigmoid colon / repeat in 10 years per report      Past Surgical History:   Procedure Laterality Date    HX BACK SURGERY  7/21/2011    Dr. Yola Barron  11/12    right inguinal    HX KNEE REPLACEMENT Right 09/07/2016    HX KNEE REPLACEMENT Left 04/21/2021    HX SHOULDER ARTHROSCOPY Left 07/20/2015    Dr. Corky Garcia / [facility]    HX TONSILLECTOMY  14yo      Family History   Problem Relation Age of Onset    Alcohol abuse Father     Cancer Father 79        lung    Coronary Art Dis Father         46s    Emphysema Father     Heart Failure Father     Other Father         AAA    Heart Attack Maternal Grandmother     Other Brother         AAA    Coronary Art Dis Brother 77      Social History     Tobacco Use    Smoking status: Never Smoker    Smokeless tobacco: Never Used   Substance Use Topics    Alcohol use: Yes     Alcohol/week: 0.0 standard drinks      Allergies   Allergen Reactions    Simvastatin Myalgia       Prior to Admission medications    Medication Sig Start Date End Date Taking? Authorizing Provider   topiramate (Topamax) 50 mg tablet Take 50 mg by mouth two (2) times a day. Yes Provider, Historical   rosuvastatin (CRESTOR) 5 mg tablet TAKE 1 TABLET NIGHTLY 8/10/21  Yes Jazmin Dorantes P, NP   cyanocobalamin (VITAMIN B-12) 1,000 mcg sublingual tablet Take 1 Tab by mouth daily. 3/19/19  Yes Lauren Celestin MD   naproxen (NAPROSYN) 250 mg tablet Take 250 mg by mouth every eight (8) hours as needed. Yes Provider, Historical   JUBLIA connie topical solution Apply  to affected area daily. 9/18/17  Yes Provider, Historical   venlafaxine-SR (EFFEXOR XR) 150 mg capsule Take 150 mg by mouth daily. Indications: ANXIETY WITH DEPRESSION   Yes Provider, Historical   tadalafil (CIALIS) 2.5 mg tablet Take 1 Tab by mouth daily.   Patient not taking: Reported on 11/29/2021 6/21/19   Romayne Ravel, Tito Galvan MD       Review of Systems:    14 systems were reviewed. The results are as above in the HPI and otherwise negative. Objective:     Vitals:    02/08/22 1103   BP: 122/78   Pulse: 75   Resp: 15   Temp: 98.2 °F (36.8 °C)   TempSrc: Temporal   SpO2: 98%   Weight: 103.9 kg (229 lb)   Height: 6' 3.5\" (1.918 m)       Physical Exam:  GENERAL: alert, cooperative, no distress, appears stated age,   EYE: conjunctivae/corneas clear. PERRL, EOM's intact. THROAT & NECK: normal and no erythema or exudates noted. ,    LYMPHATIC: Cervical, supraclavicular, and axillary nodes normal. ,   LUNG: clear to auscultation bilaterally,   HEART: regular rate and rhythm, S1, S2 normal, no murmur, click, rub or gallop,   ABDOMEN: soft, non-tender. Reducible umbilical and supraumbilical hernias. Bowel sounds normal. No masses,  no organomegaly,   EXTREMITIES:  extremities normal, atraumatic, no cyanosis or edema,   SKIN: Normal.,   NEUROLOGIC: AOx3. Cranial nerves 2-12 and sensation grossly intact. ,     Data Review:  to be done    Mr. Polo Terry has a reminder for a \"due or due soon\" health maintenance. I have asked that he contact his primary care provider for follow-up on this health maintenance. Impression:     · Patient with supraumbilical and umbilical hernia without obstruction.     Plan:     · Robot-assisted laparoscopic repair of umbilical and supraumbilical hernia with 10 x 15 cm ventral light ST mesh with echo positioning system, tap block  · Consent on chart  · Preoperative orders written    Signed By: Katarzyna Marx MD     February 8, 2022

## 2022-02-08 NOTE — H&P (VIEW-ONLY)
[facility]  General Surgery    Subjective:     CC: Supraumbilical and umbilical hernias     HPI: Patient is a very pleasant 59-year-old male with past medical history remarkable for hypercholesterolemia, right bundle branch block, aortic valve insufficiency, premature atrial contractions, benign essential tremors, prediabetes and primary osteoarthritis of the right knee. He is referred by PAUL Pearson for evaluation and management of umbilical hernia. Patient states that he has had umbilical hernia for several years. He was seen and evaluated in the past.  He was told at that time that he did not need repair. Over the years it has become more painful. He manages the pain with ice. He would like to have it repaired. Patient Active Problem List    Diagnosis Date Noted    Ventral hernia 10/05/2018    Benign essential tremor 02/21/2018    Prediabetes 08/16/2017    Primary osteoarthritis of right knee 09/07/2016    RBBB 08/31/2016    Aortic valve insufficiency 08/31/2016    S/P rotator cuff repair 10/13/2015    Family history of abdominal aortic aneurysm (AAA) 02/12/2014    Lumbar herniated disc 07/21/2011    Hypercholesterolemia     PAC (premature atrial contraction)      Past Medical History:   Diagnosis Date    Anxiety     Cardiac echocardiogram 03/04/2016    EF 55-60%. No RWMA. Gr 1 DDfx. Mild AI. No significant valvular heart disease.  Cardiac exercise stress test 08/01/2014    Neg EKG on maximal treadmill stress test.  One 6-beat episode of NSVT. Ex time 6 min.  Cardiovascular aorto-iliac duplex 03/12/2015    No AAA. Patent bilateral CIAs. Patent renal arteries.     Hearing loss     left ear    Hemorrhoids     Hypercholesterolemia     has not tolerated some statins in the past    Inguinal hernia, right     Lumbar herniated disc     L4-5, Dr. Virgen Lomeli PAC (premature atrial contraction)     Prediabetes     S/P colonoscopy 1/29/07     Tran, no polyps    S/P colonoscopy 04/05/2017    Moderate diverticulosis of the sigmoid colon / repeat in 10 years per report      Past Surgical History:   Procedure Laterality Date    HX BACK SURGERY  7/21/2011    Dr. Beena Kim  11/12    right inguinal    HX KNEE REPLACEMENT Right 09/07/2016    HX KNEE REPLACEMENT Left 04/21/2021    HX SHOULDER ARTHROSCOPY Left 07/20/2015    Dr. Francesco Alexandra / [facility] Repair    HX TONSILLECTOMY  14yo      Family History   Problem Relation Age of Onset    Alcohol abuse Father     Cancer Father 79        lung    Coronary Art Dis Father         46s    Emphysema Father     Heart Failure Father     Other Father         AAA    Heart Attack Maternal Grandmother     Other Brother         AAA    Coronary Art Dis Brother 77      Social History     Tobacco Use    Smoking status: Never Smoker    Smokeless tobacco: Never Used   Substance Use Topics    Alcohol use: Yes     Alcohol/week: 0.0 standard drinks      Allergies   Allergen Reactions    Simvastatin Myalgia       Prior to Admission medications    Medication Sig Start Date End Date Taking? Authorizing Provider   topiramate (Topamax) 50 mg tablet Take 50 mg by mouth two (2) times a day. Yes Provider, Historical   rosuvastatin (CRESTOR) 5 mg tablet TAKE 1 TABLET NIGHTLY 8/10/21  Yes Monserrat Dorantes P, NP   cyanocobalamin (VITAMIN B-12) 1,000 mcg sublingual tablet Take 1 Tab by mouth daily. 3/19/19  Yes Fidel Harmon MD   naproxen (NAPROSYN) 250 mg tablet Take 250 mg by mouth every eight (8) hours as needed. Yes Provider, Historical   JUBLIA connie topical solution Apply  to affected area daily. 9/18/17  Yes Provider, Historical   venlafaxine-SR (EFFEXOR XR) 150 mg capsule Take 150 mg by mouth daily. Indications: ANXIETY WITH DEPRESSION   Yes Provider, Historical   tadalafil (CIALIS) 2.5 mg tablet Take 1 Tab by mouth daily.   Patient not taking: Reported on 11/29/2021 6/21/19   Roel Washington Ana Pandya MD       Review of Systems:    14 systems were reviewed. The results are as above in the HPI and otherwise negative. Objective:     Vitals:    02/08/22 1103   BP: 122/78   Pulse: 75   Resp: 15   Temp: 98.2 °F (36.8 °C)   TempSrc: Temporal   SpO2: 98%   Weight: 103.9 kg (229 lb)   Height: 6' 3.5\" (1.918 m)       Physical Exam:  GENERAL: alert, cooperative, no distress, appears stated age,   EYE: conjunctivae/corneas clear. PERRL, EOM's intact. THROAT & NECK: normal and no erythema or exudates noted. ,    LYMPHATIC: Cervical, supraclavicular, and axillary nodes normal. ,   LUNG: clear to auscultation bilaterally,   HEART: regular rate and rhythm, S1, S2 normal, no murmur, click, rub or gallop,   ABDOMEN: soft, non-tender. Reducible umbilical and supraumbilical hernias. Bowel sounds normal. No masses,  no organomegaly,   EXTREMITIES:  extremities normal, atraumatic, no cyanosis or edema,   SKIN: Normal.,   NEUROLOGIC: AOx3. Cranial nerves 2-12 and sensation grossly intact. ,     Data Review:  to be done    Mr. Loco Montoya has a reminder for a \"due or due soon\" health maintenance. I have asked that he contact his primary care provider for follow-up on this health maintenance. Impression:     · Patient with supraumbilical and umbilical hernia without obstruction.     Plan:     · Robot-assisted laparoscopic repair of umbilical and supraumbilical hernia with 10 x 15 cm ventral light ST mesh with echo positioning system, tap block  · Consent on chart  · Preoperative orders written    Signed By: Duke Martin MD     February 8, 2022

## 2022-02-08 NOTE — PROGRESS NOTES
Review of Systems   Constitutional: Negative. HENT: Positive for hearing loss and tinnitus. Eyes: Negative. Respiratory: Negative. Cardiovascular: Negative. Gastrointestinal: Negative. Genitourinary: Negative. Musculoskeletal: Positive for joint pain. Skin: Negative. Neurological: Positive for tremors. Endo/Heme/Allergies: Negative. Psychiatric/Behavioral: Negative.

## 2022-02-09 NOTE — PERIOP NOTES
PRE-SURGICAL INSTRUCTIONS        Patient's Name:  Orly Martinez      Today's Date:  2/9/2022            Covid Testing Date and Time:  Monday 2/14 8:00 am    Surgery Date:  2/17/2022                1. Do NOT eat or drink anything, including candy, gum, or ice chips after midnight on 2/17, unless you have specific instructions from your surgeon or anesthesia provider to do so.  2. You may brush your teeth before coming to the hospital.  3. No smoking 24 hours prior to the day of surgery. 4. No alcohol 24 hours prior to the day of surgery. 5. No recreational drugs for one week prior to the day of surgery. 6. Leave all valuables, including money/purse, at home. 7. Remove all jewelry, nail polish, acrylic nails, and makeup (including mascara); no lotions powders, deodorant, or perfume/cologne/after shave on the skin. 8. Follow instruction for Hibiclens washes and CHG wipes from surgeon's office. 9. Glasses/contact lenses and dentures may be worn to the hospital.  They will be removed prior to surgery. 10. Call your doctor if symptoms of a cold or illness develop within 24-48 hours prior to your surgery. 11.  If you are having an outpatient procedure, please make arrangements for a responsible ADULT TO [address] [place] [address] and stay with you for 24 hours after your surgery. 12. ONE VISITOR in the hospital at this time for outpatient procedures. Exceptions may be made for surgical admissions, per nursing unit guidelines      Special Instructions:      Bring list of CURRENT medications. Bring any pertinent legal medical records. Take these medications the morning of surgery with a sip of water:  As directed by MD  Follow physician instructions about stopping anticoagulants. On the day of surgery, come in the main entrance of [facility]. Let the  at the desk know you are there for surgery.   A staff member will come escort you to the surgical area on the second floor.    If you have any questions or concerns, please do not hesitate to call:     (Prior to the day of surgery) PAT department:  [phone]   (Day of surgery) Pre-Op department:  [phone]    These surgical instructions were reviewed with Adi Payton during the [facility] phone call.

## 2022-02-14 ENCOUNTER — HOSPITAL ENCOUNTER (OUTPATIENT)
Dept: PREADMISSION TESTING | Age: 67
Discharge: HOME | End: 2022-02-14
Payer: MEDICARE

## 2022-02-14 ENCOUNTER — HOSPITAL ENCOUNTER (OUTPATIENT)
Dept: GENERAL RADIOLOGY | Age: 67
Discharge: HOME | End: 2022-02-14
Payer: MEDICARE

## 2022-02-14 ENCOUNTER — HOSPITAL ENCOUNTER (OUTPATIENT)
Dept: LAB | Age: 67
Discharge: HOME | End: 2022-02-14

## 2022-02-14 DIAGNOSIS — K43.9 SUPRAUMBILICAL HERNIA WITHOUT GANGRENE AND WITHOUT OBSTRUCTION: ICD-10-CM

## 2022-02-14 DIAGNOSIS — K42.9 UMBILICAL HERNIA WITHOUT OBSTRUCTION AND WITHOUT GANGRENE: ICD-10-CM

## 2022-02-14 LAB
ATRIAL RATE: 53 BPM
CALCULATED P AXIS, ECG09: 53 DEGREES
CALCULATED R AXIS, ECG10: -17 DEGREES
CALCULATED T AXIS, ECG11: 27 DEGREES
DIAGNOSIS, 93000: NORMAL
P-R INTERVAL, ECG05: 212 MS
Q-T INTERVAL, ECG07: 464 MS
QRS DURATION, ECG06: 144 MS
QTC CALCULATION (BEZET), ECG08: 435 MS
SARS-COV-2, NAA: NOT DETECTED
VENTRICULAR RATE, ECG03: 53 BPM
XX-LABCORP SPECIMEN COL,LCBCF: NORMAL

## 2022-02-14 PROCEDURE — 71046 X-RAY EXAM CHEST 2 VIEWS: CPT

## 2022-02-14 PROCEDURE — U0003 INFECTIOUS AGENT DETECTION BY NUCLEIC ACID (DNA OR RNA); SEVERE ACUTE RESPIRATORY SYNDROME CORONAVIRUS 2 (SARS-COV-2) (CORONAVIRUS DISEASE [COVID-19]), AMPLIFIED PROBE TECHNIQUE, MAKING USE OF HIGH THROUGHPUT TECHNOLOGIES AS DESCRIBED BY CMS-2020-01-R: HCPCS

## 2022-02-14 PROCEDURE — 99001 SPECIMEN HANDLING PT-LAB: CPT

## 2022-02-14 PROCEDURE — 93005 ELECTROCARDIOGRAM TRACING: CPT

## 2022-02-15 LAB
BASOPHILS # BLD AUTO: 0 X10E3/UL (ref 0–0.2)
BASOPHILS NFR BLD AUTO: 1 %
BUN SERPL-MCNC: 21 MG/DL (ref 8–27)
BUN/CREAT SERPL: 19 (ref 10–24)
CALCIUM SERPL-MCNC: 9 MG/DL (ref 8.6–10.2)
CHLORIDE SERPL-SCNC: 104 MMOL/L (ref 96–106)
CO2 SERPL-SCNC: 21 MMOL/L (ref 20–29)
CREAT SERPL-MCNC: 1.09 MG/DL (ref 0.76–1.27)
EOSINOPHIL # BLD AUTO: 0.3 X10E3/UL (ref 0–0.4)
EOSINOPHIL NFR BLD AUTO: 6 %
ERYTHROCYTE [DISTWIDTH] IN BLOOD BY AUTOMATED COUNT: 12.6 % (ref 11.6–15.4)
GLUCOSE SERPL-MCNC: 108 MG/DL (ref 65–99)
HCT VFR BLD AUTO: 48 % (ref 37.5–51)
HGB BLD-MCNC: 16.1 G/DL (ref 13–17.7)
IMM GRANULOCYTES # BLD AUTO: 0 X10E3/UL (ref 0–0.1)
IMM GRANULOCYTES NFR BLD AUTO: 0 %
LYMPHOCYTES # BLD AUTO: 1 X10E3/UL (ref 0.7–3.1)
LYMPHOCYTES NFR BLD AUTO: 21 %
MCH RBC QN AUTO: 30.5 PG (ref 26.6–33)
MCHC RBC AUTO-ENTMCNC: 33.5 G/DL (ref 31.5–35.7)
MCV RBC AUTO: 91 FL (ref 79–97)
MONOCYTES # BLD AUTO: 0.5 X10E3/UL (ref 0.1–0.9)
MONOCYTES NFR BLD AUTO: 10 %
NEUTROPHILS # BLD AUTO: 3.1 X10E3/UL (ref 1.4–7)
NEUTROPHILS NFR BLD AUTO: 62 %
PLATELET # BLD AUTO: 181 X10E3/UL (ref 150–450)
POTASSIUM SERPL-SCNC: 4.2 MMOL/L (ref 3.5–5.2)
RBC # BLD AUTO: 5.28 X10E6/UL (ref 4.14–5.8)
SODIUM SERPL-SCNC: 141 MMOL/L (ref 134–144)
WBC # BLD AUTO: 5 X10E3/UL (ref 3.4–10.8)

## 2022-02-16 ENCOUNTER — ANESTHESIA EVENT (OUTPATIENT)
Dept: SURGERY | Age: 67
End: 2022-02-16
Payer: MEDICARE

## 2022-02-17 ENCOUNTER — HOSPITAL ENCOUNTER (OUTPATIENT)
Age: 67
Discharge: HOME | End: 2022-02-17
Attending: SURGERY | Admitting: SURGERY
Payer: MEDICARE

## 2022-02-17 ENCOUNTER — ANESTHESIA (OUTPATIENT)
Dept: SURGERY | Age: 67
End: 2022-02-17
Payer: MEDICARE

## 2022-02-17 VITALS
SYSTOLIC BLOOD PRESSURE: 124 MMHG | HEIGHT: 76 IN | DIASTOLIC BLOOD PRESSURE: 72 MMHG | HEART RATE: 76 BPM | RESPIRATION RATE: 18 BRPM | BODY MASS INDEX: 27.58 KG/M2 | OXYGEN SATURATION: 94 % | TEMPERATURE: 97.5 F | WEIGHT: 226.5 LBS

## 2022-02-17 DIAGNOSIS — K43.9 SUPRAUMBILICAL HERNIA WITHOUT GANGRENE AND WITHOUT OBSTRUCTION: Primary | ICD-10-CM

## 2022-02-17 DIAGNOSIS — K42.9 UMBILICAL HERNIA WITHOUT OBSTRUCTION AND WITHOUT GANGRENE: ICD-10-CM

## 2022-02-17 DIAGNOSIS — G89.18 POSTOPERATIVE PAIN: ICD-10-CM

## 2022-02-17 PROCEDURE — 00790 ANES IPER UPR ABD NOS: CPT | Performed by: ANESTHESIOLOGY

## 2022-02-17 PROCEDURE — 74011000250 HC RX REV CODE- 250: Performed by: NURSE ANESTHETIST, CERTIFIED REGISTERED

## 2022-02-17 PROCEDURE — 74011250636 HC RX REV CODE- 250/636: Performed by: NURSE ANESTHETIST, CERTIFIED REGISTERED

## 2022-02-17 PROCEDURE — 74011250636 HC RX REV CODE- 250/636: Performed by: SURGERY

## 2022-02-17 PROCEDURE — C1781 MESH (IMPLANTABLE): HCPCS | Performed by: SURGERY

## 2022-02-17 PROCEDURE — 74011000272 HC RX REV CODE- 272: Performed by: SURGERY

## 2022-02-17 PROCEDURE — 77030018673: Performed by: SURGERY

## 2022-02-17 PROCEDURE — 77030008756 HC TU IRR SUC STRY -B: Performed by: SURGERY

## 2022-02-17 PROCEDURE — 77030002933 HC SUT MCRYL J&J -A: Performed by: SURGERY

## 2022-02-17 PROCEDURE — 77030026438 HC STYL ET INTUB CARD -A: Performed by: NURSE ANESTHETIST, CERTIFIED REGISTERED

## 2022-02-17 PROCEDURE — 76060000033 HC ANESTHESIA 1 TO 1.5 HR: Performed by: SURGERY

## 2022-02-17 PROCEDURE — 74011000250 HC RX REV CODE- 250: Performed by: SURGERY

## 2022-02-17 PROCEDURE — 2709999900 HC NON-CHARGEABLE SUPPLY: Performed by: SURGERY

## 2022-02-17 PROCEDURE — 77030036554: Performed by: SURGERY

## 2022-02-17 PROCEDURE — 88302 TISSUE EXAM BY PATHOLOGIST: CPT

## 2022-02-17 PROCEDURE — 77030008683 HC TU ET CUF COVD -A: Performed by: NURSE ANESTHETIST, CERTIFIED REGISTERED

## 2022-02-17 PROCEDURE — 77030008608 HC TRCR ENDOSC SMTH AMR -B: Performed by: SURGERY

## 2022-02-17 PROCEDURE — 76210000021 HC REC RM PH II 0.5 TO 1 HR: Performed by: SURGERY

## 2022-02-17 PROCEDURE — 77030033188 HC TBNG FLTRD BIIFUR DISP CNMD -C: Performed by: SURGERY

## 2022-02-17 PROCEDURE — 64488 TAP BLOCK BI INJECTION: CPT | Performed by: ANESTHESIOLOGY

## 2022-02-17 PROCEDURE — S2900 ROBOTIC SURGICAL SYSTEM: HCPCS | Performed by: SURGERY

## 2022-02-17 PROCEDURE — 76210000000 HC OR PH I REC 2 TO 2.5 HR: Performed by: SURGERY

## 2022-02-17 PROCEDURE — 77030016151 HC PROTCTR LNS DFOG COVD -B: Performed by: SURGERY

## 2022-02-17 PROCEDURE — 77030013079 HC BLNKT BAIR HGGR 3M -A: Performed by: NURSE ANESTHETIST, CERTIFIED REGISTERED

## 2022-02-17 PROCEDURE — 49652 PR LAP, VENTRAL HERNIA REPAIR,REDUCIBLE: CPT | Performed by: SURGERY

## 2022-02-17 PROCEDURE — 00790 ANES IPER UPR ABD NOS: CPT | Performed by: NURSE ANESTHETIST, CERTIFIED REGISTERED

## 2022-02-17 PROCEDURE — 77030022704 HC SUT VLOC COVD -B: Performed by: SURGERY

## 2022-02-17 PROCEDURE — 77030028402 HC SYS LAPSC TISS RETRV AMR -B: Performed by: SURGERY

## 2022-02-17 PROCEDURE — 77030018836 HC SOL IRR NACL ICUM -A: Performed by: SURGERY

## 2022-02-17 PROCEDURE — 77030035277 HC OBTRTR BLDELSS DISP INTU -B: Performed by: SURGERY

## 2022-02-17 PROCEDURE — 77030020703 HC SEAL CANN DISP INTU -B: Performed by: SURGERY

## 2022-02-17 PROCEDURE — 76010000934 HC OR TIME 1 TO 1.5HR INTENSV - TIER 2: Performed by: SURGERY

## 2022-02-17 DEVICE — MESH W/ECHO PS 4X6IN ELLIPSE -- VENTRALIGHT ORDER BY CA: Type: IMPLANTABLE DEVICE | Site: ABDOMEN | Status: FUNCTIONAL

## 2022-02-17 RX ORDER — MIDAZOLAM HYDROCHLORIDE 1 MG/ML
INJECTION, SOLUTION INTRAMUSCULAR; INTRAVENOUS AS NEEDED
Status: DISCONTINUED | OUTPATIENT
Start: 2022-02-17 | End: 2022-02-17 | Stop reason: HOSPADM

## 2022-02-17 RX ORDER — FENTANYL CITRATE 50 UG/ML
INJECTION, SOLUTION INTRAMUSCULAR; INTRAVENOUS AS NEEDED
Status: DISCONTINUED | OUTPATIENT
Start: 2022-02-17 | End: 2022-02-17 | Stop reason: HOSPADM

## 2022-02-17 RX ORDER — PROPOFOL 10 MG/ML
INJECTION, EMULSION INTRAVENOUS AS NEEDED
Status: DISCONTINUED | OUTPATIENT
Start: 2022-02-17 | End: 2022-02-17 | Stop reason: HOSPADM

## 2022-02-17 RX ORDER — FENTANYL CITRATE 50 UG/ML
50 INJECTION, SOLUTION INTRAMUSCULAR; INTRAVENOUS
Status: DISCONTINUED | OUTPATIENT
Start: 2022-02-17 | End: 2022-02-18 | Stop reason: HOSPADM

## 2022-02-17 RX ORDER — SODIUM CHLORIDE, SODIUM LACTATE, POTASSIUM CHLORIDE, CALCIUM CHLORIDE 600; 310; 30; 20 MG/100ML; MG/100ML; MG/100ML; MG/100ML
50 INJECTION, SOLUTION INTRAVENOUS CONTINUOUS
Status: DISCONTINUED | OUTPATIENT
Start: 2022-02-17 | End: 2022-02-18 | Stop reason: HOSPADM

## 2022-02-17 RX ORDER — DIPHENHYDRAMINE HYDROCHLORIDE 50 MG/ML
12.5 INJECTION, SOLUTION INTRAMUSCULAR; INTRAVENOUS
Status: DISCONTINUED | OUTPATIENT
Start: 2022-02-17 | End: 2022-02-18 | Stop reason: HOSPADM

## 2022-02-17 RX ORDER — DEXAMETHASONE SODIUM PHOSPHATE 4 MG/ML
INJECTION, SOLUTION INTRA-ARTICULAR; INTRALESIONAL; INTRAMUSCULAR; INTRAVENOUS; SOFT TISSUE AS NEEDED
Status: DISCONTINUED | OUTPATIENT
Start: 2022-02-17 | End: 2022-02-17 | Stop reason: HOSPADM

## 2022-02-17 RX ORDER — ACETAMINOPHEN 325 MG/1
650 TABLET ORAL EVERY 6 HOURS
Qty: 42 TABLET | Refills: 0 | Status: SHIPPED | OUTPATIENT
Start: 2022-02-17 | End: 2022-03-02

## 2022-02-17 RX ORDER — SODIUM CHLORIDE 0.9 % (FLUSH) 0.9 %
5-40 SYRINGE (ML) INJECTION AS NEEDED
Status: DISCONTINUED | OUTPATIENT
Start: 2022-02-17 | End: 2022-02-17 | Stop reason: HOSPADM

## 2022-02-17 RX ORDER — EPHEDRINE SULFATE/0.9% NACL/PF 25 MG/5 ML
SYRINGE (ML) INTRAVENOUS AS NEEDED
Status: DISCONTINUED | OUTPATIENT
Start: 2022-02-17 | End: 2022-02-17 | Stop reason: HOSPADM

## 2022-02-17 RX ORDER — OXYCODONE HYDROCHLORIDE 5 MG/1
5 TABLET ORAL
Qty: 20 TABLET | Refills: 0 | Status: SHIPPED | OUTPATIENT
Start: 2022-02-17 | End: 2022-02-20

## 2022-02-17 RX ORDER — DOCUSATE SODIUM 100 MG/1
100 CAPSULE, LIQUID FILLED ORAL 2 TIMES DAILY
Qty: 60 CAPSULE | Refills: 2 | Status: SHIPPED | OUTPATIENT
Start: 2022-02-17 | End: 2022-03-02

## 2022-02-17 RX ORDER — ONDANSETRON 2 MG/ML
4 INJECTION INTRAMUSCULAR; INTRAVENOUS ONCE
Status: COMPLETED | OUTPATIENT
Start: 2022-02-17 | End: 2022-02-17

## 2022-02-17 RX ORDER — SODIUM CHLORIDE 0.9 % (FLUSH) 0.9 %
5-40 SYRINGE (ML) INJECTION EVERY 8 HOURS
Status: DISCONTINUED | OUTPATIENT
Start: 2022-02-17 | End: 2022-02-17 | Stop reason: HOSPADM

## 2022-02-17 RX ORDER — LIDOCAINE HYDROCHLORIDE 10 MG/ML
0.1 INJECTION, SOLUTION EPIDURAL; INFILTRATION; INTRACAUDAL; PERINEURAL AS NEEDED
Status: DISCONTINUED | OUTPATIENT
Start: 2022-02-17 | End: 2022-02-17 | Stop reason: HOSPADM

## 2022-02-17 RX ORDER — BUPIVACAINE HYDROCHLORIDE 5 MG/ML
INJECTION, SOLUTION EPIDURAL; INTRACAUDAL AS NEEDED
Status: DISCONTINUED | OUTPATIENT
Start: 2022-02-17 | End: 2022-02-17 | Stop reason: HOSPADM

## 2022-02-17 RX ORDER — GLYCOPYRROLATE 0.2 MG/ML
INJECTION INTRAMUSCULAR; INTRAVENOUS AS NEEDED
Status: DISCONTINUED | OUTPATIENT
Start: 2022-02-17 | End: 2022-02-17 | Stop reason: HOSPADM

## 2022-02-17 RX ORDER — SUCCINYLCHOLINE CHLORIDE 20 MG/ML
INJECTION INTRAMUSCULAR; INTRAVENOUS AS NEEDED
Status: DISCONTINUED | OUTPATIENT
Start: 2022-02-17 | End: 2022-02-17 | Stop reason: HOSPADM

## 2022-02-17 RX ORDER — NEOSTIGMINE METHYLSULFATE 1 MG/ML
INJECTION, SOLUTION INTRAVENOUS AS NEEDED
Status: DISCONTINUED | OUTPATIENT
Start: 2022-02-17 | End: 2022-02-17 | Stop reason: HOSPADM

## 2022-02-17 RX ORDER — ALBUTEROL SULFATE 0.83 MG/ML
2.5 SOLUTION RESPIRATORY (INHALATION) AS NEEDED
Status: DISCONTINUED | OUTPATIENT
Start: 2022-02-17 | End: 2022-02-18 | Stop reason: HOSPADM

## 2022-02-17 RX ORDER — NALOXONE HYDROCHLORIDE 0.4 MG/ML
0.04 INJECTION, SOLUTION INTRAMUSCULAR; INTRAVENOUS; SUBCUTANEOUS AS NEEDED
Status: DISCONTINUED | OUTPATIENT
Start: 2022-02-17 | End: 2022-02-18 | Stop reason: HOSPADM

## 2022-02-17 RX ORDER — SODIUM CHLORIDE, SODIUM LACTATE, POTASSIUM CHLORIDE, CALCIUM CHLORIDE 600; 310; 30; 20 MG/100ML; MG/100ML; MG/100ML; MG/100ML
50 INJECTION, SOLUTION INTRAVENOUS CONTINUOUS
Status: DISCONTINUED | OUTPATIENT
Start: 2022-02-17 | End: 2022-02-17 | Stop reason: HOSPADM

## 2022-02-17 RX ORDER — LIDOCAINE HYDROCHLORIDE 20 MG/ML
INJECTION, SOLUTION EPIDURAL; INFILTRATION; INTRACAUDAL; PERINEURAL AS NEEDED
Status: DISCONTINUED | OUTPATIENT
Start: 2022-02-17 | End: 2022-02-17 | Stop reason: HOSPADM

## 2022-02-17 RX ORDER — IBUPROFEN 600 MG/1
600 TABLET ORAL EVERY 6 HOURS
Qty: 28 TABLET | Refills: 0 | Status: SHIPPED | OUTPATIENT
Start: 2022-02-17 | End: 2022-03-02

## 2022-02-17 RX ORDER — ONDANSETRON 2 MG/ML
INJECTION INTRAMUSCULAR; INTRAVENOUS AS NEEDED
Status: DISCONTINUED | OUTPATIENT
Start: 2022-02-17 | End: 2022-02-17 | Stop reason: HOSPADM

## 2022-02-17 RX ORDER — HYDROMORPHONE HYDROCHLORIDE 2 MG/ML
0.5 INJECTION, SOLUTION INTRAMUSCULAR; INTRAVENOUS; SUBCUTANEOUS AS NEEDED
Status: DISCONTINUED | OUTPATIENT
Start: 2022-02-17 | End: 2022-02-18 | Stop reason: HOSPADM

## 2022-02-17 RX ORDER — ROCURONIUM BROMIDE 10 MG/ML
INJECTION, SOLUTION INTRAVENOUS AS NEEDED
Status: DISCONTINUED | OUTPATIENT
Start: 2022-02-17 | End: 2022-02-17 | Stop reason: HOSPADM

## 2022-02-17 RX ADMIN — ROCURONIUM BROMIDE 5 MG: 50 INJECTION INTRAVENOUS at 12:58

## 2022-02-17 RX ADMIN — FENTANYL CITRATE 50 MCG: 50 INJECTION, SOLUTION INTRAMUSCULAR; INTRAVENOUS at 13:03

## 2022-02-17 RX ADMIN — SUCCINYLCHOLINE CHLORIDE 100 MG: 20 INJECTION, SOLUTION INTRAMUSCULAR; INTRAVENOUS at 12:58

## 2022-02-17 RX ADMIN — LIDOCAINE HYDROCHLORIDE 100 MG: 20 INJECTION, SOLUTION EPIDURAL; INFILTRATION; INTRACAUDAL; PERINEURAL at 12:58

## 2022-02-17 RX ADMIN — GLYCOPYRROLATE 0.2 MG: 0.2 INJECTION INTRAMUSCULAR; INTRAVENOUS at 12:52

## 2022-02-17 RX ADMIN — Medication 20 MG: at 13:34

## 2022-02-17 RX ADMIN — MIDAZOLAM HYDROCHLORIDE 2 MG: 2 INJECTION, SOLUTION INTRAMUSCULAR; INTRAVENOUS at 12:52

## 2022-02-17 RX ADMIN — HYDROMORPHONE HYDROCHLORIDE 0.5 MG: 2 INJECTION, SOLUTION INTRAMUSCULAR; INTRAVENOUS; SUBCUTANEOUS at 15:20

## 2022-02-17 RX ADMIN — ONDANSETRON 4 MG: 2 INJECTION INTRAMUSCULAR; INTRAVENOUS at 15:50

## 2022-02-17 RX ADMIN — FENTANYL CITRATE 50 MCG: 50 INJECTION, SOLUTION INTRAMUSCULAR; INTRAVENOUS at 13:21

## 2022-02-17 RX ADMIN — HYDROMORPHONE HYDROCHLORIDE 0.5 MG: 2 INJECTION, SOLUTION INTRAMUSCULAR; INTRAVENOUS; SUBCUTANEOUS at 15:50

## 2022-02-17 RX ADMIN — ROCURONIUM BROMIDE 35 MG: 50 INJECTION INTRAVENOUS at 13:05

## 2022-02-17 RX ADMIN — Medication 10 MG: at 13:37

## 2022-02-17 RX ADMIN — PROPOFOL 50 MG: 10 INJECTION, EMULSION INTRAVENOUS at 13:03

## 2022-02-17 RX ADMIN — FAMOTIDINE 20 MG: 10 INJECTION INTRAVENOUS at 12:08

## 2022-02-17 RX ADMIN — FENTANYL CITRATE 100 MCG: 50 INJECTION, SOLUTION INTRAMUSCULAR; INTRAVENOUS at 12:58

## 2022-02-17 RX ADMIN — SODIUM CHLORIDE, SODIUM LACTATE, POTASSIUM CHLORIDE, AND CALCIUM CHLORIDE: 600; 310; 30; 20 INJECTION, SOLUTION INTRAVENOUS at 13:58

## 2022-02-17 RX ADMIN — DEXAMETHASONE SODIUM PHOSPHATE 4 MG: 4 INJECTION, SOLUTION INTRAMUSCULAR; INTRAVENOUS at 13:10

## 2022-02-17 RX ADMIN — WATER 2 G: 1 INJECTION INTRAMUSCULAR; INTRAVENOUS; SUBCUTANEOUS at 13:10

## 2022-02-17 RX ADMIN — FENTANYL CITRATE 50 MCG: 50 INJECTION, SOLUTION INTRAMUSCULAR; INTRAVENOUS at 14:31

## 2022-02-17 RX ADMIN — SODIUM CHLORIDE, SODIUM LACTATE, POTASSIUM CHLORIDE, AND CALCIUM CHLORIDE 50 ML/HR: 600; 310; 30; 20 INJECTION, SOLUTION INTRAVENOUS at 12:08

## 2022-02-17 RX ADMIN — ONDANSETRON 4 MG: 2 INJECTION INTRAMUSCULAR; INTRAVENOUS at 13:10

## 2022-02-17 RX ADMIN — PROPOFOL 150 MG: 10 INJECTION, EMULSION INTRAVENOUS at 12:58

## 2022-02-17 RX ADMIN — NEOSTIGMINE METHYLSULFATE 5 MG: 1 INJECTION INTRAVENOUS at 14:06

## 2022-02-17 RX ADMIN — HYDROMORPHONE HYDROCHLORIDE 0.5 MG: 2 INJECTION, SOLUTION INTRAMUSCULAR; INTRAVENOUS; SUBCUTANEOUS at 16:09

## 2022-02-17 RX ADMIN — SODIUM CHLORIDE, SODIUM LACTATE, POTASSIUM CHLORIDE, AND CALCIUM CHLORIDE: 600; 310; 30; 20 INJECTION, SOLUTION INTRAVENOUS at 12:52

## 2022-02-17 RX ADMIN — GLYCOPYRROLATE 0.4 MG: 0.2 INJECTION INTRAMUSCULAR; INTRAVENOUS at 14:06

## 2022-02-17 NOTE — DISCHARGE INSTRUCTIONS
Patient Education     DISCHARGE SUMMARY from Nurse    PATIENT INSTRUCTIONS:    After general anesthesia or intravenous sedation, for 24 hours or while taking prescription Narcotics:  · Limit your activities  · Do not drive and operate hazardous machinery  · Do not make important personal or business decisions  · Do  not drink alcoholic beverages  · If you have not urinated within 8 hours after discharge, please contact your surgeon on call. Report the following to your surgeon:  · Excessive pain, swelling, redness or odor of or around the surgical area  · Temperature over 100.5  · Nausea and vomiting lasting longer than 4 hours or if unable to take medications  · Any signs of decreased circulation or nerve impairment to extremity: change in color, persistent  numbness, tingling, coldness or increase pain  · Any questions    What to do at Home:  Recommended activity: Activity as tolerated and no driving for today. *  Please give a list of your current medications to your Primary Care Provider. *  Please update this list whenever your medications are discontinued, doses are      changed, or new medications (including over-the-counter products) are added. *  Please carry medication information at all times in case of emergency situations. These are general instructions for a healthy lifestyle:    No smoking/ No tobacco products/ Avoid exposure to second hand smoke  Surgeon General's Warning:  Quitting smoking now greatly reduces serious risk to your health.     Obesity, smoking, and sedentary lifestyle greatly increases your risk for illness    A healthy diet, regular physical exercise & weight monitoring are important for maintaining a healthy lifestyle    You may be retaining fluid if you have a history of heart failure or if you experience any of the following symptoms:  Weight gain of 3 pounds or more overnight or 5 pounds in a week, increased swelling in our hands or feet or shortness of breath while lying flat in bed. Please call your doctor as soon as you notice any of these symptoms; do not wait until your next office visit. The discharge information has been reviewed with the patient and spouse. The patient and spouse verbalized understanding. Discharge medications reviewed with the patient and spouse and appropriate educational materials and side effects teaching were provided. ___________________________________________________________________________________________________________________________________[facility]  Mike Goel MD, [facility]  General Surgery    Pt may remove the dressing and shower in two days. Please use your wash clothe to wash the incision with soap and water once the dressings have been removed  No driving or operating heavy machinery while on narcotic pain medications. Please apply an ice pack to the operative site for 30 minutes 3 times daily to help reduce pain and swelling and the need for narcotic pain medication. No strenuous activity or contact sports for two weeks. No lifting greater than 15 lbs for 2 weeks. Call MD for any redness, swelling, bleeding or pus at the incision. Also call for any nausea, vomiting, increased pain or pain uncontrolled by pain medicine. Abdominal Hernia Repair: What to Expect at Home  Your Recovery  After surgery to repair your hernia, you are likely to have pain for a few days. You may also feel tired and have less energy than normal. This is common. You should feel better after a few days and will probably feel much better in 7 days. For several weeks you may feel discomfort or pulling in the hernia repair when you move. You may have some bruising around the area of the repair. This is normal.  This care sheet gives you a general idea about how long it will take for you to recover. But each person recovers at a different pace.  Follow the steps below to get better as quickly as possible. How can you care for yourself at home? Activity    · Rest when you feel tired. Getting enough sleep will help you recover.     · Try to walk each day. Start by walking a little more than you did the day before. Bit by bit, increase the amount you walk. Walking boosts blood flow and helps prevent pneumonia and constipation.     · If your doctor gives you an abdominal binder to wear, use it as directed. This is an elastic bandage that wraps around your belly and upper hips. It helps support your belly muscles after surgery.     · Avoid strenuous activities, such as biking, jogging, weight lifting, or aerobic exercise, until your doctor says it is okay.     · Avoid lifting anything that would make you strain. This may include heavy grocery bags and milk containers, a heavy briefcase or backpack, cat litter or dog food bags, a vacuum , or a child.     · Ask your doctor when you can drive again.     · Most people are able to return to work within 1 to 2 weeks after surgery. But if your job requires that you do heavy lifting or strenuous activity, you may need to take 4 to 6 weeks off from work.     · You may shower 24 to 48 hours after surgery, if your doctor okays it. Pat the cut (incision) dry. Do not take a bath for the first 2 weeks, or until your doctor tells you it is okay.     · Ask your doctor when it is okay for you to have sex. Diet    · You can eat your normal diet. If your stomach is upset, try bland, low-fat foods like plain rice, broiled chicken, toast, and yogurt.     · Drink plenty of fluids (unless your doctor tells you not to).     · You may notice that your bowel movements are not regular right after your surgery. This is common. Avoid constipation and straining with bowel movements. You may want to take a fiber supplement every day. If you have not had a bowel movement after a couple of days, ask your doctor about taking a mild laxative.    Medicines    · Your doctor will tell you if and when you can restart your medicines. You will also be given instructions about taking any new medicines.     · If you take aspirin or some other blood thinner, ask your doctor if and when to start taking it again. Make sure that you understand exactly what your doctor wants you to do.     · Be safe with medicines. Take pain medicines exactly as directed. ? If the doctor gave you a prescription medicine for pain, take it as prescribed. ? If you are not taking a prescription pain medicine, ask your doctor if you can take an over-the-counter medicine.     · If your doctor prescribed antibiotics, take them as directed. Do not stop taking them just because you feel better. You need to take the full course of antibiotics.     · If you think your pain medicine is making you sick to your stomach:  ? Take your medicine after meals (unless your doctor has told you not to). ? Ask your doctor for a different pain medicine. Incision care    · If you have strips of tape on the cut (incision) the doctor made, leave the tape on for a week or until it falls off. Or follow your doctor's instructions for removing the tape.     · If you have staples closing the cut, you will need to visit your doctor in 1 to 2 weeks to have them removed.     · Wash the area daily with warm, soapy water, and pat it dry. Don't use hydrogen peroxide or alcohol, which can slow healing. You may cover the area with a gauze bandage if it weeps or rubs against clothing. Change the bandage every day. Other instructions    · Hold a pillow over your incision when you cough or take deep breaths. This will support your belly and decrease your pain.     · Do breathing exercises at home as instructed by your doctor. This will help prevent pneumonia.     · If you had laparoscopic surgery, you may also have pain in your shoulder. The pain usually lasts about a day or two. Follow-up care is a key part of your treatment and safety.  Be sure to make and go to all appointments, and call your doctor if you are having problems. It's also a good idea to know your test results and keep a list of the medicines you take. When should you call for help? Call 911 anytime you think you may need emergency care. For example, call if:    · You passed out (lost consciousness).     · You are short of breath. Call your doctor now or seek immediate medical care if:    · You are sick to your stomach and cannot drink fluids.     · You have signs of a blood clot in your leg (called a deep vein thrombosis), such as:  ? Pain in your calf, back of the knee, thigh, or groin. ? Redness and swelling in your leg or groin.     · You have signs of infection, such as:  ? Increased pain, swelling, warmth, or redness. ? Red streaks leading from the incision. ? Pus draining from the incision. ? A fever.     · You cannot pass stools or gas.     · You have pain that does not get better after you take pain medicine.     · You have loose stitches, or your incision comes open.     · Bright red blood has soaked through the bandage over your incision. Watch closely for changes in your health, and be sure to contact your doctor if you have any problems. Where can you learn more? Go to http://www.gray.com/  Enter B577 in the search box to learn more about \"Abdominal Hernia Repair: What to Expect at Home. \"  Current as of: February 10, 2021               Content Version: 13.0  © 4224-2676 [employer]wise, [facility]. Care instructions adapted under license by [employer] (which disclaims liability or warranty for this information). If you have questions about a medical condition or this instruction, always ask your healthcare professional. [place] [address] any warranty or liability for your use of this information.

## 2022-02-17 NOTE — INTERVAL H&P NOTE
Update History & Physical    The Patient's History and Physical of February 8, 2022 was reviewed with the patient and I examined the patient. There was no change. The surgical site was confirmed by the patient and me. Plan:  The risk, benefits, expected outcome, and alternative to the recommended procedure have been discussed with the patient. Patient understands and wants to proceed with the procedure.     Electronically signed by Katarzyna Marx MD on 2/17/2022 at 11:11 AM

## 2022-02-17 NOTE — DISCHARGE SUMMARY
4 Len Benz MD, [facility]  General Surgery  Discharge Summary     Patient ID:  Nidia Rose  [id number]  17 y.o.  1955    Admit Date: 2/17/2022    Discharge Date: 2/17/2022    Admission Diagnoses: Umbilical hernia without obstruction or gangrene [K42.9]; Supraumbilical hernia [Q86.5]; Supraumbilical hernia without gangrene and without obstruction [K43.9];Umbilical hernia without obstruction and without gangrene [K42.9]    Discharge Diagnoses:    Problem List as of 2/17/2022 Date Reviewed: 2/8/2022          Codes Class Noted - Resolved    Umbilical hernia without obstruction and without gangrene ICD-10-CM: K42.9  ICD-9-CM: 553.1  2/17/2022 - Present        Supraumbilical hernia without gangrene and without obstruction ICD-10-CM: K43.9  ICD-9-CM: 553.20  2/17/2022 - Present        Ventral hernia ICD-10-CM: K43.9  ICD-9-CM: 553.20  10/5/2018 - Present        Benign essential tremor ICD-10-CM: G25.0  ICD-9-CM: 333.1  2/21/2018 - Present        Prediabetes ICD-10-CM: R73.03  ICD-9-CM: 790.29  8/16/2017 - Present        Primary osteoarthritis of right knee ICD-10-CM: M17.11  ICD-9-CM: 715.16  9/7/2016 - Present        RBBB ICD-10-CM: I45.10  ICD-9-CM: 426.4  8/31/2016 - Present        Aortic valve insufficiency ICD-10-CM: I35.1  ICD-9-CM: 424.1  8/31/2016 - Present        S/P rotator cuff repair ICD-10-CM: J36.884  ICD-9-CM: V45.89  10/13/2015 - Present        Family history of abdominal aortic aneurysm (AAA) ICD-10-CM: Z82.49  ICD-9-CM: V17.49  2/12/2014 - Present        Lumbar herniated disc ICD-10-CM: M51.26  ICD-9-CM: 722.10  7/21/2011 - Present        Hypercholesterolemia ICD-10-CM: E78.00  ICD-9-CM: 272.0  Unknown - Present    Overview Signed 7/7/2011  4:05 PM by Gabo Coronado     did not tolerate a trial of one statin. Unsure which one.              PAC (premature atrial contraction) ICD-10-CM: I49.1  ICD-9-CM: 427.61  Unknown - Present        RESOLVED: Left shoulder pain ICD-10-CM: M25.512  ICD-9-CM: 719.41  Unknown - 8/16/2017        RESOLVED: AAA (abdominal aortic aneurysm) without rupture [facility]) ICD-10-CM: I71.4  ICD-9-CM: 441.4  2/12/2014 - 8/16/2017               Admission Condition: Good    Discharge Condition: Good    Last Procedure: Procedure(s):  ROBOTIC ASSISTED LAPAROSCOPIC REPAIR OF SUPRAUMBILICAL AND UMBILICAL HERNIA WITH MESH (34T13VF VENTRAL LIGHT ST WITH ECHO POSITIONING SYSTEM)/TAP BLOCK    Hospital Course:   Normal hospital course for this procedure. Consults: None    Significant Diagnostic Studies: None    Disposition: home    Patient Instructions:   Current Discharge Medication List      CONTINUE these medications which have NOT CHANGED    Details   topiramate (Topamax) 50 mg tablet Take 50 mg by mouth two (2) times a day. rosuvastatin (CRESTOR) 5 mg tablet TAKE 1 TABLET NIGHTLY  Qty: 90 Tablet, Refills: 3      cyanocobalamin (VITAMIN B-12) 1,000 mcg sublingual tablet Take 1 Tab by mouth daily. Qty: 90 Tab, Refills: 3      venlafaxine-SR (EFFEXOR XR) 150 mg capsule Take 150 mg by mouth daily. Indications: ANXIETY WITH DEPRESSION      naproxen (NAPROSYN) 250 mg tablet Take 250 mg by mouth every eight (8) hours as needed. Activity: See surgical instructions  Diet: Low fat, Low cholesterol  Wound Care: As directed    Follow-up with Dr. Cira Henriquez in 2 weeks.   Follow-up tests/labs None    Signed:  Joao Centeno MD  2/17/2022  12:21 PM

## 2022-02-17 NOTE — ANESTHESIA PREPROCEDURE EVALUATION
Relevant Problems   CARDIOVASCULAR   (+) Aortic valve insufficiency   (+) PAC (premature atrial contraction)   (+) RBBB       Anesthetic History   No history of anesthetic complications            Review of Systems / Medical History  Patient summary reviewed and pertinent labs reviewed    Pulmonary  Within defined limits                 Neuro/Psych   Within defined limits           Cardiovascular            Dysrhythmias            GI/Hepatic/Renal  Within defined limits              Endo/Other        Arthritis     Other Findings              Physical Exam    Airway  Mallampati: II  TM Distance: > 6 cm  Neck ROM: normal range of motion   Mouth opening: Normal     Cardiovascular  Regular rate and rhythm,  S1 and S2 normal,  no murmur, click, rub, or gallop             Dental    Dentition: Poor dentition     Pulmonary  Breath sounds clear to auscultation               Abdominal  GI exam deferred       Other Findings            Anesthetic Plan    ASA: 3  Anesthesia type: general      Post-op pain plan if not by surgeon: peripheral nerve block single    Induction: Intravenous  Anesthetic plan and risks discussed with: Patient

## 2022-02-17 NOTE — OP NOTES
[place] [address]  Mark Dennis                                 OPERATIVE REPORT    PATIENT:     Carter Zarco  MRN:           [id number]    DATE:   2022  BILLIN  ROOM:        [facility]  ATTENDING:   Rosanna Mullen MD  DICTATING:   Rosanna Mullen MD      PREOPERATIVE DIAGNOSIS: supraumbilical and umbilical hernia. POSTOPERATIVE DIAGNOSIS: Same     PROCEDURES PERFORMED: Robot-assisted laparoscopic umbilical hernia repair with mesh. SURGEON: Juan R Marin MD.    ASSISTANT: Beatrice Malone SA    ANESTHESIA: General and local (0.25% Marcaine). FINDINGS: supraumbilical and umbilical hernia containing preperitoneal fat    SPECIMENS REMOVED: Hernia sac    ESTIMATED BLOOD LOSS: 10 mL. FLUIDS: 1100 mL. IMPLANTS:  10 x 15 cm Ventralite ST mesh with echo positioning system    COMPLICATIONS: None    OPERATIVE INDICATION: The patient with a painful umbilical hernia. DESCRIPTION OF OPERATION:  The patient was identified in the holding area, where consent for robot assisted laparoscopic umbilical hernia repair with mesh was verified. The patient was taken to the operating room  where he was placed under general anesthesia in the supine position. The  left arm was tucked to the patient's side. The abdomen was prepped and  draped in a sterile fashion using chlorhexidine solution and sterile  drapes. Juliane Childes was used to add an additional protective barrier between the  skin and the foreign bodies. The time-out was performed to ensure correct  procedure. The local anesthetic was infiltrated into the skin and deep  dermal tissues at each trocar site prior to incision. The initial trocar was placed in the left upper quadrant in the  midclavicular line below the costal margin.  This was to accommodate a 8 mm  blunt-tip trocar assembled to the 5 mm 0-degree scope to create the  Visi-Port system. Safe entry into the peritoneal cavity was visualized. The  pneumoperitoneum was obtained using carbon dioxide gas to 15 mmHg. The  abdomen was briefly explored laparoscopically. The second 8 mm trocar was placed in the mid axillary line on the left side. A third trocar -8 mm -  was placed in the left lower quadrant, approximately 3 cm anterior medial  to the pubic anterior superior iliac crest.  The camera was then moved to the mid axillary trocar. The 10 x 15 cm Ventralite ST mesh with the Echo positioning system was placed into the peritoneal cavity. Three 0 V-Loc sutures were placed into the peritoneal cavity. The robot was then docked. The patient's position prior to docking was supine. The scissor was used at arm #1. The prograsp was used at arm #2 with the camera inserted in the midaxillary trocar. The attention was turned first to the exploration of the umbilical and supraumbilical hernia defects. The peritoneum at the hernia defect and the hernia contents were reduced away from the hernia site with a combination of electrocautery and blunt dissection. The falciform ligament was then taken down cephalad. The balloon port of the Echo positioning system was then taken through the center of the  defect, and used to secure the mesh to the abdominal wall by inflating it. The V-Loc sutures were used to secure the mesh to the abdominal wall. The mesh sat nicely against the abdominal wall. The Echo Positioning  System was removed. Three needles were removed from the peritoneal cavity. The hernia sac was removed from the peritoneal cavity in an Endo Catch. All trocars were removed under direct visualization. The additional local anesthetic was infiltrated into the skin and deep  dermal tissues at each trocar site. A 4-0 Vicryl suture was used to close  the skin at each trocar site. Mastisol, steristrips and bandaids were used to create dressings.    The patient tolerated the procedure very well. DISPOSITION: He was stable, extubated upon transport to the recovery  room.         Marina Bland MD      D: 2/17/2022

## 2022-02-17 NOTE — ANESTHESIA PROCEDURE NOTES
Bilateral TAP block    Start time: 2/17/2022 1:06 PM  End time: 2/17/2022 1:10 PM  Performed by: Todd Lockhart DO  Authorized by: Todd Lockhart DO       Pre-procedure: Indications: at surgeon's request and post-op pain management    Preanesthetic Checklist: patient identified, risks and benefits discussed, site marked, timeout performed, anesthesia consent given and patient being monitored    Timeout Time: 13:06 EST          Block Type:   Block Type:  TAP  Laterality:  Left and right  Monitoring:  Responsive to questions, standard ASA monitoring, continuous pulse ox, oxygen, frequent vital sign checks and heart rate  Injection Technique:  Single shot  Procedures: ultrasound guided    Patient Position: supine  Prep: chlorhexidine    Location:  Abdominal  Needle Type:  Stimuplex  Needle Localization:  Anatomical landmarks and ultrasound guidance  Medication group details: STandard TAP fprmula of Ropivicaine/decadron/dexmedetomidine prepared by pharmacy. Total 20cc each side given.   Med Admin Time: 2/17/2022 1:10 PM    Assessment:  Number of attempts:  2  Injection Assessment:  Incremental injection every 5 mL, ultrasound image on chart, local visualized surrounding nerve on ultrasound, negative aspiration for blood and no intravascular symptoms  Patient tolerance:  Patient tolerated the procedure well with no immediate complications

## 2022-02-17 NOTE — ANESTHESIA POSTPROCEDURE EVALUATION
Procedure(s):  Robot-assisted laparoscopic umbilical hernia repair with mesh. Seng Jacobs general    Anesthesia Post Evaluation      Multimodal analgesia: multimodal analgesia used between 6 hours prior to anesthesia start to PACU discharge  Patient location during evaluation: PACU  Patient participation: complete - patient participated  Level of consciousness: awake and alert  Pain management: adequate  Airway patency: patent  Anesthetic complications: no  Cardiovascular status: acceptable  Respiratory status: acceptable  Hydration status: acceptable  Post anesthesia nausea and vomiting:  controlled  Final Post Anesthesia Temperature Assessment:  Normothermia (36.0-37.5 degrees C)      INITIAL Post-op Vital signs:   Vitals Value Taken Time   /74 02/17/22 1621   Temp 36.4 °C (97.5 °F) 02/17/22 1423   Pulse 92 02/17/22 1629   Resp 16 02/17/22 1629   SpO2 96 % 02/17/22 1629   Vitals shown include unvalidated device data.

## 2022-02-22 NOTE — PERIOP NOTES
Patient verbalized pain as 10 initially. Had to wait for narcotic to be verified prior to withdrawing from pyxis. Medicated with Fentanyl 50 mcg IV at 1431 for pain now rated a 9. This entry documented as late entry on 2/21/2022 at 1902 after it was brought to my attention by a pharmacy audit that the medication was not charted. The remainder fentanyl 50 mcg wasted in pyxis with Padmini Slade RN.

## 2022-03-02 ENCOUNTER — OFFICE VISIT (OUTPATIENT)
Dept: SURGERY | Age: 67
End: 2022-03-02
Payer: MEDICARE

## 2022-03-02 VITALS
BODY MASS INDEX: 27.16 KG/M2 | WEIGHT: 223 LBS | HEART RATE: 64 BPM | SYSTOLIC BLOOD PRESSURE: 134 MMHG | HEIGHT: 76 IN | RESPIRATION RATE: 16 BRPM | DIASTOLIC BLOOD PRESSURE: 76 MMHG | TEMPERATURE: 97 F | OXYGEN SATURATION: 98 %

## 2022-03-02 DIAGNOSIS — Z09 POSTOPERATIVE EXAMINATION: Primary | ICD-10-CM

## 2022-03-02 PROCEDURE — 99024 POSTOP FOLLOW-UP VISIT: CPT | Performed by: SURGERY

## 2022-03-02 NOTE — PROGRESS NOTES
[facility]  General Surgery    Name: Eros Pruitt MRN: [id number]   : 1955 [facility]   Date: 3/2/2022 Admission Date: No admission date for patient encounter. Subjective:  Patient presents today without complaints. Objective:  Vitals:    22 1100   BP: 134/76   Pulse: 64   Resp: 16   Temp: 97 °F (36.1 °C)   TempSrc: Temporal   SpO2: 98%   Weight: 101.2 kg (223 lb)   Height: 6' 4\" (1.93 m)       Physical Exam:    General: Awake and alert, oriented x4, no apparent distress   Abdomen: abdomen is soft with minimal midline tenderness. Incision(s) are C/D/I. No evidence of hernia recurrence with cough. No masses, organomegaly or guarding    Current Medications:  Current Outpatient Medications   Medication Sig Dispense Refill    topiramate (Topamax) 50 mg tablet Take 50 mg by mouth two (2) times a day.  rosuvastatin (CRESTOR) 5 mg tablet TAKE 1 TABLET NIGHTLY 90 Tablet 3    cyanocobalamin (VITAMIN B-12) 1,000 mcg sublingual tablet Take 1 Tab by mouth daily. 90 Tab 3    venlafaxine-SR (EFFEXOR XR) 150 mg capsule Take 150 mg by mouth daily. Indications: ANXIETY WITH DEPRESSION      ibuprofen (MOTRIN) 600 mg tablet Take 1 Tablet by mouth every six (6) hours. 28 Tablet 0    acetaminophen (TYLENOL) 325 mg tablet Take 2 Tablets by mouth every six (6) hours. Indications: pain 42 Tablet 0    docusate sodium (COLACE) 100 mg capsule Take 1 Capsule by mouth two (2) times a day for 90 days. 60 Capsule 2    bisacodyL 5 mg tab Take 5 mg by mouth daily as needed for PRN Reason (Other) (Constipation). 3 Tablet 0       Chart and notes reviewed. Data reviewed. I have evaluated and examined the patient. IMPRESSION:   Patient doing well 2 weeks out from robot-assisted laparoscopic repair of supraumbilical and umbilical hernias with mesh. PLAN:/DISCUSION:   · Lifting restrictions 50 pounds for the next 6 weeks.   · Patient may return to the gym for low impact cardiovascular exercises and resistance training focused on tone left lower weight and higher reps with proper breathing.   · Follow-up as needed        Slick Driver MD

## 2022-03-02 NOTE — PROGRESS NOTES
Kaya Salgado is a 79 y.o. male  Chief Complaint   Patient presents with    Post OP Follow Up     hernia repair     1. Have you been to the ER, urgent care clinic since your last visit? Hospitalized since your last visit? No    2. Have you seen or consulted any other health care providers outside of the [address] [place] [address] since your last visit? Include any pap smears or colon screening.  No

## 2022-03-10 DIAGNOSIS — R91.8 PULMONARY NODULES: Primary | ICD-10-CM

## 2022-03-18 PROBLEM — K43.9 VENTRAL HERNIA: Status: ACTIVE | Noted: 2018-10-05

## 2022-03-18 PROBLEM — K43.9 SUPRAUMBILICAL HERNIA WITHOUT GANGRENE AND WITHOUT OBSTRUCTION: Status: ACTIVE | Noted: 2022-02-17

## 2022-03-19 PROBLEM — K42.9 UMBILICAL HERNIA WITHOUT OBSTRUCTION AND WITHOUT GANGRENE: Status: ACTIVE | Noted: 2022-02-17

## 2022-03-19 PROBLEM — R73.03 PREDIABETES: Status: ACTIVE | Noted: 2017-08-16

## 2022-03-19 PROBLEM — G25.0 BENIGN ESSENTIAL TREMOR: Status: ACTIVE | Noted: 2018-02-21

## 2022-03-21 NOTE — LETTER
4/1/2021 11:01 AM 
 
Mr. Vishal Davis [address] [address] Vishal Davis was seen in our office on 04/01/2021 for cardiac evaluation. From a cardiac standpoint he is low risk for knee surgery. He should stay on inderal perioperatively. Please feel free to contact our office if you have any questions regarding this patient. Sincerely, Fabio Davis MD 
 PAST SURGICAL HISTORY:  No significant past surgical history

## 2022-04-05 ENCOUNTER — TELEPHONE (OUTPATIENT)
Dept: SURGERY | Age: 67
End: 2022-04-05

## 2022-04-05 NOTE — TELEPHONE ENCOUNTER
Spoke with  Sabrina Rodríguez today, he is experiencing a poking,jagged pain below where his hernia was. Had helped his neighbor who had fallen in an emergency, worried that may have caused it. He is scheduled for follow up 4/26/22, does he need to come in sooner?

## 2022-04-27 ENCOUNTER — OFFICE VISIT (OUTPATIENT)
Dept: SURGERY | Age: 67
End: 2022-04-27
Payer: MEDICARE

## 2022-04-27 VITALS
DIASTOLIC BLOOD PRESSURE: 84 MMHG | HEART RATE: 75 BPM | WEIGHT: 222 LBS | TEMPERATURE: 98.2 F | BODY MASS INDEX: 27.03 KG/M2 | HEIGHT: 76 IN | OXYGEN SATURATION: 99 % | RESPIRATION RATE: 16 BRPM | SYSTOLIC BLOOD PRESSURE: 136 MMHG

## 2022-04-27 DIAGNOSIS — Z09 POSTOPERATIVE EXAMINATION: Primary | ICD-10-CM

## 2022-04-27 PROCEDURE — 99024 POSTOP FOLLOW-UP VISIT: CPT | Performed by: SURGERY

## 2022-05-03 NOTE — PROGRESS NOTES
[facility]  General Surgery    Name: Claudeen Lunch MRN: [id number]   : 1955 [facility]   Date: 5/3/2022 Admission Date: No admission date for patient encounter. Subjective:  Patient presents today to follow-up hernia repair with mesh. He has no complaints. Objective:  Vitals:    22 1527   BP: 136/84   Pulse: 75   Resp: 16   Temp: 98.2 °F (36.8 °C)   TempSrc: Temporal   SpO2: 99%   Weight: 100.7 kg (222 lb)   Height: 6' 4\" (1.93 m)       Physical Exam:    General: Awake and alert, oriented x4, no apparent distress   Abdomen: abdomen is soft with midline tenderness. Incision(s) are C/D/I. No masses, organomegaly or guarding    Current Medications:  Current Outpatient Medications   Medication Sig Dispense Refill    topiramate (Topamax) 50 mg tablet Take 50 mg by mouth two (2) times a day.  rosuvastatin (CRESTOR) 5 mg tablet TAKE 1 TABLET NIGHTLY 90 Tablet 3    cyanocobalamin (VITAMIN B-12) 1,000 mcg sublingual tablet Take 1 Tab by mouth daily. 90 Tab 3    venlafaxine-SR (EFFEXOR XR) 150 mg capsule Take 150 mg by mouth daily. Indications: ANXIETY WITH DEPRESSION         Chart and notes reviewed. Data reviewed. I have evaluated and examined the patient. IMPRESSION:   · Patient healing well 2 weeks out from robot-assisted laparoscopic umbilical hernia repair with mesh      PLAN:/DISCUSION:   · Continue lifting restrictions to less than 50 pounds.   · Follow-up in 4 weeks        Mickie Staley MD

## 2022-06-13 ENCOUNTER — HOSPITAL ENCOUNTER (OUTPATIENT)
Dept: CT IMAGING | Age: 67
Discharge: HOME | End: 2022-06-13
Attending: FAMILY MEDICINE
Payer: MEDICARE

## 2022-06-13 DIAGNOSIS — R91.8 PULMONARY NODULES: ICD-10-CM

## 2022-06-13 PROCEDURE — 71250 CT THORAX DX C-: CPT

## 2022-06-15 ENCOUNTER — HOSPITAL ENCOUNTER (OUTPATIENT)
Dept: LAB | Age: 67
Discharge: HOME | End: 2022-06-15

## 2022-06-15 LAB — XX-LABCORP SPECIMEN COL,LCBCF: NORMAL

## 2022-06-15 PROCEDURE — 99001 SPECIMEN HANDLING PT-LAB: CPT

## 2022-06-17 LAB
ALBUMIN SERPL-MCNC: 4.4 G/DL (ref 3.8–4.8)
ALBUMIN/GLOB SERPL: 1.6 {RATIO} (ref 1.2–2.2)
ALP SERPL-CCNC: 68 IU/L (ref 44–121)
ALT SERPL-CCNC: 44 IU/L (ref 0–44)
AST SERPL-CCNC: 39 IU/L (ref 0–40)
BASOPHILS # BLD AUTO: 0 X10E3/UL (ref 0–0.2)
BASOPHILS NFR BLD AUTO: 0 %
BILIRUB SERPL-MCNC: 0.4 MG/DL (ref 0–1.2)
BUN SERPL-MCNC: 12 MG/DL (ref 8–27)
BUN/CREAT SERPL: 12 (ref 10–24)
CALCIUM SERPL-MCNC: 9.2 MG/DL (ref 8.6–10.2)
CHLORIDE SERPL-SCNC: 103 MMOL/L (ref 96–106)
CHOLEST SERPL-MCNC: 218 MG/DL (ref 100–199)
CO2 SERPL-SCNC: 22 MMOL/L (ref 20–29)
CREAT SERPL-MCNC: 0.97 MG/DL (ref 0.76–1.27)
EGFR: 86 ML/MIN/1.73
EOSINOPHIL # BLD AUTO: 0.3 X10E3/UL (ref 0–0.4)
EOSINOPHIL NFR BLD AUTO: 6 %
ERYTHROCYTE [DISTWIDTH] IN BLOOD BY AUTOMATED COUNT: 14 % (ref 11.6–15.4)
GLOBULIN SER CALC-MCNC: 2.7 G/DL (ref 1.5–4.5)
GLUCOSE SERPL-MCNC: 105 MG/DL (ref 65–99)
HBA1C MFR BLD: 5.6 % (ref 4.8–5.6)
HCT VFR BLD AUTO: 48.1 % (ref 37.5–51)
HDLC SERPL-MCNC: 82 MG/DL
HGB BLD-MCNC: 15.1 G/DL (ref 13–17.7)
IMM GRANULOCYTES # BLD AUTO: 0 X10E3/UL (ref 0–0.1)
IMM GRANULOCYTES NFR BLD AUTO: 0 %
IMP & REVIEW OF LAB RESULTS: NORMAL
LDLC SERPL CALC-MCNC: 123 MG/DL (ref 0–99)
LYMPHOCYTES # BLD AUTO: 0.7 X10E3/UL (ref 0.7–3.1)
LYMPHOCYTES NFR BLD AUTO: 12 %
MCH RBC QN AUTO: 29.6 PG (ref 26.6–33)
MCHC RBC AUTO-ENTMCNC: 31.4 G/DL (ref 31.5–35.7)
MCV RBC AUTO: 94 FL (ref 79–97)
MONOCYTES # BLD AUTO: 0.8 X10E3/UL (ref 0.1–0.9)
MONOCYTES NFR BLD AUTO: 13 %
NEUTROPHILS # BLD AUTO: 4.1 X10E3/UL (ref 1.4–7)
NEUTROPHILS NFR BLD AUTO: 69 %
PLATELET # BLD AUTO: 155 X10E3/UL (ref 150–450)
POTASSIUM SERPL-SCNC: 4.4 MMOL/L (ref 3.5–5.2)
PROT SERPL-MCNC: 7.1 G/DL (ref 6–8.5)
PSA SERPL-MCNC: 0.8 NG/ML (ref 0–4)
RBC # BLD AUTO: 5.1 X10E6/UL (ref 4.14–5.8)
SODIUM SERPL-SCNC: 141 MMOL/L (ref 134–144)
TESTOST FREE SERPL-MCNC: 5 PG/ML (ref 6.6–18.1)
TESTOST SERPL-MCNC: 596 NG/DL (ref 264–916)
TRIGL SERPL-MCNC: 77 MG/DL (ref 0–149)
VIT B12 SERPL-MCNC: 1423 PG/ML (ref 232–1245)
VLDLC SERPL CALC-MCNC: 13 MG/DL (ref 5–40)
WBC # BLD AUTO: 5.9 X10E3/UL (ref 3.4–10.8)

## 2022-06-20 ENCOUNTER — OFFICE VISIT (OUTPATIENT)
Dept: CARDIOLOGY CLINIC | Age: 67
End: 2022-06-20
Payer: MEDICARE

## 2022-06-20 VITALS
HEIGHT: 76 IN | HEART RATE: 80 BPM | WEIGHT: 220 LBS | BODY MASS INDEX: 26.79 KG/M2 | SYSTOLIC BLOOD PRESSURE: 134 MMHG | OXYGEN SATURATION: 99 % | DIASTOLIC BLOOD PRESSURE: 80 MMHG

## 2022-06-20 DIAGNOSIS — I49.1 PAC (PREMATURE ATRIAL CONTRACTION): ICD-10-CM

## 2022-06-20 DIAGNOSIS — I45.10 RBBB: Primary | ICD-10-CM

## 2022-06-20 DIAGNOSIS — E78.00 HYPERCHOLESTEROLEMIA: ICD-10-CM

## 2022-06-20 DIAGNOSIS — G25.0 BENIGN ESSENTIAL TREMOR: ICD-10-CM

## 2022-06-20 PROCEDURE — G8427 DOCREV CUR MEDS BY ELIG CLIN: HCPCS | Performed by: INTERNAL MEDICINE

## 2022-06-20 PROCEDURE — 1123F ACP DISCUSS/DSCN MKR DOCD: CPT | Performed by: INTERNAL MEDICINE

## 2022-06-20 PROCEDURE — G8510 SCR DEP NEG, NO PLAN REQD: HCPCS | Performed by: INTERNAL MEDICINE

## 2022-06-20 PROCEDURE — 99214 OFFICE O/P EST MOD 30 MIN: CPT | Performed by: INTERNAL MEDICINE

## 2022-06-20 PROCEDURE — 1101F PT FALLS ASSESS-DOCD LE1/YR: CPT | Performed by: INTERNAL MEDICINE

## 2022-06-20 PROCEDURE — 93000 ELECTROCARDIOGRAM COMPLETE: CPT | Performed by: INTERNAL MEDICINE

## 2022-06-20 PROCEDURE — G8417 CALC BMI ABV UP PARAM F/U: HCPCS | Performed by: INTERNAL MEDICINE

## 2022-06-20 PROCEDURE — G8536 NO DOC ELDER MAL SCRN: HCPCS | Performed by: INTERNAL MEDICINE

## 2022-06-20 PROCEDURE — 3017F COLORECTAL CA SCREEN DOC REV: CPT | Performed by: INTERNAL MEDICINE

## 2022-06-20 NOTE — PROGRESS NOTES
Peyton Salvador presents today for   Chief Complaint   Patient presents with    Follow-up     1 year       Peyton Franco preferred language for health care discussion is english/other. Is someone accompanying this pt? no    Is the patient using any DME equipment during [address]? no    Depression Screening:  3 most recent PHQ Screens 6/20/2022   Little interest or pleasure in doing things Not at all   Feeling down, depressed, irritable, or hopeless Not at all   Total Score PHQ 2 0       Learning Assessment:  Learning Assessment 6/20/2022   PRIMARY LEARNER Patient   HIGHEST LEVEL OF EDUCATION - PRIMARY LEARNER  -   BARRIERS PRIMARY LEARNER -     -   CO-LEARNER CAREGIVER -   PRIMARY LANGUAGE ENGLISH   LEARNER PREFERENCE PRIMARY DEMONSTRATION     -     -     -   ANSWERED BY patient     -   RELATIONSHIP SELF       Abuse Screening:  Abuse Screening Questionnaire 6/20/2022   Do you ever feel afraid of your partner? N   Are you in a relationship with someone who physically or mentally threatens you? N   Is it safe for you to go home? Y       Fall Risk  Fall Risk Assessment, last 12 mths 6/20/2022   Able to walk? Yes   Fall in past 12 months? 0   Do you feel unsteady? 0   Are you worried about falling 0   Is TUG test greater than 12 seconds? -   Is the gait abnormal? -           Pt currently taking Anticoagulant therapy? no    Pt currently taking Antiplatelet therapy ? no      Coordination of Care:  1. Have you been to the ER, urgent care clinic since your last visit? Hospitalized since your last visit? no    2. Have you seen or consulted any other health care providers outside of the [address] [place] [address] since your last visit? Include any pap smears or colon screening.  no

## 2022-06-20 NOTE — PROGRESS NOTES
HISTORY OF PRESENT ILLNESS  Dorota Clark is a 79 y.o. male. Follow-up  Pertinent negatives include no chest pain, no abdominal pain, no headaches and no shortness of breath. Patient presents for a follow-up office visit. He has a past medical history significant for dyslipidemia and atrial premature contractions. He was discovered to have a small abdominal aortic aneurysm measuring 2.5 cm in diameter. He also discovered that he has a family history for premature coronary disease. He underwent a regular exercise treadmill stress test in August 2014 which was negative at 6 minutes total exercise time. He underwent an echocardiogram in March 2016 which showed preserved LV systolic function, EF 62-24% with grade 1 diastolic dysfunction and mild aortic valve insufficiency. The patient was last seen in the office approximately 14 months ago. Since last visit, he states has been feeling well. He denies any prolonged heart palpitations, dizziness, nor syncope. No chest pain, leg swelling, orthopnea or PND. He did lose approximately 20 pounds in weight. Past Medical History:   Diagnosis Date    AAA (abdominal aortic aneurysm) (HCC)     small per cardiac note 2.5 cm in cc 4/2021    Anxiety     Benign essential tremor     Cardiac echocardiogram 03/04/2016    EF 55-60%. No RWMA. Gr 1 DDfx. Mild AI. No significant valvular heart disease.  Cardiac exercise stress test 08/01/2014    Neg EKG on maximal treadmill stress test.  One 6-beat episode of NSVT. Ex time 6 min.  Cardiovascular aorto-iliac duplex 03/12/2015    No AAA. Patent bilateral CIAs. Patent renal arteries.     Hearing loss     left ear    Hemorrhoids     Hypercholesterolemia     has not tolerated some statins in the past    Inguinal hernia, right     Lumbar herniated disc     L4-5, Dr. Reyes Mail PAC (premature atrial contraction)     Prediabetes     RBBB     S/P colonoscopy 1/29/07    Dr. Venice Cook, no polyps  S/P colonoscopy 04/05/2017    Moderate diverticulosis of the sigmoid colon / repeat in 10 years per report      Current Outpatient Medications   Medication Sig Dispense Refill    topiramate (Topamax) 50 mg tablet Take 50 mg by mouth two (2) times a day.  rosuvastatin (CRESTOR) 5 mg tablet TAKE 1 TABLET NIGHTLY 90 Tablet 3    cyanocobalamin (VITAMIN B-12) 1,000 mcg sublingual tablet Take 1 Tab by mouth daily. 90 Tab 3    venlafaxine-SR (EFFEXOR XR) 150 mg capsule Take 150 mg by mouth daily. Indications: ANXIETY WITH DEPRESSION         Allergies   Allergen Reactions    Simvastatin Myalgia        Social History     Tobacco Use    Smoking status: Never Smoker    Smokeless tobacco: Never Used   Vaping Use    Vaping Use: Never used   Substance Use Topics    Alcohol use: Yes     Alcohol/week: 2.0 standard drinks     Types: 2 Cans of beer per week     Comment: 2 beers daily    Drug use: No          Review of Systems   Constitutional: Negative for chills, fever and weight loss. HENT: Negative for nosebleeds. Eyes: Negative for blurred vision and double vision. Respiratory: Negative for cough, shortness of breath and wheezing. Cardiovascular: Negative for chest pain, palpitations, orthopnea, claudication, leg swelling and PND. Gastrointestinal: Negative for abdominal pain, heartburn, nausea and vomiting. Genitourinary: Negative for dysuria and hematuria. Musculoskeletal: Negative for falls, joint pain and myalgias. Skin: Negative for rash. Neurological: Negative for dizziness, focal weakness and headaches. Endo/Heme/Allergies: Does not bruise/bleed easily. Psychiatric/Behavioral: Negative for substance abuse. Visit Vitals  /80 (BP 1 Location: Left upper arm, BP Patient Position: Sitting, BP Cuff Size: Adult)   Pulse 80   Ht 6' 4\" (1.93 m)   Wt 99.8 kg (220 lb)   SpO2 99%   BMI 26.78 kg/m²      Physical Exam  Constitutional:       Appearance: He is well-developed.    HENT: Head: Normocephalic and atraumatic. Eyes:      Conjunctiva/sclera: Conjunctivae normal.   Neck:      Vascular: No carotid bruit or JVD. Cardiovascular:      Rate and Rhythm: Normal rate and regular rhythm. Occasional extrasystoles are present. Pulses: Normal pulses. Heart sounds: S1 normal and S2 normal. No murmur heard. No gallop. No S3 sounds. Pulmonary:      Breath sounds: Normal breath sounds. No wheezing or rales. Abdominal:      General: Bowel sounds are normal.      Palpations: Abdomen is soft. Tenderness: There is no abdominal tenderness. Musculoskeletal:      Cervical back: Neck supple. Skin:     General: Skin is warm and dry. Neurological:      Mental Status: He is alert and oriented to person, place, and time. EKG: Normal sinus rhythm, left axis deviation, right bundle branch block, occasional atrial premature contractions, no ST or T wave abnormalities cncerning for ischemia. Compared to his previous EKG, PVCs are no longer present. ASSESSMENT and PLAN    Right bundle branch block. This is unchanged compared to his previous EKGs. Atrial premature contractions. Patient is asymptomatic to the ectopy. He was previously on a beta-blocker but that has since been discontinued. He has not noted any increase symptoms since stopping his beta-blocker. Dyslipidemia. Patient is tolerating low-dose Crestor at 5 mg daily. His most recent lipid panel from June 2022 demonstrated total cholesterol 218, HDL 82, . This is followed by his PCP. Essential tremor. Patient was initially started on a nonselective beta-blocker for this by his neurologist.  This has since been switched over to Topamax. No need for a beta-blocker from a cardiac standpoint at this point. Followup in 12 months, sooner if needed.

## 2022-06-22 ENCOUNTER — OFFICE VISIT (OUTPATIENT)
Dept: FAMILY MEDICINE CLINIC | Age: 67
End: 2022-06-22
Payer: MEDICARE

## 2022-06-22 VITALS
OXYGEN SATURATION: 98 % | BODY MASS INDEX: 26.67 KG/M2 | WEIGHT: 219 LBS | HEIGHT: 76 IN | HEART RATE: 77 BPM | SYSTOLIC BLOOD PRESSURE: 110 MMHG | DIASTOLIC BLOOD PRESSURE: 72 MMHG | RESPIRATION RATE: 14 BRPM | TEMPERATURE: 98.4 F

## 2022-06-22 DIAGNOSIS — R73.03 PREDIABETES: ICD-10-CM

## 2022-06-22 DIAGNOSIS — N52.9 ERECTILE DYSFUNCTION, UNSPECIFIED ERECTILE DYSFUNCTION TYPE: ICD-10-CM

## 2022-06-22 DIAGNOSIS — I35.1 AORTIC VALVE INSUFFICIENCY, ETIOLOGY OF CARDIAC VALVE DISEASE UNSPECIFIED: ICD-10-CM

## 2022-06-22 DIAGNOSIS — E53.8 VITAMIN B12 DEFICIENCY: ICD-10-CM

## 2022-06-22 DIAGNOSIS — R73.01 IFG (IMPAIRED FASTING GLUCOSE): Primary | ICD-10-CM

## 2022-06-22 DIAGNOSIS — R91.8 PULMONARY NODULES: ICD-10-CM

## 2022-06-22 DIAGNOSIS — I49.1 PAC (PREMATURE ATRIAL CONTRACTION): ICD-10-CM

## 2022-06-22 DIAGNOSIS — I45.10 RBBB: ICD-10-CM

## 2022-06-22 DIAGNOSIS — E78.00 HYPERCHOLESTEROLEMIA: ICD-10-CM

## 2022-06-22 DIAGNOSIS — G25.0 BENIGN ESSENTIAL TREMOR: ICD-10-CM

## 2022-06-22 PROCEDURE — G8427 DOCREV CUR MEDS BY ELIG CLIN: HCPCS | Performed by: FAMILY MEDICINE

## 2022-06-22 PROCEDURE — 1123F ACP DISCUSS/DSCN MKR DOCD: CPT | Performed by: FAMILY MEDICINE

## 2022-06-22 PROCEDURE — 3017F COLORECTAL CA SCREEN DOC REV: CPT | Performed by: FAMILY MEDICINE

## 2022-06-22 PROCEDURE — G8536 NO DOC ELDER MAL SCRN: HCPCS | Performed by: FAMILY MEDICINE

## 2022-06-22 PROCEDURE — 99214 OFFICE O/P EST MOD 30 MIN: CPT | Performed by: FAMILY MEDICINE

## 2022-06-22 PROCEDURE — G8417 CALC BMI ABV UP PARAM F/U: HCPCS | Performed by: FAMILY MEDICINE

## 2022-06-22 PROCEDURE — G8510 SCR DEP NEG, NO PLAN REQD: HCPCS | Performed by: FAMILY MEDICINE

## 2022-06-22 PROCEDURE — 1101F PT FALLS ASSESS-DOCD LE1/YR: CPT | Performed by: FAMILY MEDICINE

## 2022-06-22 NOTE — PATIENT INSTRUCTIONS
A Healthy Lifestyle: Care Instructions  Your Care Instructions     A healthy lifestyle can help you feel good, stay at a healthy weight, and have plenty of energy for both work and play. A healthy lifestyle is something you can share with your whole family. A healthy lifestyle also can lower your risk for serious health problems, such as high blood pressure, heart disease, and diabetes. You can follow a few steps listed below to improve your health and the health of your family. Follow-up care is a key part of your treatment and safety. Be sure to make and go to all appointments, and call your doctor if you are having problems. It's also a good idea to know your test results and keep a list of the medicines you take. How can you care for yourself at home? · Do not eat too much sugar, fat, or fast foods. You can still have dessert and treats now and then. The goal is moderation. · Start small to improve your eating habits. Pay attention to portion sizes, drink less juice and soda pop, and eat more fruits and vegetables. ? Eat a healthy amount of food. A 3-ounce serving of meat, for example, is about the size of a deck of cards. Fill the rest of your plate with vegetables and whole grains. ? Limit the amount of soda and sports drinks you have every day. Drink more water when you are thirsty. ? Eat plenty of fruits and vegetables every day. Have an apple or some carrot sticks as an afternoon snack instead of a candy bar. Try to have fruits and/or vegetables at every meal.  · Make exercise part of your daily routine. You may want to start with simple activities, such as walking, bicycling, or slow swimming. Try to be active 30 to 60 minutes every day. You do not need to do all 30 to 60 minutes all at once. For example, you can exercise 3 times a day for 10 or 20 minutes.  Moderate exercise is safe for most people, but it is always a good idea to talk to your doctor before starting an exercise program.  · Keep moving. Michelle Ibrahim the lawn, work in the garden, or [employer]. Take the stairs instead of the elevator at work. · If you smoke, quit. People who smoke have an increased risk for heart attack, stroke, cancer, and other lung illnesses. Quitting is hard, but there are ways to boost your chance of quitting tobacco for good. ? Use nicotine gum, patches, or lozenges. ? Ask your doctor about stop-smoking programs and medicines. ? Keep trying. In addition to reducing your risk of diseases in the future, you will notice some benefits soon after you stop using tobacco. If you have shortness of breath or asthma symptoms, they will likely get better within a few weeks after you quit. · Limit how much alcohol you drink. Moderate amounts of alcohol (up to 2 drinks a day for men, 1 drink a day for women) are okay. But drinking too much can lead to liver problems, high blood pressure, and other health problems. Family health  If you have a family, there are many things you can do together to improve your health. · Eat meals together as a family as often as possible. · Eat healthy foods. This includes fruits, vegetables, lean meats and dairy, and whole grains. · Include your family in your fitness plan. Most people think of activities such as jogging or tennis as the way to fitness, but there are many ways you and your family can be more active. Anything that makes you breathe hard and gets your heart pumping is exercise. Here are some tips:  ? Walk to do errands or to take your child to school or the bus.  ? Go for a family bike ride after dinner instead of watching TV. Where can you learn more? Go to http://www.lacey.com/  Enter Q769 in the search box to learn more about \"A Healthy Lifestyle: Care Instructions. \"  Current as of: June 16, 2021               Content Version: 13.2  © 7722-0748 [employer], Incorporated.    Care instructions adapted under license by [employer] (which disclaims liability or warranty for this information). If you have questions about a medical condition or this instruction, always ask your healthcare professional. [address] any warranty or liability for your use of this information.

## 2022-06-22 NOTE — PROGRESS NOTES
1. \"Have you been to the ER, urgent care clinic since your last visit? Hospitalized since your last visit? \" No    2. \"Have you seen or consulted any other health care providers outside of the [address] [place] [address] since your last visit? \" No     3. For patients aged 39-70: Has the patient had a colonoscopy / FIT/ Cologuard? NA - based on age-due 4/2027      If the patient is female:    4. For patients aged 41-77: Has the patient had a mammogram within the past 2 years? NA - based on age or sex      11. For patients aged 21-65: Has the patient had a pap smear?  NA - based on age or sex

## 2022-06-23 NOTE — PROGRESS NOTES
SUBJECTIVE  Chief Complaint   Patient presents with    Results     labs    Cholesterol Problem    Other     low T     No new complaints. Patient presents for follow-up on their prediabetes and hypercholesterolemia. He is seeing cardiology for management of cardiac risk factors. He is on a low dose Crestor 5mg daily. ROS:  History obtained from the patient  · Respiratory: no cough, shortness of breath, or wheezing  · Cardiovascular: no chest pain, palpitations, or dyspnea on exertion  · Gastrointestinal: no abdominal pain, N/V, has occasional pain over umbilicus   · : ED - labs showed mixed picture with testosterone levels    OBJECTIVE  Blood pressure 110/72, pulse 77, temperature 98.4 °F (36.9 °C), temperature source Temporal, resp. rate 14, height 6' 4\" (1.93 m), weight 219 lb (99.3 kg), SpO2 98 %. General:  alert, cooperative, well appearing, in no apparent distress. CV:  The heart sounds are regular in rate and rhythm. There is a normal S1 and S2. There or no murmurs. Lungs: Inspiratory and expiratory efforts are full and unlabored. Lung sounds are clear and equal to auscultation throughout all lung fields without wheezing, rales, or rhonchi. Psych: normal affect. Mood good. Oriented x 3. Judgement and insight intact. Results for Malia Fuentes (MRN [id number]) as of 6/23/2022 07:50   Ref.  Range 6/15/2022 00:00   WBC Latest Ref Range: 3.4 - 10.8 x10E3/uL 5.9   RBC Latest Ref Range: 4.14 - 5.80 x10E6/uL 5.10   HGB Latest Ref Range: 13.0 - 17.7 g/dL 15.1   HCT Latest Ref Range: 37.5 - 51.0 % 48.1   MCV Latest Ref Range: 79 - 97 fL 94   MCH Latest Ref Range: 26.6 - 33.0 pg 29.6   MCHC Latest Ref Range: 31.5 - 35.7 g/dL 31.4 (L)   RDW Latest Ref Range: 11.6 - 15.4 % 14.0   PLATELET Latest Ref Range: 150 - 450 x10E3/uL 155   NEUTROPHILS Latest Ref Range: Not Estab. % 69   Lymphocytes Latest Ref Range: Not Estab. % 12   MONOCYTES Latest Ref Range: Not Estab. % 13 EOSINOPHILS Latest Ref Range: Not Estab. % 6   BASOPHILS Latest Ref Range: Not Estab. % 0   IMMATURE GRANULOCYTES Latest Ref Range: Not Estab. % 0   ABS. NEUTROPHILS Latest Ref Range: 1.4 - 7.0 x10E3/uL 4.1   ABS. IMM. GRANS. Latest Ref Range: 0.0 - 0.1 x10E3/uL 0.0   Abs Lymphocytes Latest Ref Range: 0.7 - 3.1 x10E3/uL 0.7   ABS. MONOCYTES Latest Ref Range: 0.1 - 0.9 x10E3/uL 0.8   ABS. EOSINOPHILS Latest Ref Range: 0.0 - 0.4 x10E3/uL 0.3   ABS. BASOPHILS Latest Ref Range: 0.0 - 0.2 x10E3/uL 0.0   Sodium Latest Ref Range: 134 - 144 mmol/L 141   Potassium Latest Ref Range: 3.5 - 5.2 mmol/L 4.4   Chloride Latest Ref Range: 96 - 106 mmol/L 103   CO2 Latest Ref Range: 20 - 29 mmol/L 22   Glucose Latest Ref Range: 65 - 99 mg/dL 105 (H)   BUN Latest Ref Range: 8 - 27 mg/dL 12   Creatinine Latest Ref Range: 0.76 - 1.27 mg/dL 0.97   BUN/Creatinine ratio Latest Ref Range: 10 - 24  12   Calcium Latest Ref Range: 8.6 - 10.2 mg/dL 9.2   Bilirubin, total Latest Ref Range: 0.0 - 1.2 mg/dL 0.4   Protein, total Latest Ref Range: 6.0 - 8.5 g/dL 7.1   Albumin Latest Ref Range: 3.8 - 4.8 g/dL 4.4   A-G Ratio Latest Ref Range: 1.2 - 2.2  1.6   ALT Latest Ref Range: 0 - 44 IU/L 44   AST Latest Ref Range: 0 - 40 IU/L 39   Alk. phosphatase Latest Ref Range: 44 - 121 IU/L 68   Triglyceride Latest Ref Range: 0 - 149 mg/dL 77   Cholesterol, total Latest Ref Range: 100 - 199 mg/dL 218 (H)   HDL Cholesterol Latest Ref Range: >39 mg/dL 82   VLDL, calculated Latest Ref Range: 5 - 40 mg/dL 13   LDL, calculated Latest Ref Range: 0 - 99 mg/dL 123 (H)   Vitamin B12 Latest Ref Range: 232 - 1,245 pg/mL 1,423 (H)   Hemoglobin A1c, (calculated) Latest Ref Range: 4.8 - 5.6 % 5.6   Prostate Specific Ag Latest Ref Range: 0.0 - 4.0 ng/mL 0.8   TESTOSTERONE, FREE & TOTAL Unknown Rpt (A)   eGFR Latest Ref Range: >59 mL/min/1.73 86       ASSESSMENT / PLAN    ICD-10-CM ICD-9-CM    1. IFG (impaired fasting glucose)  R73.01 790.21    2.  Prediabetes  R73.03 790.29    3. Hypercholesterolemia  E78.00 272.0    4. PAC (premature atrial contraction)  I49.1 427.61    5. RBBB  I45.10 426.4    6. Aortic valve insufficiency, etiology of cardiac valve disease unspecified  I35.1 424.1    7. Benign essential tremor  G25.0 333.1    8. Vitamin B12 deficiency  E53.8 266.2    9. Erectile dysfunction, unspecified erectile dysfunction type  N52.9 607.84    10. Pulmonary nodules  R91.8 793.19 CT CHEST WO CONT     Prediabetes / IFG - Cont to monitor A1c. Current levels normal.  Continue healthy living habits. Hypercholesterolemia - Cont with Crestor. LFTs and lipids reviewed. No hepatotoxicity in the past.  Cont to monitor. PAC / RBBB/ aortic valve insufficiency - cont per cardiology. Notes reviewed. Benign essential tremor - cont per neurology. Currently doing well on Topamax. Weight loss noted. Vit B12 def -cont vit B12 1,000mcg SL daily. ED - refills of Cialis as needed. Currently does not desire this med. Reviewed testosterone levels. Consider recheck next year. Pulmonary nodule - CT chest repeat next year. All chart history elements were reviewed by me at the time of the visit even though marked at time of note closure. Patient understands our medical plan. Patient has provided input and agrees with goals. Alternatives have been explained and offered. All questions answered. The patient is to call if condition worsens or fails to improve.      Follow-up and Dispositions    · Return in about 5 months (around 11/22/2022) for routine care and Medicare Wellness exam.

## 2022-08-03 RX ORDER — ROSUVASTATIN CALCIUM 5 MG/1
TABLET, COATED ORAL
Qty: 90 TABLET | Refills: 3 | Status: SHIPPED | OUTPATIENT
Start: 2022-08-03

## 2022-11-14 ENCOUNTER — HOSPITAL ENCOUNTER (OUTPATIENT)
Dept: GENERAL RADIOLOGY | Age: 67
Discharge: HOME | End: 2022-11-14
Payer: MEDICARE

## 2022-11-14 ENCOUNTER — HOSPITAL ENCOUNTER (OUTPATIENT)
Dept: LAB | Age: 67
Discharge: HOME | End: 2022-11-14

## 2022-11-14 DIAGNOSIS — Z01.811 PRE-OP CHEST EXAM: ICD-10-CM

## 2022-11-14 LAB — XX-LABCORP SPECIMEN COL,LCBCF: NORMAL

## 2022-11-14 PROCEDURE — 71046 X-RAY EXAM CHEST 2 VIEWS: CPT

## 2022-11-14 PROCEDURE — 99001 SPECIMEN HANDLING PT-LAB: CPT

## 2022-11-15 LAB
CREATININE, EXTERNAL: 1.1
INR, EXTERNAL: 1
PT, EXTERNAL: 10.4

## 2022-11-19 PROBLEM — F41.1 GAD (GENERALIZED ANXIETY DISORDER): Status: ACTIVE | Noted: 2022-11-19

## 2022-11-19 NOTE — PROGRESS NOTES
Chief Complaint   Patient presents with    Cholesterol Problem    Erectile Dysfunction    Vitamin B12 Deficiency    Other     Hx of pulmonary nodules and IFG          1. \"Have you been to the ER, urgent care clinic since your last visit? Hospitalized since your last visit? \" No    2. \"Have you seen or consulted any other health care providers outside of the [address] [place] [address] since your last visit? \" Yes [address] [place] [address] for acquired ankle pain and right foot pain      3. For patients aged 39-70: Has the patient had a colonoscopy / FIT/ Cologuard? Yes - no Care Gap present      If the patient is female:    4. For patients aged 41-77: Has the patient had a mammogram within the past 2 years? NA - based on age or sex      11. For patients aged 21-65: Has the patient had a pap smear?  NA - based on age or sex

## 2022-11-22 ENCOUNTER — OFFICE VISIT (OUTPATIENT)
Dept: FAMILY MEDICINE CLINIC | Age: 67
End: 2022-11-22

## 2022-11-22 ENCOUNTER — OFFICE VISIT (OUTPATIENT)
Dept: FAMILY MEDICINE CLINIC | Age: 67
End: 2022-11-22
Payer: MEDICARE

## 2022-11-22 VITALS
HEIGHT: 76 IN | BODY MASS INDEX: 26.55 KG/M2 | OXYGEN SATURATION: 98 % | TEMPERATURE: 98.4 F | HEART RATE: 77 BPM | DIASTOLIC BLOOD PRESSURE: 68 MMHG | RESPIRATION RATE: 16 BRPM | SYSTOLIC BLOOD PRESSURE: 116 MMHG | WEIGHT: 218 LBS

## 2022-11-22 VITALS
SYSTOLIC BLOOD PRESSURE: 116 MMHG | RESPIRATION RATE: 16 BRPM | BODY MASS INDEX: 26.55 KG/M2 | HEART RATE: 77 BPM | TEMPERATURE: 98.4 F | OXYGEN SATURATION: 98 % | HEIGHT: 76 IN | WEIGHT: 218 LBS | DIASTOLIC BLOOD PRESSURE: 68 MMHG

## 2022-11-22 DIAGNOSIS — G25.0 BENIGN ESSENTIAL TREMOR: ICD-10-CM

## 2022-11-22 DIAGNOSIS — R73.01 IFG (IMPAIRED FASTING GLUCOSE): ICD-10-CM

## 2022-11-22 DIAGNOSIS — Z12.5 PROSTATE CANCER SCREENING: ICD-10-CM

## 2022-11-22 DIAGNOSIS — Z71.89 ADVANCED DIRECTIVES, COUNSELING/DISCUSSION: ICD-10-CM

## 2022-11-22 DIAGNOSIS — N52.9 ERECTILE DYSFUNCTION, UNSPECIFIED ERECTILE DYSFUNCTION TYPE: ICD-10-CM

## 2022-11-22 DIAGNOSIS — I49.1 PAC (PREMATURE ATRIAL CONTRACTION): ICD-10-CM

## 2022-11-22 DIAGNOSIS — E53.8 VITAMIN B12 DEFICIENCY: ICD-10-CM

## 2022-11-22 DIAGNOSIS — R73.03 PREDIABETES: Primary | ICD-10-CM

## 2022-11-22 DIAGNOSIS — I45.10 RBBB: ICD-10-CM

## 2022-11-22 DIAGNOSIS — R91.8 PULMONARY NODULES: ICD-10-CM

## 2022-11-22 DIAGNOSIS — E78.00 HYPERCHOLESTEROLEMIA: ICD-10-CM

## 2022-11-22 DIAGNOSIS — Z00.00 MEDICARE ANNUAL WELLNESS VISIT, SUBSEQUENT: Primary | ICD-10-CM

## 2022-11-22 DIAGNOSIS — M19.071 ARTHRITIS OF RIGHT ANKLE: ICD-10-CM

## 2022-11-22 DIAGNOSIS — I35.1 AORTIC VALVE INSUFFICIENCY, ETIOLOGY OF CARDIAC VALVE DISEASE UNSPECIFIED: ICD-10-CM

## 2022-11-22 PROCEDURE — 1101F PT FALLS ASSESS-DOCD LE1/YR: CPT | Performed by: FAMILY MEDICINE

## 2022-11-22 PROCEDURE — G8427 DOCREV CUR MEDS BY ELIG CLIN: HCPCS | Performed by: FAMILY MEDICINE

## 2022-11-22 PROCEDURE — G8417 CALC BMI ABV UP PARAM F/U: HCPCS | Performed by: FAMILY MEDICINE

## 2022-11-22 PROCEDURE — 3017F COLORECTAL CA SCREEN DOC REV: CPT | Performed by: FAMILY MEDICINE

## 2022-11-22 PROCEDURE — 99214 OFFICE O/P EST MOD 30 MIN: CPT | Performed by: FAMILY MEDICINE

## 2022-11-22 PROCEDURE — G8510 SCR DEP NEG, NO PLAN REQD: HCPCS | Performed by: FAMILY MEDICINE

## 2022-11-22 PROCEDURE — G0439 PPPS, SUBSEQ VISIT: HCPCS | Performed by: FAMILY MEDICINE

## 2022-11-22 PROCEDURE — G8536 NO DOC ELDER MAL SCRN: HCPCS | Performed by: FAMILY MEDICINE

## 2022-11-22 PROCEDURE — 1123F ACP DISCUSS/DSCN MKR DOCD: CPT | Performed by: FAMILY MEDICINE

## 2022-11-22 NOTE — PROGRESS NOTES
Chief Complaint   Patient presents with    Annual Wellness Visit     This is a Kyung-Anselmo Exam (AWV)     I have reviewed the patient's medical history in detail and updated the computerized patient record. Assessment/Plan   Education and counseling provided:  Are appropriate based on today's review and evaluation    1. Medicare annual wellness visit, subsequent  2. Advanced directives, counseling/discussion  -     ADVANCE CARE PLANNING FIRST 27 MINS     Age and sex specific counseling. Screening for depression and alcoholism. Advanced directives / end of life planning reviewed in chart. HCDM is elected. No ACP on file. Care team updated. Medicare recommended screening and prevention test table is filled out, reviewed, and provided to patient. Screening and prevention is discussed. Patient understands our medical plan. Patient has provided input and agrees with goals. All questions answered. Depression Risk Factor Screening     3 most recent PHQ Screens 11/22/2022   Little interest or pleasure in doing things Not at all   Feeling down, depressed, irritable, or hopeless Not at all   Total Score PHQ 2 0       Alcohol Risk Screen   Do you average more than 1 drink per night or more than 7 drinks a week?: (P) No  In the past three months have you had more than 4 drinks containing alcohol on one occasion?: (P) No        Functional Ability and Level of Safety   Hearing:  Hearing: (P) Patient wears hearing aid     Activities of Daily Living: The home contains: (P) no safety equipment  Functional ADLs: (P) Patient does total self care    Ambulation:  Patient ambulates: (P) with no difficulty      Fall Risk:  Fall Risk Assessment, last 12 mths 11/22/2022   Able to walk? Yes   Fall in past 12 months? 0   Do you feel unsteady? 0   Are you worried about falling 0   Is TUG test greater than 12 seconds?  -   Is the gait abnormal? -     Abuse Screen:  Do you ever feel afraid of your partner?: (P) No  Are you in a relationship with someone who physically or mentally threatens you?: (P) No  Is it safe for you to go home?: (P) Yes Abuse Screen:         Cognitive Screening   Has your family/caregiver stated any concerns about your memory?: (P) No    Health Maintenance Due     Health Maintenance Due   Topic Date Due    DTaP/Tdap/Td series (2 - Td or Tdap) 06/29/2021    COVID-19 Vaccine (4 - Booster for Aldona Raid series) 12/18/2021       Patient Care Team   Patient Care Team:  Morales Venegas MD as PCP - General (Family Medicine)  Morales Venegas MD as PCP - [facility]  Martin Curry MD (Neurosurgery)  Meghan Schwartz MD (Cardiovascular Disease Physician)  Sheldon Shah MD (General Surgery)  Reji Stephen MD (Dermatology Physician)  Elias Everett MD (Psychiatry)  [place], [place] (Physician Assistant)  Avani Gipson MD (Gastroenterology)  Nathalie Esquivel MD (Orthopedic Surgery)  Grant Rosario MD as Surgeon (Surgery Physician)  Siri Reid MD as Surgeon (General Surgery)  Eleni Strong MD (Orthopedic Surgery)    History     Patient Active Problem List   Diagnosis Code    Hypercholesterolemia E78.00    PAC (premature atrial contraction) I49.1    Family history of abdominal aortic aneurysm (AAA) Z82.49    S/P rotator cuff repair Z98.890    RBBB I45.10    Aortic valve insufficiency I35.1    Prediabetes R73.03    Benign essential tremor G25.0    Ventral hernia K43.9    Primary osteoarthritis of right knee M17.11    Lumbar herniated disc V28.44    Umbilical hernia without obstruction and without gangrene N08.6    Supraumbilical hernia without gangrene and without obstruction K43.9    KATHE (generalized anxiety disorder) F41.1     Past Medical History:   Diagnosis Date    AAA (abdominal aortic aneurysm)     saw vascular and notes indicate they did not beleive he had one and the reading was poor.  was told no follow-up needed    Anxiety     Benign essential tremor Cardiac echocardiogram 03/04/2016    EF 55-60%. No RWMA. Gr 1 DDfx. Mild AI. No significant valvular heart disease. Cardiac exercise stress test 08/01/2014    Neg EKG on maximal treadmill stress test.  One 6-beat episode of NSVT. Ex time 6 min. Cardiovascular aorto-iliac duplex 03/12/2015    No AAA. Patent bilateral CIAs. Patent renal arteries. Hearing loss     left ear    Hemorrhoids     Hypercholesterolemia     has not tolerated some statins in the past    Inguinal hernia, right     Lumbar herniated disc     L4-5, Dr. Garcia Robinson    Lung nodule 06/2022    PAC (premature atrial contraction)     Prediabetes     RBBB     S/P colonoscopy 1/29/07    Dr. Tu Lopes, no polyps    S/P colonoscopy 04/05/2017    Moderate diverticulosis of the sigmoid colon / repeat in 10 years per report      Past Surgical History:   Procedure Laterality Date    HX BACK SURGERY  7/21/2011    Dr. Larissa Hill  11/12    right inguinal    HX KNEE REPLACEMENT Right 09/07/2016    HX KNEE REPLACEMENT Left 04/21/2021    HX SHOULDER ARTHROSCOPY Left 07/20/2015    Dr. Corky Garcia / Rotator Cuff Repair    HX TONSILLECTOMY  38VB    LAP UMBILICAL HERNIA REPAIR  02/17/2022     Current Outpatient Medications   Medication Sig Dispense Refill    rosuvastatin (CRESTOR) 5 mg tablet TAKE 1 TABLET NIGHTLY 90 Tablet 3    topiramate (TOPAMAX) 50 mg tablet Take 50 mg by mouth two (2) times a day. cyanocobalamin (VITAMIN B-12) 1,000 mcg sublingual tablet Take 1 Tab by mouth daily. 90 Tab 3    venlafaxine-SR (EFFEXOR-XR) 150 mg capsule Take 150 mg by mouth daily.  Indications: ANXIETY WITH DEPRESSION       Allergies   Allergen Reactions    Simvastatin Myalgia       Family History   Problem Relation Age of Onset    Cancer Mother         pancreatic    Alcohol abuse Father     Cancer Father 79        lung    Coronary Art Dis Father         46s    Emphysema Father     Heart Failure Father     Other Father         AAA    Cancer Brother 61 bladder, smoker    Cancer Brother 65        throat cancer    Other Brother         AAA    Coronary Art Dis Brother 77    Heart Attack Maternal Grandmother      Social History     Tobacco Use    Smoking status: Never    Smokeless tobacco: Never   Substance Use Topics    Alcohol use:  Yes     Alcohol/week: 2.0 standard drinks     Types: 2 Cans of beer per week     Comment: 2 beers daily       Bettie Blankenship MD

## 2022-11-22 NOTE — PATIENT INSTRUCTIONS
Medicare Wellness Visit, Male    The best way to live healthy is to have a lifestyle where you eat a well-balanced diet, exercise regularly, limit alcohol use, and quit all forms of tobacco/nicotine, if applicable. Regular preventive services are another way to keep healthy. Preventive services (vaccines, screening tests, monitoring & exams) can help personalize your care plan, which helps you manage your own care. Screening tests can find health problems at the earliest stages, when they are easiest to treat. [facility] follows the current, evidence-based guidelines published by the [facility] ([facility]) when recommending preventive services for our patients. Because we follow these guidelines, sometimes recommendations change over time as research supports it. (For example, a prostate screening blood test is no longer routinely recommended for men with no symptoms). Of course, you and your doctor may decide to screen more often for some diseases, based on your risk and co-morbidities (chronic disease you are already diagnosed with). Preventive services for you include:  - Medicare offers their members a free annual wellness visit, which is time for you and your primary care provider to discuss and plan for your preventive service needs. Take advantage of this benefit every year!  -All adults over age 72 should receive the recommended pneumonia vaccines. Current USPSTF guidelines recommend a series of two vaccines for the best pneumonia protection.   -All adults should have a flu vaccine yearly and tetanus vaccine every 10 years.  -All adults age 48 and older should receive the shingles vaccines (series of two vaccines).        -All adults age 38-68 who are overweight should have a diabetes screening test once every three years.   -Other screening tests & preventive services for persons with diabetes include: an eye exam to screen for diabetic retinopathy, a kidney function test, a foot exam, and stricter control over your cholesterol.   -Cardiovascular screening for adults with routine risk involves an electrocardiogram (ECG) at intervals determined by the provider.   -Colorectal cancer screening should be done for adults age 54-65 with no increased risk factors for colorectal cancer. There are a number of acceptable methods of screening for this type of cancer. Each test has its own benefits and drawbacks. Discuss with your provider what is most appropriate for you during your annual wellness visit. The different tests include: colonoscopy (considered the best screening method), a fecal occult blood test, a fecal DNA test, and sigmoidoscopy.  -All adults born between [facility] should be screened once for Hepatitis C.  -An Abdominal Aortic Aneurysm (AAA) Screening is recommended for men age 73-68 who has ever smoked in their lifetime.      Here is a list of your current Health Maintenance items (your personalized list of preventive services) with a due date:  Health Maintenance Due   Topic Date Due    DTaP/Tdap/Td  (2 - Td or Tdap) 06/29/2021    COVID-19 Vaccine (4 - Booster for Moderna series) 12/18/2021

## 2022-11-22 NOTE — PROGRESS NOTES
SUBJECTIVE  Chief Complaint   Patient presents with    Cholesterol Problem    Erectile Dysfunction    Vitamin B12 Deficiency    Other     Hx of pulmonary nodules and IFG    Pre-op Exam     Having right ankle fusion for arthritis in early-to-mid Dec with Dr. Satish Hinds of [facility]. He has no complaints other than chronic instability, deformity, and pain of that ankle attributed to years of athletics particularly basketball. Has had a normal CXR and labs for pre-op testing. Patient presents for follow-up on their prediabetes and hypercholesterolemia. He is seeing cardiology for management of cardiac risk factors. He is on a low dose Crestor 5mg daily. He is seeing cardiology for pre-op clearance on Dec 1st.      ROS:  History obtained from the patient  Respiratory: no cough, shortness of breath, or wheezing  Cardiovascular: no chest pain, palpitations, or dyspnea on exertion  Gastrointestinal: no abdominal pain, N/V, has occasional pain over umbilicus   : ED - labs showed mixed picture with testosterone levels    OBJECTIVE  Blood pressure 116/68, pulse 77, temperature 98.4 °F (36.9 °C), temperature source Temporal, resp. rate 16, height 6' 4\" (1.93 m), weight 218 lb (98.9 kg), SpO2 98 %. General:  alert, cooperative, well appearing, in no apparent distress. HEENT:  no oral lesions or abnormalities. Neck: no masses. No thyroid abnormalities. CV:  The heart sounds are regular in rate and rhythm. There is a normal S1 and S2. There or no murmurs. Lungs: Inspiratory and expiratory efforts are full and unlabored. Lung sounds are clear and equal to auscultation throughout all lung fields without wheezing, rales, or rhonchi. Abd: soft, nontender. Nondistended. Psych: normal affect. Mood good. Oriented x 3. Judgement and insight intact. Labs in media tab. ASSESSMENT / PLAN    ICD-10-CM ICD-9-CM    1. Prediabetes  R73.03 790.29       2.  IFG (impaired fasting glucose)  R73.01 790.21 HEMOGLOBIN A1C W/O EAG      3. Hypercholesterolemia  E78.00 272.0 CBC WITH AUTOMATED DIFF      METABOLIC PANEL, COMPREHENSIVE      LIPID PANEL      4. PAC (premature atrial contraction)  I49.1 427.61       5. RBBB  I45.10 426.4       6. Aortic valve insufficiency, etiology of cardiac valve disease unspecified  I35.1 424.1       7. Benign essential tremor  G25.0 333.1       8. Vitamin B12 deficiency  E53.8 266.2 VITAMIN B12      9. Erectile dysfunction, unspecified erectile dysfunction type  N52.9 607.84       10. Pulmonary nodules  R91.8 793.19       11. Arthritis of right ankle  M19.071 716.97       12. Prostate cancer screening  Z12.5 V76.44 PSA SCREENING (SCREENING)        Prediabetes / IFG - Cont to monitor A1c annually. Last levels normal.  Continue healthy living habits. Hypercholesterolemia - Cont with Crestor. LFTs and lipids reviewed. No hepatotoxicity in the past.  Cont to monitor. PAC / RBBB/ aortic valve insufficiency - cont per cardiology. Notes reviewed. Benign essential tremor - cont per neurology. Currently doing well on Topamax. Vit B12 def -cont vit B12 1,000mcg SL daily. Check levels next set of labs. ED - refills of Cialis as needed. Pulmonary nodule - reviewed CT. Discussed with patient. No need to repeat. Right ankle arthritis - cont per ortho. Cleared for surgery pending cardiology clearance. All chart history elements were reviewed by me at the time of the visit even though marked at time of note closure. Patient understands our medical plan. Patient has provided input and agrees with goals. Alternatives have been explained and offered. All questions answered. The patient is to call if condition worsens or fails to improve. Follow-up and Dispositions    Return in about 6 months (around 5/22/2023) for routine care and fasting labs to be completed 3-7 days prior .

## 2022-11-22 NOTE — PATIENT INSTRUCTIONS

## 2022-11-22 NOTE — PROGRESS NOTES
Chief Complaint   Patient presents with    Annual Wellness Visit     Abuse Screening Questionnaire 11/22/2022   Do you ever feel afraid of your partner? N   Are you in a relationship with someone who physically or mentally threatens you? N   Is it safe for you to go home? Y     Fall Risk Assessment, last 12 mths 11/22/2022   Able to walk? Yes   Fall in past 12 months? 0   Do you feel unsteady? 0   Are you worried about falling 0   Is TUG test greater than 12 seconds?  -   Is the gait abnormal? -     3 most recent PHQ Screens 11/22/2022   Little interest or pleasure in doing things Not at all   Feeling down, depressed, irritable, or hopeless Not at all   Total Score PHQ 2 0

## 2022-11-22 NOTE — ACP (ADVANCE CARE PLANNING)
Advance Care Planning     General Advance Care Planning (ACP) Conversation      Date of Conversation: 11/22/2022  Conducted with: Patient with [address] Decision Maker:     Primary Decision Maker: Bang Mora - [phone]    Today we documented Decision Maker(s) consistent with Legal Next of Kin hierarchy. Content/Action Overview:   Has NO ACP documents/care preferences - information provided, considering goals and options  Reviewed DNR/DNI and patient elects Full Code (Attempt Resuscitation)    Advised to bring ACP to office for scanning.     Length of Voluntary ACP Conversation in minutes:  <16 minutes (Non-Billable)    Debra Gee MD
21F here for medication refill. Was seen yesterday for the same, returns today because she was unable to fill them. Denies SI/HI/AH/VH.

## 2022-12-01 ENCOUNTER — OFFICE VISIT (OUTPATIENT)
Dept: CARDIOLOGY CLINIC | Age: 67
End: 2022-12-01
Payer: MEDICARE

## 2022-12-01 VITALS
HEIGHT: 76 IN | SYSTOLIC BLOOD PRESSURE: 130 MMHG | HEART RATE: 62 BPM | OXYGEN SATURATION: 98 % | WEIGHT: 220 LBS | DIASTOLIC BLOOD PRESSURE: 88 MMHG | BODY MASS INDEX: 26.79 KG/M2

## 2022-12-01 DIAGNOSIS — E78.00 HYPERCHOLESTEROLEMIA: ICD-10-CM

## 2022-12-01 DIAGNOSIS — I45.10 RBBB: Primary | ICD-10-CM

## 2022-12-01 DIAGNOSIS — I49.1 PAC (PREMATURE ATRIAL CONTRACTION): ICD-10-CM

## 2022-12-01 NOTE — LETTER
12/1/2022 2:11 PM    Mr. Guero Banks  [address] [address]    Attn: 8 Catrina Encarnacion     The above named patient was seen by this office on 12/01/22 for cardiac evaluation. This patient is low risk for surgery from a cardiac standpoint. Please contact this office if you have any further questions.            Sincerely,      Mg Rodriguez MD

## 2022-12-01 NOTE — PROGRESS NOTES
HISTORY OF PRESENT ILLNESS  Nidia Rose is a 79 y.o. male. Follow-up  Pertinent negatives include no chest pain, no abdominal pain, no headaches and no shortness of breath. Patient presents for an add-on office visit. He has a past medical history significant for dyslipidemia and atrial premature contractions. He was discovered to have a small abdominal aortic aneurysm measuring 2.5 cm in diameter. He also discovered that he has a family history for premature coronary disease. He underwent a regular exercise treadmill stress test in August 2014 which was negative at 6 minutes total exercise time. He also underwent an echocardiogram in March 2016 which showed preserved LV systolic function, EF 23-35% with grade 1 diastolic dysfunction and mild aortic valve insufficiency. The patient was last seen in the office approximately 6 months ago. He returns today primarily for preoperative cardiac evaluation prior to undergoing fusion of his ankle. He has not noted any new chest pain or shortness of breath. No major change in his activity tolerance. No leg swelling, no orthopnea, no PND. Past Medical History:   Diagnosis Date    AAA (abdominal aortic aneurysm)     saw vascular and notes indicate they did not beleive he had one and the reading was poor. was told no follow-up needed    Anxiety     Benign essential tremor     Cardiac echocardiogram 03/04/2016    EF 55-60%. No RWMA. Gr 1 DDfx. Mild AI. No significant valvular heart disease. Cardiac exercise stress test 08/01/2014    Neg EKG on maximal treadmill stress test.  One 6-beat episode of NSVT. Ex time 6 min. Cardiovascular aorto-iliac duplex 03/12/2015    No AAA. Patent bilateral CIAs. Patent renal arteries.     Hearing loss     left ear    Hemorrhoids     Hypercholesterolemia     has not tolerated some statins in the past    Inguinal hernia, right     Lumbar herniated disc     L4-5, Dr. Cedeno Rhody    Lung nodule 06/2022    PAC (premature atrial contraction)     Prediabetes     RBBB     S/P colonoscopy 1/29/07    Dr. Ashley Aguilar, no polyps    S/P colonoscopy 04/05/2017    Moderate diverticulosis of the sigmoid colon / repeat in 10 years per report      Current Outpatient Medications   Medication Sig Dispense Refill    rosuvastatin (CRESTOR) 5 mg tablet TAKE 1 TABLET NIGHTLY 90 Tablet 3    topiramate (TOPAMAX) 50 mg tablet Take 50 mg by mouth two (2) times a day. cyanocobalamin (VITAMIN B-12) 1,000 mcg sublingual tablet Take 1 Tab by mouth daily. 90 Tab 3    venlafaxine-SR (EFFEXOR-XR) 150 mg capsule Take 150 mg by mouth daily. Indications: ANXIETY WITH DEPRESSION         Allergies   Allergen Reactions    Simvastatin Myalgia        Social History     Tobacco Use    Smoking status: Never    Smokeless tobacco: Never   Vaping Use    Vaping Use: Never used   Substance Use Topics    Alcohol use: Yes     Alcohol/week: 2.0 standard drinks     Types: 2 Cans of beer per week     Comment: 2 beers daily    Drug use: No          Review of Systems   Constitutional:  Negative for chills, fever and weight loss. HENT:  Negative for nosebleeds. Eyes:  Negative for blurred vision and double vision. Respiratory:  Negative for cough, shortness of breath and wheezing. Cardiovascular:  Negative for chest pain, palpitations, orthopnea, claudication, leg swelling and PND. Gastrointestinal:  Negative for abdominal pain, heartburn, nausea and vomiting. Genitourinary:  Negative for dysuria and hematuria. Musculoskeletal:  Negative for falls, joint pain and myalgias. Skin:  Negative for rash. Neurological:  Negative for dizziness, focal weakness and headaches. Endo/Heme/Allergies:  Does not bruise/bleed easily. Psychiatric/Behavioral:  Negative for substance abuse.     Visit Vitals  /88 (BP 1 Location: Left upper arm, BP Patient Position: Sitting, BP Cuff Size: Adult)   Pulse 62   Ht 6' 4\" (1.93 m)   Wt 99.8 kg (220 lb)   SpO2 98%   BMI 26.78 kg/m²      Physical Exam  Constitutional:       Appearance: He is well-developed. HENT:      Head: Normocephalic and atraumatic. Eyes:      Conjunctiva/sclera: Conjunctivae normal.   Neck:      Vascular: No carotid bruit or JVD. Cardiovascular:      Rate and Rhythm: Normal rate and regular rhythm. No extrasystoles are present. Pulses: Normal pulses. Heart sounds: S1 normal and S2 normal. No murmur heard. No gallop. No S3 sounds. Pulmonary:      Breath sounds: Normal breath sounds. No wheezing or rales. Abdominal:      General: Bowel sounds are normal.      Palpations: Abdomen is soft. Tenderness: There is no abdominal tenderness. Musculoskeletal:      Cervical back: Neck supple. Skin:     General: Skin is warm and dry. Neurological:      Mental Status: He is alert and oriented to person, place, and time. EKG: Normal sinus rhythm, normal axis, right bundle branch block, o, no ST or T wave abnormalities cncerning for ischemia. Compared to his previous EKG, PACs are no longer present. Axis has shifted to the right. ASSESSMENT and PLAN    Low risk from a cardiac standpoint to proceed with orthopedic as scheduled later this month. No additional cardiac testing needed. Right bundle branch block. This is unchanged compared to his previous EKGs. Atrial premature contractions. Patient is asymptomatic to the ectopy. He was previously on a beta-blocker but that has since been discontinued. He has not noted any increase symptoms since stopping his beta-blocker. PACs are now present on today's EKG. Dyslipidemia. Patient is tolerating low-dose Crestor at 5 mg daily. His most recent lipid panel from June 2022 demonstrated total cholesterol 218, HDL 82, . This is followed by his PCP. Followup in 12 months, sooner if needed.

## 2022-12-01 NOTE — LETTER
12/1/2022 2:11 PM    Mr. Marshal Will  [address] [address]    Attn: 8 Catrina Encarnacion     The above named patient was seen by this office on 12/01/22 for cardiac evaluation. This patient is low risk for surgery from a cardiac standpoint. Please contact this office if you have any further questions.            Sincerely,      Moises Flood MD

## 2022-12-01 NOTE — PROGRESS NOTES
Meenakshi Giles presents today for   Chief Complaint   Patient presents with    Follow-up     Add o:  SC    Surgical Clearance     12/16/22: Fusion, joint, hindfoot, triple [place] Dr. Tony Colon at [address] preferred language for health care discussion is english/other. Is someone accompanying this pt? no    Is the patient using any DME equipment during [address]? no    Depression Screening:  3 most recent PHQ Screens 12/1/2022   Little interest or pleasure in doing things Not at all   Feeling down, depressed, irritable, or hopeless Not at all   Total Score PHQ 2 0       Learning Assessment:  Learning Assessment 12/1/2022   PRIMARY LEARNER Patient   HIGHEST LEVEL OF EDUCATION - PRIMARY LEARNER  -   BARRIERS PRIMARY LEARNER -     -   CO-LEARNER CAREGIVER -   PRIMARY LANGUAGE ENGLISH   LEARNER PREFERENCE PRIMARY DEMONSTRATION     -     -     -   ANSWERED BY patient     -   RELATIONSHIP SELF       Abuse Screening:  Abuse Screening Questionnaire 12/1/2022   Do you ever feel afraid of your partner? N   Are you in a relationship with someone who physically or mentally threatens you? N   Is it safe for you to go home? Y       Fall Risk  Fall Risk Assessment, last 12 mths 12/1/2022   Able to walk? Yes   Fall in past 12 months? 0   Do you feel unsteady? 0   Are you worried about falling 0   Is TUG test greater than 12 seconds? -   Is the gait abnormal? -           Pt currently taking Anticoagulant therapy? no    Pt currently taking Antiplatelet therapy ? no      Coordination of Care:  1. Have you been to the ER, urgent care clinic since your last visit? Hospitalized since your last visit? no    2. Have you seen or consulted any other health care providers outside of the [address] [place] [address] since your last visit? Include any pap smears or colon screening.  no

## 2023-01-24 ENCOUNTER — VIRTUAL VISIT (OUTPATIENT)
Dept: FAMILY MEDICINE CLINIC | Age: 68
End: 2023-01-24
Payer: MEDICARE

## 2023-01-24 DIAGNOSIS — G45.3 AMAUROSIS FUGAX, BOTH EYES: ICD-10-CM

## 2023-01-24 DIAGNOSIS — Z82.49 FAMILY HISTORY OF CAROTID ARTERY STENOSIS: ICD-10-CM

## 2023-01-24 DIAGNOSIS — R42 DIZZINESS: Primary | ICD-10-CM

## 2023-01-24 NOTE — PROGRESS NOTES
Roya Miller, who was evaluated through a synchronous (real-time) audio-video encounter, and/or his healthcare decision maker, is aware that it is a billable service, which includes applicable co-pays, with coverage as determined by his insurance carrier. He provided verbal consent to proceed and patient identification was verified. This visit was conducted pursuant to the emergency declaration under the [address] and the [email or web address] and [employer] Act. A caregiver was present when appropriate. Ability to conduct physical exam was limited. The patient was located at: Home: [address] [address]  The provider was located at: Facility ([facility]): [address] [place] [address],Fourth Floor [phone]    --Alexandrea Perez MD on 1/24/2023 at 11:33 AM    Chief Complaint   Patient presents with    Other     Concern about family history of carotid stenosis and TIAs     SUBJECTIVE    Patient presents for concerns about his family history. His older brother is having TIAs and has had to have an endarterectomy for carotid stenosis. The patient has in hindsight had symptoms within the past several years that he did not think twice about but now is. He had loss of vision on one occasion briefly. He has never had that since. He has had episodes of dizziness at times but none recently. OBJECTIVE    General:  Alert, cooperative, well appearing, in no apparent distress. ASSESSMENT / PLAN    ICD-10-CM ICD-9-CM    1. Dizziness  R42 780.4 DUPLEX CAROTID BILATERAL      2. Amaurosis fugax, both eyes  G45.3 362.34 DUPLEX CAROTID BILATERAL      3.  Family history of carotid artery stenosis  Z82.49 V17.49 DUPLEX CAROTID BILATERAL        I think it is very reasonable to perform a carotid ultrasound for reassurance and to work-up his prior symptoms so I have ordered that test to be done at his convenience. We will review the results once they return. All chart history elements were reviewed by me at the time of the visit even though marked at time of note closure. Patient understands our medical plan. Patient has provided input and agrees with goals. Alternatives have been explained and offered. All questions answered. The patient is to call if condition worsens or fails to improve. RTC as scheduled.

## 2023-01-24 NOTE — PROGRESS NOTES
1. \"Have you been to the ER, urgent care clinic since your last visit? Hospitalized since your last visit? \" No    2. \"Have you seen or consulted any other health care providers outside of the [address] [place] [address] since your last visit? \" Yes Where: [facility] for foot pain/surgery      3. For patients aged 39-70: Has the patient had a colonoscopy / FIT/ Cologuard? Yes - no Care Gap present-due 4/2027      If the patient is female:    4. For patients aged 41-77: Has the patient had a mammogram within the past 2 years? NA - based on age or sex      11. For patients aged 21-65: Has the patient had a pap smear?  NA - based on age or sex

## 2023-01-28 ENCOUNTER — TRANSCRIBE ORDERS (OUTPATIENT)
Facility: HOSPITAL | Age: 68
End: 2023-01-28

## 2023-01-28 DIAGNOSIS — G45.3 AMAUROSIS FUGAX, BOTH EYES: ICD-10-CM

## 2023-01-28 DIAGNOSIS — Z82.49 FAMILY HISTORY OF CAROTID ARTERY STENOSIS: Primary | ICD-10-CM

## 2023-01-28 DIAGNOSIS — R42 DIZZINESS: ICD-10-CM

## 2023-01-28 DIAGNOSIS — R91.8 PULMONARY NODULES: Primary | ICD-10-CM

## 2023-01-31 ENCOUNTER — PATIENT MESSAGE (OUTPATIENT)
Dept: FAMILY MEDICINE CLINIC | Age: 68
End: 2023-01-31

## 2023-01-31 DIAGNOSIS — R91.8 PULMONARY NODULES: Primary | ICD-10-CM

## 2023-02-03 DIAGNOSIS — R91.8 PULMONARY NODULES: Primary | ICD-10-CM

## 2023-04-03 ENCOUNTER — HOSPITAL ENCOUNTER (OUTPATIENT)
Facility: HOSPITAL | Age: 68
Discharge: HOME | End: 2023-04-05
Payer: MEDICARE

## 2023-04-03 DIAGNOSIS — G45.3 AMAUROSIS FUGAX, BOTH EYES: ICD-10-CM

## 2023-04-03 DIAGNOSIS — R42 DIZZINESS: ICD-10-CM

## 2023-04-03 DIAGNOSIS — Z82.49 FAMILY HISTORY OF CAROTID ARTERY STENOSIS: ICD-10-CM

## 2023-04-03 LAB
VAS LEFT CCA DIST EDV: 17.6 CM/S
VAS LEFT CCA DIST PSV: 87.5 CM/S
VAS LEFT CCA MID EDV: 17.6 CM/S
VAS LEFT CCA MID PSV: 90.07 CM/S
VAS LEFT CCA PROX EDV: 17.6 CM/S
VAS LEFT CCA PROX PSV: 100.4 CM/S
VAS LEFT ECA EDV: 11.58 CM/S
VAS LEFT ECA PSV: 42.5 CM/S
VAS LEFT ICA DIST EDV: 12.5 CM/S
VAS LEFT ICA DIST PSV: 37.9 CM/S
VAS LEFT ICA MID EDV: 23.2 CM/S
VAS LEFT ICA MID PSV: 72.1 CM/S
VAS LEFT ICA PROX EDV: 18.8 CM/S
VAS LEFT ICA PROX PSV: 65.1 CM/S
VAS LEFT ICA/CCA PSV: 0.82 NO UNITS
VAS LEFT SUBCLAVIAN PROX EDV: 0 CM/S
VAS LEFT SUBCLAVIAN PROX PSV: 75.9 CM/S
VAS LEFT VERTEBRAL EDV: 21.59 CM/S
VAS LEFT VERTEBRAL PSV: 70.1 CM/S
VAS RIGHT CCA DIST EDV: 10.1 CM/S
VAS RIGHT CCA DIST PSV: 59 CM/S
VAS RIGHT CCA MID EDV: 10.99 CM/S
VAS RIGHT CCA MID PSV: 63.34 CM/S
VAS RIGHT CCA PROX EDV: 11 CM/S
VAS RIGHT CCA PROX PSV: 73.8 CM/S
VAS RIGHT ECA EDV: 17.19 CM/S
VAS RIGHT ECA PSV: 97.6 CM/S
VAS RIGHT ICA DIST EDV: 10.9 CM/S
VAS RIGHT ICA DIST PSV: 35.4 CM/S
VAS RIGHT ICA MID EDV: 14.5 CM/S
VAS RIGHT ICA MID PSV: 53.2 CM/S
VAS RIGHT ICA PROX EDV: 16.1 CM/S
VAS RIGHT ICA PROX PSV: 66.8 CM/S
VAS RIGHT ICA/CCA PSV: 1.1 NO UNITS
VAS RIGHT SUBCLAVIAN PROX EDV: 0 CM/S
VAS RIGHT SUBCLAVIAN PROX PSV: 79.2 CM/S
VAS RIGHT VERTEBRAL EDV: 14.98 CM/S
VAS RIGHT VERTEBRAL PSV: 78.9 CM/S

## 2023-04-03 PROCEDURE — 93880 EXTRACRANIAL BILAT STUDY: CPT

## 2023-05-09 ENCOUNTER — HOSPITAL ENCOUNTER (OUTPATIENT)
Facility: HOSPITAL | Age: 68
Discharge: HOME | End: 2023-05-12

## 2023-05-09 LAB — LABCORP SPECIMEN COLLECTION: NORMAL

## 2023-05-09 PROCEDURE — 99001 SPECIMEN HANDLING PT-LAB: CPT

## 2023-05-12 ENCOUNTER — HOSPITAL ENCOUNTER (OUTPATIENT)
Facility: HOSPITAL | Age: 68
Discharge: HOME | End: 2023-05-15

## 2023-05-12 LAB — LABCORP SPECIMEN COLLECTION: NORMAL

## 2023-05-12 PROCEDURE — 99001 SPECIMEN HANDLING PT-LAB: CPT

## 2023-05-13 LAB
ALBUMIN SERPL-MCNC: 4.6 G/DL (ref 3.8–4.8)
ALBUMIN/GLOB SERPL: 1.9 {RATIO} (ref 1.2–2.2)
ALP SERPL-CCNC: 71 IU/L (ref 44–121)
ALT SERPL-CCNC: 36 IU/L (ref 0–44)
AST SERPL-CCNC: 38 IU/L (ref 0–40)
BASOPHILS # BLD AUTO: 0 X10E3/UL (ref 0–0.2)
BASOPHILS NFR BLD AUTO: 1 %
BILIRUB SERPL-MCNC: 0.4 MG/DL (ref 0–1.2)
BUN SERPL-MCNC: 18 MG/DL (ref 8–27)
BUN/CREAT SERPL: 17 (ref 10–24)
CALCIUM SERPL-MCNC: 9.5 MG/DL (ref 8.6–10.2)
CHLORIDE SERPL-SCNC: 106 MMOL/L (ref 96–106)
CHOLEST SERPL-MCNC: 191 MG/DL (ref 100–199)
CO2 SERPL-SCNC: 22 MMOL/L (ref 20–29)
CREAT SERPL-MCNC: 1.03 MG/DL (ref 0.76–1.27)
EGFRCR SERPLBLD CKD-EPI 2021: 79 ML/MIN/1.73
EOSINOPHIL # BLD AUTO: 0.4 X10E3/UL (ref 0–0.4)
EOSINOPHIL NFR BLD AUTO: 8 %
ERYTHROCYTE [DISTWIDTH] IN BLOOD BY AUTOMATED COUNT: 14.1 % (ref 11.6–15.4)
GLOBULIN SER CALC-MCNC: 2.4 G/DL (ref 1.5–4.5)
GLUCOSE SERPL-MCNC: 102 MG/DL (ref 70–99)
HBA1C MFR BLD: 5.7 % (ref 4.8–5.6)
HCT VFR BLD AUTO: 46.9 % (ref 37.5–51)
HDLC SERPL-MCNC: 64 MG/DL
HGB BLD-MCNC: 15.4 G/DL (ref 13–17.7)
IMM GRANULOCYTES # BLD AUTO: 0 X10E3/UL (ref 0–0.1)
IMM GRANULOCYTES NFR BLD AUTO: 0 %
IMP & REVIEW OF LAB RESULTS: NORMAL
LDLC SERPL CALC-MCNC: 118 MG/DL (ref 0–99)
LYMPHOCYTES # BLD AUTO: 1.1 X10E3/UL (ref 0.7–3.1)
LYMPHOCYTES NFR BLD AUTO: 22 %
MCH RBC QN AUTO: 29.9 PG (ref 26.6–33)
MCHC RBC AUTO-ENTMCNC: 32.8 G/DL (ref 31.5–35.7)
MCV RBC AUTO: 91 FL (ref 79–97)
MONOCYTES # BLD AUTO: 0.4 X10E3/UL (ref 0.1–0.9)
MONOCYTES NFR BLD AUTO: 9 %
NEUTROPHILS # BLD AUTO: 3 X10E3/UL (ref 1.4–7)
NEUTROPHILS NFR BLD AUTO: 60 %
PLATELET # BLD AUTO: 199 X10E3/UL (ref 150–450)
POTASSIUM SERPL-SCNC: 5.2 MMOL/L (ref 3.5–5.2)
PROT SERPL-MCNC: 7 G/DL (ref 6–8.5)
PSA SERPL-MCNC: 0.8 NG/ML (ref 0–4)
RBC # BLD AUTO: 5.15 X10E6/UL (ref 4.14–5.8)
SODIUM SERPL-SCNC: 141 MMOL/L (ref 134–144)
TRIGL SERPL-MCNC: 46 MG/DL (ref 0–149)
VIT B12 SERPL-MCNC: >2000 PG/ML (ref 232–1245)
VLDLC SERPL CALC-MCNC: 9 MG/DL (ref 5–40)
WBC # BLD AUTO: 4.9 X10E3/UL (ref 3.4–10.8)

## 2023-05-22 ENCOUNTER — OFFICE VISIT (OUTPATIENT)
Age: 68
End: 2023-05-22
Payer: MEDICARE

## 2023-05-22 VITALS
RESPIRATION RATE: 16 BRPM | BODY MASS INDEX: 27.16 KG/M2 | SYSTOLIC BLOOD PRESSURE: 110 MMHG | OXYGEN SATURATION: 100 % | DIASTOLIC BLOOD PRESSURE: 84 MMHG | HEIGHT: 76 IN | HEART RATE: 83 BPM | WEIGHT: 223 LBS | TEMPERATURE: 98.7 F

## 2023-05-22 DIAGNOSIS — E53.8 DEFICIENCY OF OTHER SPECIFIED B GROUP VITAMINS: ICD-10-CM

## 2023-05-22 DIAGNOSIS — E78.00 PURE HYPERCHOLESTEROLEMIA, UNSPECIFIED: ICD-10-CM

## 2023-05-22 DIAGNOSIS — R73.01 IMPAIRED FASTING GLUCOSE: ICD-10-CM

## 2023-05-22 DIAGNOSIS — N52.9 ERECTILE DYSFUNCTION, UNSPECIFIED ERECTILE DYSFUNCTION TYPE: ICD-10-CM

## 2023-05-22 DIAGNOSIS — I35.1 NONRHEUMATIC AORTIC (VALVE) INSUFFICIENCY: ICD-10-CM

## 2023-05-22 DIAGNOSIS — I49.1 ATRIAL PREMATURE DEPOLARIZATION: ICD-10-CM

## 2023-05-22 DIAGNOSIS — I45.10 UNSPECIFIED RIGHT BUNDLE-BRANCH BLOCK: ICD-10-CM

## 2023-05-22 DIAGNOSIS — R73.03 PREDIABETES: Primary | ICD-10-CM

## 2023-05-22 DIAGNOSIS — G25.0 ESSENTIAL TREMOR: ICD-10-CM

## 2023-05-22 PROCEDURE — 3017F COLORECTAL CA SCREEN DOC REV: CPT | Performed by: FAMILY MEDICINE

## 2023-05-22 PROCEDURE — G8419 CALC BMI OUT NRM PARAM NOF/U: HCPCS | Performed by: FAMILY MEDICINE

## 2023-05-22 PROCEDURE — 1123F ACP DISCUSS/DSCN MKR DOCD: CPT | Performed by: FAMILY MEDICINE

## 2023-05-22 PROCEDURE — G8427 DOCREV CUR MEDS BY ELIG CLIN: HCPCS | Performed by: FAMILY MEDICINE

## 2023-05-22 PROCEDURE — 99214 OFFICE O/P EST MOD 30 MIN: CPT | Performed by: FAMILY MEDICINE

## 2023-05-22 PROCEDURE — 1036F TOBACCO NON-USER: CPT | Performed by: FAMILY MEDICINE

## 2023-05-22 RX ORDER — ACETAMINOPHEN 160 MG
2000 TABLET,DISINTEGRATING ORAL DAILY
COMMUNITY

## 2023-05-22 RX ORDER — CALCIUM CARBONATE 500(1250)
500 TABLET ORAL DAILY
COMMUNITY

## 2023-05-22 RX ORDER — TOPIRAMATE 50 MG/1
75 TABLET, FILM COATED ORAL 2 TIMES DAILY
COMMUNITY

## 2023-05-22 SDOH — ECONOMIC STABILITY: FOOD INSECURITY: WITHIN THE PAST 12 MONTHS, THE FOOD YOU BOUGHT JUST DIDN'T LAST AND YOU DIDN'T HAVE MONEY TO GET MORE.: NEVER TRUE

## 2023-05-22 SDOH — ECONOMIC STABILITY: INCOME INSECURITY: HOW HARD IS IT FOR YOU TO PAY FOR THE VERY BASICS LIKE FOOD, HOUSING, MEDICAL CARE, AND HEATING?: NOT HARD AT ALL

## 2023-05-22 SDOH — ECONOMIC STABILITY: HOUSING INSECURITY
IN THE LAST 12 MONTHS, WAS THERE A TIME WHEN YOU DID NOT HAVE A STEADY PLACE TO SLEEP OR SLEPT IN A SHELTER (INCLUDING NOW)?: NO

## 2023-05-22 SDOH — ECONOMIC STABILITY: FOOD INSECURITY: WITHIN THE PAST 12 MONTHS, YOU WORRIED THAT YOUR FOOD WOULD RUN OUT BEFORE YOU GOT MONEY TO BUY MORE.: NEVER TRUE

## 2023-05-22 NOTE — PROGRESS NOTES
SUBJECTIVE  Chief Complaint   Patient presents with    Results     Review of results     Cholesterol Problem    Erectile Dysfunction    Other     Hx of IFG, and pulmonary nodules       Having a protracted recovery from his right ankle surgery. Has a bone stimulator. Still with a lot of swelling. Pain under control. He is seeing cardiology for management of cardiac risk factors. He is on a low dose Crestor 5mg daily. ROS:  History obtained from the patient  Respiratory: no cough, shortness of breath, or wheezing  Cardiovascular: no chest pain, palpitations, or dyspnea on exertion  Gastrointestinal: no abdominal pain, N/V, has occasional pain over umbilicus   : ED - labs showed mixed picture with testosterone levels    OBJECTIVE  Blood pressure 110/84, pulse 83, temperature 98.7 °F (37.1 °C), temperature source Temporal, resp. rate 16, height 6' 4\" (1.93 m), weight 223 lb (101.2 kg), SpO2 100 %. General:  alert, cooperative, well appearing, in no apparent distress. CV:  The heart sounds are regular in rate and rhythm. There is a normal S1 and S2. There or no murmurs. Lungs: Inspiratory and expiratory efforts are full and unlabored. Lung sounds are clear and equal to auscultation throughout all lung fields without wheezing, rales, or rhonchi.      Latest Reference Range & Units 05/12/23 00:00   Sodium 134 - 144 mmol/L  134 - 144 mmol/L 141  141   Potassium 3.5 - 5.2 mmol/L  3.5 - 5.2 mmol/L 5.2  5.2   Chloride 96 - 106 mmol/L  96 - 106 mmol/L 106  106   CO2 20 - 29 mmol/L  20 - 29 mmol/L 22  22   BUN,BUNPL 8 - 27 mg/dL  8 - 27 mg/dL 18  18   Creatinine 0.76 - 1.27 mg/dL  0.76 - 1.27 mg/dL 1.03  1.03   BUN/Creatinine Ratio 10 - 24  17   Bun/Cre Ratio 10 - 24 NA 17   Glucose, Random 70 - 99 mg/dL  70 - 99 mg/dL 102 (H)  102 (H)   CALCIUM, SERUM, 245514 8.6 - 10.2 mg/dL  8.6 - 10.2 mg/dL 9.5  9.5   Total Protein 6.0 - 8.5 g/dL  6.0 - 8.5 g/dL 7.0  7.0   Vitamin B-12 232 - 1245 pg/mL  232 - 1245 pg/mL

## 2023-05-22 NOTE — TELEPHONE ENCOUNTER
Abimael Noriega [phone] advising patient that he is due for follow up. Patient asked to contact [facility] to schedule. Enc closed

## 2023-06-22 ENCOUNTER — HOSPITAL ENCOUNTER (OUTPATIENT)
Facility: HOSPITAL | Age: 68
Discharge: HOME | End: 2023-06-22
Payer: MEDICARE

## 2023-06-22 DIAGNOSIS — R91.8 PULMONARY NODULES: ICD-10-CM

## 2023-06-22 PROCEDURE — 71250 CT THORAX DX C-: CPT

## 2023-07-31 RX ORDER — ROSUVASTATIN CALCIUM 5 MG/1
TABLET, COATED ORAL
Qty: 90 TABLET | Refills: 3 | Status: SHIPPED | OUTPATIENT
Start: 2023-07-31

## 2023-10-04 ENCOUNTER — HOSPITAL ENCOUNTER (OUTPATIENT)
Facility: HOSPITAL | Age: 68
Discharge: HOME | End: 2023-10-07
Payer: MEDICARE

## 2023-10-04 LAB
ALBUMIN SERPL-MCNC: 3.7 G/DL (ref 3.4–5)
ALBUMIN/GLOB SERPL: 1.2 (ref 0.8–1.7)
ALP SERPL-CCNC: 65 U/L (ref 45–117)
ALT SERPL-CCNC: 51 U/L (ref 16–61)
ANION GAP SERPL CALC-SCNC: 2 MMOL/L (ref 3–18)
AST SERPL-CCNC: 35 U/L (ref 10–38)
BASOPHILS # BLD: 0 K/UL (ref 0–0.1)
BASOPHILS NFR BLD: 1 % (ref 0–2)
BILIRUB DIRECT SERPL-MCNC: <0.1 MG/DL (ref 0–0.2)
BILIRUB SERPL-MCNC: 0.2 MG/DL (ref 0.2–1)
BUN SERPL-MCNC: 24 MG/DL (ref 7–18)
BUN/CREAT SERPL: 25 (ref 12–20)
CALCIUM SERPL-MCNC: 9.2 MG/DL (ref 8.5–10.1)
CHLORIDE SERPL-SCNC: 110 MMOL/L (ref 100–111)
CO2 SERPL-SCNC: 28 MMOL/L (ref 21–32)
CREAT SERPL-MCNC: 0.96 MG/DL (ref 0.6–1.3)
DIFFERENTIAL METHOD BLD: ABNORMAL
EOSINOPHIL # BLD: 0.5 K/UL (ref 0–0.4)
EOSINOPHIL NFR BLD: 7 % (ref 0–5)
ERYTHROCYTE [DISTWIDTH] IN BLOOD BY AUTOMATED COUNT: 15 % (ref 11.6–14.5)
GLOBULIN SER CALC-MCNC: 3.1 G/DL (ref 2–4)
GLUCOSE SERPL-MCNC: 101 MG/DL (ref 74–99)
HCT VFR BLD AUTO: 42.5 % (ref 36–48)
HGB BLD-MCNC: 13.8 G/DL (ref 13–16)
IMM GRANULOCYTES # BLD AUTO: 0 K/UL (ref 0–0.04)
IMM GRANULOCYTES NFR BLD AUTO: 0 % (ref 0–0.5)
LYMPHOCYTES # BLD: 1.1 K/UL (ref 0.9–3.6)
LYMPHOCYTES NFR BLD: 18 % (ref 21–52)
MCH RBC QN AUTO: 30.3 PG (ref 24–34)
MCHC RBC AUTO-ENTMCNC: 32.5 G/DL (ref 31–37)
MCV RBC AUTO: 93.4 FL (ref 78–100)
MONOCYTES # BLD: 0.6 K/UL (ref 0.05–1.2)
MONOCYTES NFR BLD: 10 % (ref 3–10)
NEUTS SEG # BLD: 3.9 K/UL (ref 1.8–8)
NEUTS SEG NFR BLD: 64 % (ref 40–73)
NRBC # BLD: 0 K/UL (ref 0–0.01)
NRBC BLD-RTO: 0 PER 100 WBC
PLATELET # BLD AUTO: 168 K/UL (ref 135–420)
PMV BLD AUTO: 10 FL (ref 9.2–11.8)
POTASSIUM SERPL-SCNC: 4.2 MMOL/L (ref 3.5–5.5)
PROT SERPL-MCNC: 6.8 G/DL (ref 6.4–8.2)
RBC # BLD AUTO: 4.55 M/UL (ref 4.35–5.65)
SODIUM SERPL-SCNC: 140 MMOL/L (ref 136–145)
WBC # BLD AUTO: 6.2 K/UL (ref 4.6–13.2)

## 2023-10-04 PROCEDURE — 85025 COMPLETE CBC W/AUTO DIFF WBC: CPT

## 2023-10-04 PROCEDURE — 80048 BASIC METABOLIC PNL TOTAL CA: CPT

## 2023-10-04 PROCEDURE — 80076 HEPATIC FUNCTION PANEL: CPT

## 2023-10-04 PROCEDURE — 36415 COLL VENOUS BLD VENIPUNCTURE: CPT

## 2023-11-08 SDOH — HEALTH STABILITY: PHYSICAL HEALTH: ON AVERAGE, HOW MANY DAYS PER WEEK DO YOU ENGAGE IN MODERATE TO STRENUOUS EXERCISE (LIKE A BRISK WALK)?: 0 DAYS

## 2023-11-08 SDOH — HEALTH STABILITY: PHYSICAL HEALTH: ON AVERAGE, HOW MANY MINUTES DO YOU ENGAGE IN EXERCISE AT THIS LEVEL?: 0 MIN

## 2023-11-08 ASSESSMENT — PATIENT HEALTH QUESTIONNAIRE - PHQ9
SUM OF ALL RESPONSES TO PHQ QUESTIONS 1-9: 0
SUM OF ALL RESPONSES TO PHQ QUESTIONS 1-9: 0
SUM OF ALL RESPONSES TO PHQ9 QUESTIONS 1 & 2: 0
2. FEELING DOWN, DEPRESSED OR HOPELESS: 0
1. LITTLE INTEREST OR PLEASURE IN DOING THINGS: 0
SUM OF ALL RESPONSES TO PHQ QUESTIONS 1-9: 0
SUM OF ALL RESPONSES TO PHQ QUESTIONS 1-9: 0

## 2023-11-08 ASSESSMENT — LIFESTYLE VARIABLES
HOW OFTEN DO YOU HAVE SIX OR MORE DRINKS ON ONE OCCASION: 1
HOW OFTEN DO YOU HAVE A DRINK CONTAINING ALCOHOL: 2-4 TIMES A MONTH
HOW MANY STANDARD DRINKS CONTAINING ALCOHOL DO YOU HAVE ON A TYPICAL DAY: 1
HOW MANY STANDARD DRINKS CONTAINING ALCOHOL DO YOU HAVE ON A TYPICAL DAY: 1 OR 2
HOW OFTEN DO YOU HAVE A DRINK CONTAINING ALCOHOL: 3

## 2023-11-20 NOTE — PATIENT INSTRUCTIONS
Patient Education        Well Visit, Over 72: Care Instructions  Well visits can help you stay healthy. Your doctor has checked your overall health and may have suggested ways to take good care of yourself. Your doctor also may have recommended tests. You can help prevent illness with healthy eating, good sleep, vaccinations, regular exercise, and other steps. Get the tests that you and your doctor decide on. Depending on your age and risks, examples might include hearing tests as well as screening for colon, breast, and lung cancer. Screening helps find diseases before any symptoms appear. Eat healthy foods. Choose fruits, vegetables, whole grains, lean protein, and low-fat dairy foods. Limit saturated fat, and reduce salt. Limit alcohol. Men should have no more than 2 drinks a day. Women should have no more than 1. For some people, no alcohol is the best choice. Exercise. It can help prevent falls. Get at least 30 minutes of exercise on most days of the week. Walking, yoga, and jose eduardo chi can be good choices. Reach and stay at your healthy weight. This will lower your risk for many health problems. Take care of your mental health. Try to stay connected with friends, family, and community, and find ways to manage stress. If you're feeling depressed or hopeless, talk to someone. A counselor can help. If you don't have a counselor, talk to your doctor. Talk to your doctor if you think you may have a problem with alcohol or drug use. This includes prescription medicines and illegal drugs. Avoid tobacco and nicotine: Don't smoke, vape, or chew. If you need help quitting, talk to your doctor. Practice safer sex. Getting tested, using condoms or dental dams, and limiting sex partners can help prevent STIs. Make an advance directive. This is a legal way to tell your family and doctor what you want to happen at the end of your life or when you can't speak for yourself.    Prevent problems where

## 2023-11-21 ENCOUNTER — OFFICE VISIT (OUTPATIENT)
Age: 68
End: 2023-11-21
Payer: MEDICARE

## 2023-11-21 VITALS
OXYGEN SATURATION: 98 % | WEIGHT: 226.13 LBS | RESPIRATION RATE: 16 BRPM | HEIGHT: 76 IN | BODY MASS INDEX: 27.54 KG/M2 | SYSTOLIC BLOOD PRESSURE: 132 MMHG | TEMPERATURE: 98.2 F | HEART RATE: 77 BPM | DIASTOLIC BLOOD PRESSURE: 76 MMHG

## 2023-11-21 VITALS
OXYGEN SATURATION: 98 % | SYSTOLIC BLOOD PRESSURE: 132 MMHG | RESPIRATION RATE: 16 BRPM | HEART RATE: 77 BPM | HEIGHT: 76 IN | BODY MASS INDEX: 27.54 KG/M2 | TEMPERATURE: 98.2 F | WEIGHT: 226.13 LBS | DIASTOLIC BLOOD PRESSURE: 76 MMHG

## 2023-11-21 DIAGNOSIS — I35.1 NONRHEUMATIC AORTIC (VALVE) INSUFFICIENCY: ICD-10-CM

## 2023-11-21 DIAGNOSIS — E53.8 DEFICIENCY OF OTHER SPECIFIED B GROUP VITAMINS: ICD-10-CM

## 2023-11-21 DIAGNOSIS — I49.1 ATRIAL PREMATURE DEPOLARIZATION: ICD-10-CM

## 2023-11-21 DIAGNOSIS — Z00.00 MEDICARE ANNUAL WELLNESS VISIT, SUBSEQUENT: Primary | ICD-10-CM

## 2023-11-21 DIAGNOSIS — I45.10 UNSPECIFIED RIGHT BUNDLE-BRANCH BLOCK: ICD-10-CM

## 2023-11-21 DIAGNOSIS — E78.00 PURE HYPERCHOLESTEROLEMIA, UNSPECIFIED: ICD-10-CM

## 2023-11-21 DIAGNOSIS — R73.03 PREDIABETES: Primary | ICD-10-CM

## 2023-11-21 DIAGNOSIS — Z71.89 ACP (ADVANCE CARE PLANNING): ICD-10-CM

## 2023-11-21 DIAGNOSIS — Z12.5 ENCOUNTER FOR SCREENING FOR MALIGNANT NEOPLASM OF PROSTATE: ICD-10-CM

## 2023-11-21 DIAGNOSIS — G25.0 ESSENTIAL TREMOR: ICD-10-CM

## 2023-11-21 DIAGNOSIS — N52.9 ERECTILE DYSFUNCTION, UNSPECIFIED ERECTILE DYSFUNCTION TYPE: ICD-10-CM

## 2023-11-21 DIAGNOSIS — R73.01 IMPAIRED FASTING GLUCOSE: ICD-10-CM

## 2023-11-21 LAB — HBA1C MFR BLD: 5.6 %

## 2023-11-21 PROCEDURE — 99497 ADVNCD CARE PLAN 30 MIN: CPT | Performed by: FAMILY MEDICINE

## 2023-11-21 PROCEDURE — G8484 FLU IMMUNIZE NO ADMIN: HCPCS | Performed by: FAMILY MEDICINE

## 2023-11-21 PROCEDURE — G0439 PPPS, SUBSEQ VISIT: HCPCS | Performed by: FAMILY MEDICINE

## 2023-11-21 PROCEDURE — G8419 CALC BMI OUT NRM PARAM NOF/U: HCPCS | Performed by: FAMILY MEDICINE

## 2023-11-21 PROCEDURE — 1123F ACP DISCUSS/DSCN MKR DOCD: CPT | Performed by: FAMILY MEDICINE

## 2023-11-21 PROCEDURE — 1036F TOBACCO NON-USER: CPT | Performed by: FAMILY MEDICINE

## 2023-11-21 PROCEDURE — 99214 OFFICE O/P EST MOD 30 MIN: CPT | Performed by: FAMILY MEDICINE

## 2023-11-21 PROCEDURE — PBSHW AMB POC HEMOGLOBIN A1C: Performed by: FAMILY MEDICINE

## 2023-11-21 PROCEDURE — 83036 HEMOGLOBIN GLYCOSYLATED A1C: CPT | Performed by: FAMILY MEDICINE

## 2023-11-21 PROCEDURE — 3017F COLORECTAL CA SCREEN DOC REV: CPT | Performed by: FAMILY MEDICINE

## 2023-11-21 PROCEDURE — G8427 DOCREV CUR MEDS BY ELIG CLIN: HCPCS | Performed by: FAMILY MEDICINE

## 2023-11-21 RX ORDER — TOPIRAMATE 100 MG/1
100 TABLET, FILM COATED ORAL DAILY
COMMUNITY
Start: 2023-09-18 | End: 2023-11-21

## 2023-11-21 RX ORDER — PREGABALIN 75 MG/1
75 CAPSULE ORAL 2 TIMES DAILY
COMMUNITY
Start: 2023-10-30

## 2023-11-21 NOTE — ACP (ADVANCE CARE PLANNING)
Advance Care Planning    General Advance Care Planning (ACP) Conversation      Date of Conversation: 2023  Conducted with: Patient with Decision Making Capacity    Healthcare Decision Maker:       Primary Decision Maker: Blanca Ding - [phone]    Content/Action Overview:   Has NO ACP documents/care preferences - information provided, considering goals and options  Reviewed DNR/DNI and patient elects Full Code (Attempt Resuscitation)    Advised to bring ACP to office for scanning. He says he had a pre-paid  package and had a living will drafted.      Length of Voluntary ACP Conversation in minutes:  16 minutes     Monie Burgos MD
right total knee replacement/done

## 2023-11-21 NOTE — PROGRESS NOTES
Chief Complaint   Patient presents with    Cholesterol Problem    Erectile Dysfunction    IFG / PRE-DM    Pulmonary Nodules    Tremors     Hx of essential tremors    Vitamin B-12 Def      1. \"Have you been to the ER, urgent care clinic since your last visit? Hospitalized since your last visit? \" No    2. \"Have you seen or consulted any other health care providers outside of the [address] [place] [address] since your last visit? \" No     3. For patients aged 43-73: Has the patient had a colonoscopy / FIT/ Cologuard?  Yes - no Care Gap present due 04/05/2027 VIEW ALL

## 2023-11-21 NOTE — PATIENT INSTRUCTIONS
healthcare professional. [address] any warranty or liability for your use of this information. Patient Education        Counting Carbohydrates for Diabetes: Care Instructions  Overview     Managing the amount of carbohydrate (carbs) you eat is an important part of planning healthy meals when you have diabetes. Carbs raise blood sugar more than any other nutrient. Carbs are found in grains, starchy vegetables, fruits, and milk and yogurt. Carbs are also found in sugar-sweetened foods and drinks. The more carbs you eat at one time, the higher your blood sugar will rise. Counting carbs can help you keep your blood sugar within your target range. If you use insulin, counting carbs helps you match the right amount of insulin to the number of grams of carbs in a meal.  Follow-up care is a key part of your treatment and safety. Be sure to make and go to all appointments, and call your doctor if you are having problems. It's also a good idea to know your test results and keep a list of the medicines you take. How can you care for yourself at home? Know your daily amount of carbohydrates  Your daily amount depends on several things, such as your weight, how active you are, which diabetes medicines you take, and what your goals are for your blood sugar levels. A registered dietitian or diabetes educator can help you plan how many carbs to include in each meal and snack. For most adults, a guideline for the daily amount of carbs is:  45 to 60 grams at each meal. That's about the same as 3 to 4 carbohydrate servings. 15 to 20 grams at each snack. That's about the same as 1 carbohydrate serving. Count carbs  Counting carbs lets you know how much rapid-acting insulin to take before you eat. If you use an insulin pump, you get a constant rate of insulin during the day. So the pump must be programmed at meals. This gives you extra insulin to cover the rise in blood sugar after meals.   If you

## 2023-11-22 ENCOUNTER — HOSPITAL ENCOUNTER (OUTPATIENT)
Facility: HOSPITAL | Age: 68
Discharge: HOME | End: 2023-11-25
Payer: MEDICARE

## 2023-11-22 LAB
CHOLEST SERPL-MCNC: 245 MG/DL
HDLC SERPL-MCNC: 78 MG/DL (ref 40–60)
HDLC SERPL: 3.1 (ref 0–5)
LDLC SERPL CALC-MCNC: 144.2 MG/DL (ref 0–100)
LIPID PANEL: ABNORMAL
PSA SERPL-MCNC: 1.3 NG/ML (ref 0–4)
TRIGL SERPL-MCNC: 114 MG/DL
VIT B12 SERPL-MCNC: >2000 PG/ML (ref 211–911)
VLDLC SERPL CALC-MCNC: 22.8 MG/DL

## 2023-11-22 PROCEDURE — 36415 COLL VENOUS BLD VENIPUNCTURE: CPT

## 2023-11-22 PROCEDURE — G0103 PSA SCREENING: HCPCS

## 2023-11-22 PROCEDURE — 82607 VITAMIN B-12: CPT

## 2023-11-22 PROCEDURE — 80061 LIPID PANEL: CPT

## 2023-12-07 ENCOUNTER — OFFICE VISIT (OUTPATIENT)
Age: 68
End: 2023-12-07
Payer: MEDICARE

## 2023-12-07 VITALS
SYSTOLIC BLOOD PRESSURE: 122 MMHG | HEART RATE: 65 BPM | HEIGHT: 76 IN | BODY MASS INDEX: 27.64 KG/M2 | WEIGHT: 227 LBS | OXYGEN SATURATION: 99 % | DIASTOLIC BLOOD PRESSURE: 70 MMHG

## 2023-12-07 DIAGNOSIS — I45.10 RBBB: Primary | ICD-10-CM

## 2023-12-07 DIAGNOSIS — E78.00 HYPERCHOLESTEROLEMIA: ICD-10-CM

## 2023-12-07 PROCEDURE — G8419 CALC BMI OUT NRM PARAM NOF/U: HCPCS | Performed by: INTERNAL MEDICINE

## 2023-12-07 PROCEDURE — 93000 ELECTROCARDIOGRAM COMPLETE: CPT | Performed by: INTERNAL MEDICINE

## 2023-12-07 PROCEDURE — 3017F COLORECTAL CA SCREEN DOC REV: CPT | Performed by: INTERNAL MEDICINE

## 2023-12-07 PROCEDURE — 99213 OFFICE O/P EST LOW 20 MIN: CPT | Performed by: INTERNAL MEDICINE

## 2023-12-07 PROCEDURE — G8484 FLU IMMUNIZE NO ADMIN: HCPCS | Performed by: INTERNAL MEDICINE

## 2023-12-07 PROCEDURE — 1036F TOBACCO NON-USER: CPT | Performed by: INTERNAL MEDICINE

## 2023-12-07 PROCEDURE — G8427 DOCREV CUR MEDS BY ELIG CLIN: HCPCS | Performed by: INTERNAL MEDICINE

## 2023-12-07 PROCEDURE — 1123F ACP DISCUSS/DSCN MKR DOCD: CPT | Performed by: INTERNAL MEDICINE

## 2023-12-07 ASSESSMENT — ANXIETY QUESTIONNAIRES
2. NOT BEING ABLE TO STOP OR CONTROL WORRYING: 0
4. TROUBLE RELAXING: 0
GAD7 TOTAL SCORE: 0
6. BECOMING EASILY ANNOYED OR IRRITABLE: 0
7. FEELING AFRAID AS IF SOMETHING AWFUL MIGHT HAPPEN: 0
1. FEELING NERVOUS, ANXIOUS, OR ON EDGE: 0
5. BEING SO RESTLESS THAT IT IS HARD TO SIT STILL: 0
3. WORRYING TOO MUCH ABOUT DIFFERENT THINGS: 0

## 2023-12-07 ASSESSMENT — ENCOUNTER SYMPTOMS
SORE THROAT: 0
ABDOMINAL PAIN: 0
NAUSEA: 0
VOMITING: 0
SHORTNESS OF BREATH: 0
ABDOMINAL DISTENTION: 0
COUGH: 0

## 2023-12-07 ASSESSMENT — PATIENT HEALTH QUESTIONNAIRE - PHQ9
1. LITTLE INTEREST OR PLEASURE IN DOING THINGS: 0
SUM OF ALL RESPONSES TO PHQ QUESTIONS 1-9: 0
SUM OF ALL RESPONSES TO PHQ9 QUESTIONS 1 & 2: 0
2. FEELING DOWN, DEPRESSED OR HOPELESS: 0
SUM OF ALL RESPONSES TO PHQ QUESTIONS 1-9: 0

## 2023-12-07 NOTE — PROGRESS NOTES
12/07/23     Margot Tam  is a 76 y.o. male     Chief Complaint   Patient presents with    Follow-up     1 year       HPI  Patient presents for a office visit. He has a past medical history significant for dyslipidemia and atrial premature contractions. He was discovered to have a small abdominal aortic aneurysm measuring 2.5 cm in diameter. He also discovered that he has a family history for premature coronary disease. He underwent a regular exercise treadmill stress test in August 2014 which was negative at 6 minutes total exercise time. He also underwent an echocardiogram in March 2016 which showed preserved LV systolic function, EF 01-75% with grade 1 diastolic dysfunction and mild aortic valve insufficiency. The patient was last seen in the office 12 months ago. Since last visit, he has been feeling well. He denies any chest pain, new shortness of breath, heart palpitations, dizzy spells or syncope. He had surgery on his right ankle and has some limited mobility in that foot but is currently participating in physical therapy. Past Medical History:   Diagnosis Date    AAA (abdominal aortic aneurysm) ([address]) 03/12/2015    No AAA. Patent bilateral CIAs. Patent renal arteries. AAA (abdominal aortic aneurysm) ([address])     saw vascular and notes indicate they did not beleive he had one and the reading was poor. was told no follow-up needed    Anxiety     Benign essential tremor     Hearing loss     left ear    Hemorrhoids     History of echocardiogram 03/04/2016    EF 55-60%. No RWMA. Gr 1 DDfx. Mild AI. No significant valvular heart disease. History of exercise stress test 08/01/2014    Neg EKG on maximal treadmill stress test.  One 6-beat episode of NSVT. Ex time 6 min.     Hypercholesterolemia     has not tolerated some statins in the past    Inguinal hernia, right     Lumbar herniated disc     L4-5, Dr. Shivani Beard    Lung nodule 06/2022    PAC (premature atrial contraction)

## 2023-12-07 NOTE — PROGRESS NOTES
David Riggins presents today for   Chief Complaint   Patient presents with    Follow-up     1 year       David Riggins preferred language for health care discussion is english/other. Is someone accompanying this pt? no    Is the patient using any DME equipment during OV? no    Depression Screening:  Depression: Not at risk (12/7/2023)    PHQ-2     PHQ-2 Score: 0        Learning Assessment:  Who is the primary learner? Patient    What is the preferred language for health care of the primary learner? ENGLISH    How does the primary learner prefer to learn new concepts? DEMONSTRATION    Answered By patient    Relationship to Learner SELF           Pt currently taking Anticoagulant therapy? no    Pt currently taking Antiplatelet therapy ? no      Coordination of Care:  1. Have you been to the ER, urgent care clinic since your last visit? Hospitalized since your last visit? no    2. Have you seen or consulted any other health care providers outside of the [address] [place] [address] since your last visit? Include any pap smears or colon screening.  no

## 2024-05-21 ENCOUNTER — OFFICE VISIT (OUTPATIENT)
Age: 69
End: 2024-05-21
Payer: MEDICARE

## 2024-05-21 VITALS
BODY MASS INDEX: 27.28 KG/M2 | SYSTOLIC BLOOD PRESSURE: 120 MMHG | RESPIRATION RATE: 16 BRPM | HEIGHT: 76 IN | TEMPERATURE: 98.3 F | WEIGHT: 224 LBS | HEART RATE: 63 BPM | DIASTOLIC BLOOD PRESSURE: 82 MMHG | OXYGEN SATURATION: 97 %

## 2024-05-21 DIAGNOSIS — R73.03 PREDIABETES: Primary | ICD-10-CM

## 2024-05-21 DIAGNOSIS — N52.9 ERECTILE DYSFUNCTION, UNSPECIFIED ERECTILE DYSFUNCTION TYPE: ICD-10-CM

## 2024-05-21 DIAGNOSIS — Z12.5 ENCOUNTER FOR SCREENING FOR MALIGNANT NEOPLASM OF PROSTATE: ICD-10-CM

## 2024-05-21 DIAGNOSIS — G25.0 ESSENTIAL TREMOR: ICD-10-CM

## 2024-05-21 DIAGNOSIS — E78.00 PURE HYPERCHOLESTEROLEMIA, UNSPECIFIED: ICD-10-CM

## 2024-05-21 DIAGNOSIS — I45.10 UNSPECIFIED RIGHT BUNDLE-BRANCH BLOCK: ICD-10-CM

## 2024-05-21 DIAGNOSIS — E53.8 DEFICIENCY OF OTHER SPECIFIED B GROUP VITAMINS: ICD-10-CM

## 2024-05-21 DIAGNOSIS — C44.90 SKIN CANCER: ICD-10-CM

## 2024-05-21 DIAGNOSIS — I49.1 ATRIAL PREMATURE DEPOLARIZATION: ICD-10-CM

## 2024-05-21 DIAGNOSIS — R73.01 IMPAIRED FASTING GLUCOSE: ICD-10-CM

## 2024-05-21 DIAGNOSIS — I35.1 NONRHEUMATIC AORTIC (VALVE) INSUFFICIENCY: ICD-10-CM

## 2024-05-21 LAB — HBA1C MFR BLD: 5.6 %

## 2024-05-21 PROCEDURE — G8427 DOCREV CUR MEDS BY ELIG CLIN: HCPCS | Performed by: FAMILY MEDICINE

## 2024-05-21 PROCEDURE — G8419 CALC BMI OUT NRM PARAM NOF/U: HCPCS | Performed by: FAMILY MEDICINE

## 2024-05-21 PROCEDURE — 99214 OFFICE O/P EST MOD 30 MIN: CPT | Performed by: FAMILY MEDICINE

## 2024-05-21 PROCEDURE — 3017F COLORECTAL CA SCREEN DOC REV: CPT | Performed by: FAMILY MEDICINE

## 2024-05-21 PROCEDURE — 83036 HEMOGLOBIN GLYCOSYLATED A1C: CPT | Performed by: FAMILY MEDICINE

## 2024-05-21 PROCEDURE — 1036F TOBACCO NON-USER: CPT | Performed by: FAMILY MEDICINE

## 2024-05-21 PROCEDURE — 1123F ACP DISCUSS/DSCN MKR DOCD: CPT | Performed by: FAMILY MEDICINE

## 2024-05-21 RX ORDER — FLUOROURACIL 50 MG/G
CREAM TOPICAL DAILY
COMMUNITY
Start: 2024-03-28

## 2024-05-21 RX ORDER — TOPIRAMATE 100 MG
100 TABLET ORAL 2 TIMES DAILY
COMMUNITY
Start: 2024-05-13

## 2024-05-21 ASSESSMENT — PATIENT HEALTH QUESTIONNAIRE - PHQ9
1. LITTLE INTEREST OR PLEASURE IN DOING THINGS: NOT AT ALL
SUM OF ALL RESPONSES TO PHQ QUESTIONS 1-9: 0
SUM OF ALL RESPONSES TO PHQ9 QUESTIONS 1 & 2: 0
SUM OF ALL RESPONSES TO PHQ QUESTIONS 1-9: 0
2. FEELING DOWN, DEPRESSED OR HOPELESS: NOT AT ALL

## 2024-05-21 NOTE — PATIENT INSTRUCTIONS

## 2024-05-21 NOTE — PROGRESS NOTES
SUBJECTIVE  Chief Complaint   Patient presents with    Vitamin B-12 Def    PRE-DM -IFG    Tremors    Erectile Dysfunction    Cholesterol Problem      He is taking vit B12 for his history of deficiency.     Saw derm and had 3 biopsies, one of which is cancerous on his chest.  He is soon to have a large area removal.     He is seeing cardiology for management of cardiac risk factors.  He is on a low dose Crestor 5mg daily.     Taking topamax for tremors at a higher dose now.      ROS:  History obtained from the patient  Respiratory: no cough, shortness of breath, or wheezing  Cardiovascular: no chest pain, palpitations, or dyspnea on exertion  Gastrointestinal: no abdominal pain, N/V, has occasional pain over umbilicus   : ED - labs showed mixed picture with testosterone levels    OBJECTIVE  Blood pressure 120/82, pulse 63, temperature 98.3 °F (36.8 °C), temperature source Temporal, resp. rate 16, height 1.93 m (6' 4\"), weight 101.6 kg (224 lb), SpO2 97 %.   General:  alert, cooperative, well appearing, in no apparent distress.  CV:  The heart sounds are regular in rate and rhythm.  There is a normal S1 and S2.  There or no murmurs.    Lungs:  Inspiratory and expiratory efforts are full and unlabored.  Lung sounds are clear and equal to auscultation throughout all lung fields without wheezing, rales, or rhonchi.  Ext: no swelling of shins.    Results for orders placed or performed in visit on 05/21/24   AMB POC HEMOGLOBIN A1C   Result Value Ref Range    Hemoglobin A1C, POC 5.6 %         ASSESSMENT / PLAN   Diagnosis Orders   1. Prediabetes        2. Impaired fasting glucose  AMB POC HEMOGLOBIN A1C    Hemoglobin A1C      3. Pure hypercholesterolemia, unspecified  Comprehensive Metabolic Panel    Lipid Panel      4. Atrial premature depolarization  CBC with Auto Differential      5. Unspecified right bundle-branch block  CBC with Auto Differential      6. Nonrheumatic aortic (valve) insufficiency  CBC with Auto

## 2024-05-21 NOTE — PROGRESS NOTES
\"Have you been to the ER, urgent care clinic since your last visit?  Hospitalized since your last visit?\"    NO    “Have you seen or consulted any other health care providers outside of [facility] since your last visit?”    YES - When: approximately 3 weeks  ago.  Where and Why: [facility] on 05/13/2024 for tremors.          Click Here for Release of Records Request

## 2024-06-28 ENCOUNTER — HOSPITAL ENCOUNTER (OUTPATIENT)
Facility: HOSPITAL | Age: 69
End: 2024-06-28
Payer: MEDICARE

## 2024-06-28 LAB
ALBUMIN SERPL-MCNC: 3.9 G/DL (ref 3.4–5)
ALBUMIN/GLOB SERPL: 1.2 (ref 0.8–1.7)
ALP SERPL-CCNC: 67 U/L (ref 45–117)
ALT SERPL-CCNC: 51 U/L (ref 16–61)
ANION GAP SERPL CALC-SCNC: 3 MMOL/L (ref 3–18)
AST SERPL-CCNC: 36 U/L (ref 10–38)
BASOPHILS # BLD: 0 K/UL (ref 0–0.1)
BASOPHILS NFR BLD: 1 % (ref 0–2)
BILIRUB DIRECT SERPL-MCNC: <0.1 MG/DL (ref 0–0.2)
BILIRUB SERPL-MCNC: 0.4 MG/DL (ref 0.2–1)
BUN SERPL-MCNC: 17 MG/DL (ref 7–18)
BUN/CREAT SERPL: 16 (ref 12–20)
CALCIUM SERPL-MCNC: 9.5 MG/DL (ref 8.5–10.1)
CHLORIDE SERPL-SCNC: 111 MMOL/L (ref 100–111)
CO2 SERPL-SCNC: 27 MMOL/L (ref 21–32)
CREAT SERPL-MCNC: 1.04 MG/DL (ref 0.6–1.3)
DIFFERENTIAL METHOD BLD: ABNORMAL
EOSINOPHIL # BLD: 0.6 K/UL (ref 0–0.4)
EOSINOPHIL NFR BLD: 10 % (ref 0–5)
ERYTHROCYTE [DISTWIDTH] IN BLOOD BY AUTOMATED COUNT: 14 % (ref 11.6–14.5)
GLOBULIN SER CALC-MCNC: 3.2 G/DL (ref 2–4)
GLUCOSE SERPL-MCNC: 107 MG/DL (ref 74–99)
HCT VFR BLD AUTO: 44.8 % (ref 36–48)
HGB BLD-MCNC: 14.8 G/DL (ref 13–16)
IMM GRANULOCYTES # BLD AUTO: 0 K/UL (ref 0–0.04)
IMM GRANULOCYTES NFR BLD AUTO: 0 % (ref 0–0.5)
LYMPHOCYTES # BLD: 1.2 K/UL (ref 0.9–3.6)
LYMPHOCYTES NFR BLD: 20 % (ref 21–52)
MCH RBC QN AUTO: 31 PG (ref 24–34)
MCHC RBC AUTO-ENTMCNC: 33 G/DL (ref 31–37)
MCV RBC AUTO: 93.9 FL (ref 78–100)
MONOCYTES # BLD: 0.5 K/UL (ref 0.05–1.2)
MONOCYTES NFR BLD: 8 % (ref 3–10)
NEUTS SEG # BLD: 3.4 K/UL (ref 1.8–8)
NEUTS SEG NFR BLD: 60 % (ref 40–73)
NRBC # BLD: 0 K/UL (ref 0–0.01)
NRBC BLD-RTO: 0 PER 100 WBC
PLATELET # BLD AUTO: 150 K/UL (ref 135–420)
PMV BLD AUTO: 10.3 FL (ref 9.2–11.8)
POTASSIUM SERPL-SCNC: 4.1 MMOL/L (ref 3.5–5.5)
PROT SERPL-MCNC: 7.1 G/DL (ref 6.4–8.2)
RBC # BLD AUTO: 4.77 M/UL (ref 4.35–5.65)
SODIUM SERPL-SCNC: 141 MMOL/L (ref 136–145)
WBC # BLD AUTO: 5.7 K/UL (ref 4.6–13.2)

## 2024-06-28 PROCEDURE — 80048 BASIC METABOLIC PNL TOTAL CA: CPT

## 2024-06-28 PROCEDURE — 85025 COMPLETE CBC W/AUTO DIFF WBC: CPT

## 2024-06-28 PROCEDURE — 80076 HEPATIC FUNCTION PANEL: CPT

## 2024-06-28 PROCEDURE — 36415 COLL VENOUS BLD VENIPUNCTURE: CPT

## 2024-10-07 RX ORDER — ROSUVASTATIN CALCIUM 5 MG/1
TABLET, COATED ORAL
Qty: 90 TABLET | Refills: 3 | Status: SHIPPED | OUTPATIENT
Start: 2024-10-07

## 2024-10-17 ENCOUNTER — PATIENT MESSAGE (OUTPATIENT)
Facility: CLINIC | Age: 69
End: 2024-10-17

## 2024-11-15 ENCOUNTER — HOSPITAL ENCOUNTER (OUTPATIENT)
Facility: HOSPITAL | Age: 69
Discharge: HOME | End: 2024-11-18
Payer: MEDICARE

## 2024-11-15 DIAGNOSIS — Z12.5 ENCOUNTER FOR SCREENING FOR MALIGNANT NEOPLASM OF PROSTATE: ICD-10-CM

## 2024-11-15 DIAGNOSIS — I45.10 UNSPECIFIED RIGHT BUNDLE-BRANCH BLOCK: ICD-10-CM

## 2024-11-15 DIAGNOSIS — G25.0 ESSENTIAL TREMOR: ICD-10-CM

## 2024-11-15 DIAGNOSIS — E53.8 DEFICIENCY OF OTHER SPECIFIED B GROUP VITAMINS: ICD-10-CM

## 2024-11-15 DIAGNOSIS — I49.1 ATRIAL PREMATURE DEPOLARIZATION: ICD-10-CM

## 2024-11-15 DIAGNOSIS — R73.01 IMPAIRED FASTING GLUCOSE: ICD-10-CM

## 2024-11-15 DIAGNOSIS — E78.00 PURE HYPERCHOLESTEROLEMIA, UNSPECIFIED: ICD-10-CM

## 2024-11-15 DIAGNOSIS — I35.1 NONRHEUMATIC AORTIC (VALVE) INSUFFICIENCY: ICD-10-CM

## 2024-11-15 LAB
ALBUMIN SERPL-MCNC: 4.3 G/DL (ref 3.4–5)
ALBUMIN/GLOB SERPL: 1.4 (ref 0.8–1.7)
ALP SERPL-CCNC: 58 U/L (ref 45–117)
ALT SERPL-CCNC: 53 U/L (ref 16–61)
ANION GAP SERPL CALC-SCNC: 6 MMOL/L (ref 3–18)
AST SERPL-CCNC: 36 U/L (ref 10–38)
BASOPHILS # BLD: 0 K/UL (ref 0–0.1)
BASOPHILS NFR BLD: 1 % (ref 0–2)
BILIRUB SERPL-MCNC: 0.5 MG/DL (ref 0.2–1)
BUN SERPL-MCNC: 17 MG/DL (ref 7–18)
BUN/CREAT SERPL: 16 (ref 12–20)
CALCIUM SERPL-MCNC: 9.2 MG/DL (ref 8.5–10.1)
CHLORIDE SERPL-SCNC: 110 MMOL/L (ref 100–111)
CHOLEST SERPL-MCNC: 230 MG/DL
CO2 SERPL-SCNC: 25 MMOL/L (ref 21–32)
CREAT SERPL-MCNC: 1.06 MG/DL (ref 0.6–1.3)
DIFFERENTIAL METHOD BLD: ABNORMAL
EOSINOPHIL # BLD: 0.4 K/UL (ref 0–0.4)
EOSINOPHIL NFR BLD: 7 % (ref 0–5)
ERYTHROCYTE [DISTWIDTH] IN BLOOD BY AUTOMATED COUNT: 14.7 % (ref 11.6–14.5)
EST. AVERAGE GLUCOSE BLD GHB EST-MCNC: 111 MG/DL
GLOBULIN SER CALC-MCNC: 3.1 G/DL (ref 2–4)
GLUCOSE SERPL-MCNC: 88 MG/DL (ref 74–99)
HBA1C MFR BLD: 5.5 % (ref 4.2–5.6)
HCT VFR BLD AUTO: 46.7 % (ref 36–48)
HDLC SERPL-MCNC: 98 MG/DL (ref 40–60)
HDLC SERPL: 2.3 (ref 0–5)
HGB BLD-MCNC: 15.2 G/DL (ref 13–16)
IMM GRANULOCYTES # BLD AUTO: 0 K/UL (ref 0–0.04)
IMM GRANULOCYTES NFR BLD AUTO: 0 % (ref 0–0.5)
LDLC SERPL CALC-MCNC: 115.2 MG/DL (ref 0–100)
LIPID PANEL: ABNORMAL
LYMPHOCYTES # BLD: 1.2 K/UL (ref 0.9–3.6)
LYMPHOCYTES NFR BLD: 23 % (ref 21–52)
MCH RBC QN AUTO: 30.8 PG (ref 24–34)
MCHC RBC AUTO-ENTMCNC: 32.5 G/DL (ref 31–37)
MCV RBC AUTO: 94.5 FL (ref 78–100)
MONOCYTES # BLD: 0.5 K/UL (ref 0.05–1.2)
MONOCYTES NFR BLD: 9 % (ref 3–10)
NEUTS SEG # BLD: 3.2 K/UL (ref 1.8–8)
NEUTS SEG NFR BLD: 60 % (ref 40–73)
NRBC # BLD: 0 K/UL (ref 0–0.01)
NRBC BLD-RTO: 0 PER 100 WBC
PLATELET # BLD AUTO: 164 K/UL (ref 135–420)
PMV BLD AUTO: 10.5 FL (ref 9.2–11.8)
POTASSIUM SERPL-SCNC: 4.1 MMOL/L (ref 3.5–5.5)
PROT SERPL-MCNC: 7.4 G/DL (ref 6.4–8.2)
PSA SERPL-MCNC: 1.5 NG/ML (ref 0–4)
RBC # BLD AUTO: 4.94 M/UL (ref 4.35–5.65)
SODIUM SERPL-SCNC: 141 MMOL/L (ref 136–145)
TRIGL SERPL-MCNC: 84 MG/DL
VIT B12 SERPL-MCNC: 655 PG/ML (ref 211–911)
VLDLC SERPL CALC-MCNC: 16.8 MG/DL
WBC # BLD AUTO: 5.3 K/UL (ref 4.6–13.2)

## 2024-11-15 PROCEDURE — 83036 HEMOGLOBIN GLYCOSYLATED A1C: CPT

## 2024-11-15 PROCEDURE — 82607 VITAMIN B-12: CPT

## 2024-11-15 PROCEDURE — 80053 COMPREHEN METABOLIC PANEL: CPT

## 2024-11-15 PROCEDURE — 36415 COLL VENOUS BLD VENIPUNCTURE: CPT

## 2024-11-15 PROCEDURE — 80061 LIPID PANEL: CPT

## 2024-11-15 PROCEDURE — G0103 PSA SCREENING: HCPCS

## 2024-11-15 PROCEDURE — 85025 COMPLETE CBC W/AUTO DIFF WBC: CPT

## 2024-11-25 SDOH — ECONOMIC STABILITY: TRANSPORTATION INSECURITY
IN THE PAST 12 MONTHS, HAS LACK OF TRANSPORTATION KEPT YOU FROM MEETINGS, WORK, OR FROM GETTING THINGS NEEDED FOR DAILY LIVING?: NO

## 2024-11-25 SDOH — ECONOMIC STABILITY: INCOME INSECURITY: HOW HARD IS IT FOR YOU TO PAY FOR THE VERY BASICS LIKE FOOD, HOUSING, MEDICAL CARE, AND HEATING?: NOT HARD AT ALL

## 2024-11-25 SDOH — ECONOMIC STABILITY: FOOD INSECURITY: WITHIN THE PAST 12 MONTHS, THE FOOD YOU BOUGHT JUST DIDN'T LAST AND YOU DIDN'T HAVE MONEY TO GET MORE.: NEVER TRUE

## 2024-11-25 SDOH — HEALTH STABILITY: PHYSICAL HEALTH: ON AVERAGE, HOW MANY DAYS PER WEEK DO YOU ENGAGE IN MODERATE TO STRENUOUS EXERCISE (LIKE A BRISK WALK)?: 0 DAYS

## 2024-11-25 SDOH — ECONOMIC STABILITY: FOOD INSECURITY: WITHIN THE PAST 12 MONTHS, YOU WORRIED THAT YOUR FOOD WOULD RUN OUT BEFORE YOU GOT MONEY TO BUY MORE.: NEVER TRUE

## 2024-11-25 SDOH — HEALTH STABILITY: PHYSICAL HEALTH: ON AVERAGE, HOW MANY MINUTES DO YOU ENGAGE IN EXERCISE AT THIS LEVEL?: 0 MIN

## 2024-11-25 ASSESSMENT — LIFESTYLE VARIABLES
HOW OFTEN DO YOU HAVE SIX OR MORE DRINKS ON ONE OCCASION: 1
HOW OFTEN DO YOU HAVE A DRINK CONTAINING ALCOHOL: 3
HOW MANY STANDARD DRINKS CONTAINING ALCOHOL DO YOU HAVE ON A TYPICAL DAY: 1
HOW MANY STANDARD DRINKS CONTAINING ALCOHOL DO YOU HAVE ON A TYPICAL DAY: 1 OR 2
HOW OFTEN DO YOU HAVE A DRINK CONTAINING ALCOHOL: 2-4 TIMES A MONTH

## 2024-11-25 ASSESSMENT — PATIENT HEALTH QUESTIONNAIRE - PHQ9
SUM OF ALL RESPONSES TO PHQ9 QUESTIONS 1 & 2: 0
2. FEELING DOWN, DEPRESSED OR HOPELESS: NOT AT ALL
SUM OF ALL RESPONSES TO PHQ QUESTIONS 1-9: 0
1. LITTLE INTEREST OR PLEASURE IN DOING THINGS: NOT AT ALL

## 2024-11-26 ENCOUNTER — OFFICE VISIT (OUTPATIENT)
Facility: CLINIC | Age: 69
End: 2024-11-26

## 2024-11-26 ENCOUNTER — OFFICE VISIT (OUTPATIENT)
Facility: CLINIC | Age: 69
End: 2024-11-26
Payer: MEDICARE

## 2024-11-26 VITALS
RESPIRATION RATE: 16 BRPM | SYSTOLIC BLOOD PRESSURE: 118 MMHG | WEIGHT: 211.5 LBS | HEART RATE: 59 BPM | DIASTOLIC BLOOD PRESSURE: 84 MMHG | BODY MASS INDEX: 25.75 KG/M2 | TEMPERATURE: 97.8 F | OXYGEN SATURATION: 98 % | HEIGHT: 76 IN

## 2024-11-26 VITALS
TEMPERATURE: 97.8 F | SYSTOLIC BLOOD PRESSURE: 118 MMHG | BODY MASS INDEX: 25.75 KG/M2 | HEIGHT: 76 IN | WEIGHT: 211.5 LBS | OXYGEN SATURATION: 98 % | RESPIRATION RATE: 16 BRPM | DIASTOLIC BLOOD PRESSURE: 84 MMHG | HEART RATE: 59 BPM

## 2024-11-26 DIAGNOSIS — N52.9 ERECTILE DYSFUNCTION, UNSPECIFIED ERECTILE DYSFUNCTION TYPE: ICD-10-CM

## 2024-11-26 DIAGNOSIS — R73.03 PREDIABETES: Primary | ICD-10-CM

## 2024-11-26 DIAGNOSIS — Z71.89 ACP (ADVANCE CARE PLANNING): ICD-10-CM

## 2024-11-26 DIAGNOSIS — E53.8 DEFICIENCY OF OTHER SPECIFIED B GROUP VITAMINS: ICD-10-CM

## 2024-11-26 DIAGNOSIS — I35.1 NONRHEUMATIC AORTIC (VALVE) INSUFFICIENCY: ICD-10-CM

## 2024-11-26 DIAGNOSIS — I45.10 UNSPECIFIED RIGHT BUNDLE-BRANCH BLOCK: ICD-10-CM

## 2024-11-26 DIAGNOSIS — G25.0 ESSENTIAL TREMOR: ICD-10-CM

## 2024-11-26 DIAGNOSIS — E78.00 PURE HYPERCHOLESTEROLEMIA, UNSPECIFIED: ICD-10-CM

## 2024-11-26 DIAGNOSIS — R73.01 IMPAIRED FASTING GLUCOSE: ICD-10-CM

## 2024-11-26 DIAGNOSIS — C44.90 SKIN CANCER: ICD-10-CM

## 2024-11-26 DIAGNOSIS — I49.1 ATRIAL PREMATURE DEPOLARIZATION: ICD-10-CM

## 2024-11-26 DIAGNOSIS — Z00.00 MEDICARE ANNUAL WELLNESS VISIT, SUBSEQUENT: Primary | ICD-10-CM

## 2024-11-26 PROCEDURE — 1123F ACP DISCUSS/DSCN MKR DOCD: CPT | Performed by: FAMILY MEDICINE

## 2024-11-26 PROCEDURE — G8484 FLU IMMUNIZE NO ADMIN: HCPCS | Performed by: FAMILY MEDICINE

## 2024-11-26 PROCEDURE — 1159F MED LIST DOCD IN RCRD: CPT | Performed by: FAMILY MEDICINE

## 2024-11-26 PROCEDURE — G8427 DOCREV CUR MEDS BY ELIG CLIN: HCPCS | Performed by: FAMILY MEDICINE

## 2024-11-26 PROCEDURE — 1160F RVW MEDS BY RX/DR IN RCRD: CPT | Performed by: FAMILY MEDICINE

## 2024-11-26 PROCEDURE — 1036F TOBACCO NON-USER: CPT | Performed by: FAMILY MEDICINE

## 2024-11-26 PROCEDURE — G8419 CALC BMI OUT NRM PARAM NOF/U: HCPCS | Performed by: FAMILY MEDICINE

## 2024-11-26 PROCEDURE — 99214 OFFICE O/P EST MOD 30 MIN: CPT | Performed by: FAMILY MEDICINE

## 2024-11-26 PROCEDURE — 3017F COLORECTAL CA SCREEN DOC REV: CPT | Performed by: FAMILY MEDICINE

## 2024-11-26 PROCEDURE — 1126F AMNT PAIN NOTED NONE PRSNT: CPT | Performed by: FAMILY MEDICINE

## 2024-11-26 RX ORDER — VENLAFAXINE HYDROCHLORIDE 150 MG/1
150 CAPSULE, EXTENDED RELEASE ORAL DAILY
Qty: 90 CAPSULE | Refills: 1
Start: 2024-11-26

## 2024-11-26 RX ORDER — TOPIRAMATE 100 MG/1
100 TABLET, FILM COATED ORAL
COMMUNITY
Start: 2024-05-28

## 2024-11-26 NOTE — PROGRESS NOTES
Chief Complaint   Patient presents with    Results     Review of results     Vitamin B-12 Def    Tremors    Pulmonary Nodules    PRE-DM / IFG    Cholesterol Problem      \"Have you been to the ER, urgent care clinic since your last visit?  Hospitalized since your last visit?\"    NO    “Have you seen or consulted any other health care providers outside our system since your last visit?”    NO      No updated immunization noted on [place] Immunization Registry as of 11/25/2024

## 2024-11-26 NOTE — ACP (ADVANCE CARE PLANNING)
Advance Care Planning    General Advance Care Planning (ACP) Conversation      Date of Conversation: 11/26/2024  Conducted with: Patient with Decision Making Capacity    Healthcare Decision Maker:       Primary Decision Maker: AliciaConchai - Spouse - [phone]    Secondary Decision Maker: aliciaraimundo myra - Child - [phone]    Content/Action Overview:   Has ACP documents/care preferences - information provided, considering goals and options  Reviewed DNR/DNI and patient elects Full Code (Attempt Resuscitation)  No changes endorsed.  Is an organ donor.  Review annually.    Length of Voluntary ACP Conversation in minutes:  16 minutes     Leoncio Quarles MD

## 2024-11-26 NOTE — PATIENT INSTRUCTIONS

## 2024-11-26 NOTE — PROGRESS NOTES
Chief Complaint   Patient presents with    Medicare AWV      \"Have you been to the ER, urgent care clinic since your last visit?  Hospitalized since your last visit?\"    NO    “Have you seen or consulted any other health care providers outside our system since your last visit?”    NO             
twice a day Yes Provider, MD Emmett   rosuvastatin (CRESTOR) 5 MG tablet TAKE 1 TABLET NIGHTLY Yes Kallie Rockwell APRN - NP   Cyanocobalamin 1000 MCG SUBL Take 1,000 mcg by mouth daily Yes Automatic Reconciliation, Ar   venlafaxine (EFFEXOR XR) 150 MG extended release capsule Take 1 capsule by mouth daily  Leoncio Quarles MD       [facility] (Including outside providers/suppliers regularly involved in providing care):   Patient Care Team:  Leoncio Quarles MD as PCP - General  Leoncio Quarles MD as PCP - Empaneled Provider  Lux Barlow MD (Dermatology)  Lara Dutton as Nurse Practitioner (Psychiatry)  Goldberg, Eric J, MD (Neurology)      Reviewed and updated this visit:  Tobacco  Allergies  Meds  Problems  Med Hx  Surg Hx  Soc Hx  Fam Hx

## 2024-11-26 NOTE — PROGRESS NOTES
SUBJECTIVE  Chief Complaint   Patient presents with    Results     Review of results     Vitamin B-12 Def    Tremors    PRE-DM / IFG    Cholesterol Problem      No new complaints.    He is seeing cardiology for management of cardiac risk factors.  He is on a low dose Crestor 5mg daily.     Saw derm and had 3 biopsies, one of which was cancerous on his chest.  He had a wide excision with clear margins.  He has not had follow-up with derm, canceling thinking it was only to make sure his wound was okay.    Taking topamax for tremors and doing well.      He is taking vit B12 for his history of deficiency.     ROS:  History obtained from the patient  Respiratory: no cough, shortness of breath, or wheezing  Cardiovascular: no chest pain, palpitations, or dyspnea on exertion  Gastrointestinal: no abdominal pain, N/V, has occasional pain over umbilicus   : ED - labs showed mixed picture with testosterone levels    OBJECTIVE  Blood pressure 118/84, pulse 59, temperature 97.8 °F (36.6 °C), temperature source Temporal, resp. rate 16, height 1.93 m (6' 4\"), weight 95.9 kg (211 lb 8 oz), SpO2 98%.   General:  alert, cooperative, well appearing, in no apparent distress.  CV:  The heart sounds are regular in rate and rhythm.  There is a normal S1 and S2.  There or no murmurs.    Lungs:  Inspiratory and expiratory efforts are full and unlabored.  Lung sounds are clear and equal to auscultation throughout all lung fields without wheezing, rales, or rhonchi.  Ext: no swelling of shins.     Latest Reference Range & Units 11/15/24 09:19   Sodium 136 - 145 mmol/L 141   Potassium 3.5 - 5.5 mmol/L 4.1   Chloride 100 - 111 mmol/L 110   CARBON DIOXIDE 21 - 32 mmol/L 25   BUN,BUNPL 7.0 - 18 MG/DL 17   Creatinine 0.6 - 1.3 MG/DL 1.06   Bun/Cre 12 - 20   16   Anion Gap 3.0 - 18 mmol/L 6   Est, Glom Filt Rate >60 ml/min/1.73m2 76   Glucose 74 - 99 mg/dL 88   Calcium 8.5 - 10.1 MG/DL 9.2   Albumin/Globulin Ratio 0.8 - 1.7   1.4   Total Protein

## 2024-11-26 NOTE — PATIENT INSTRUCTIONS

## 2024-12-09 ENCOUNTER — OFFICE VISIT (OUTPATIENT)
Age: 69
End: 2024-12-09
Payer: MEDICARE

## 2024-12-09 VITALS
WEIGHT: 214 LBS | DIASTOLIC BLOOD PRESSURE: 82 MMHG | BODY MASS INDEX: 26.06 KG/M2 | HEIGHT: 76 IN | SYSTOLIC BLOOD PRESSURE: 130 MMHG | HEART RATE: 66 BPM | OXYGEN SATURATION: 96 %

## 2024-12-09 DIAGNOSIS — E78.00 HYPERCHOLESTEROLEMIA: ICD-10-CM

## 2024-12-09 DIAGNOSIS — I45.10 RBBB: Primary | ICD-10-CM

## 2024-12-09 PROCEDURE — 3017F COLORECTAL CA SCREEN DOC REV: CPT | Performed by: INTERNAL MEDICINE

## 2024-12-09 PROCEDURE — 99213 OFFICE O/P EST LOW 20 MIN: CPT | Performed by: INTERNAL MEDICINE

## 2024-12-09 PROCEDURE — 93000 ELECTROCARDIOGRAM COMPLETE: CPT | Performed by: INTERNAL MEDICINE

## 2024-12-09 PROCEDURE — 1123F ACP DISCUSS/DSCN MKR DOCD: CPT | Performed by: INTERNAL MEDICINE

## 2024-12-09 PROCEDURE — 1160F RVW MEDS BY RX/DR IN RCRD: CPT | Performed by: INTERNAL MEDICINE

## 2024-12-09 PROCEDURE — 1159F MED LIST DOCD IN RCRD: CPT | Performed by: INTERNAL MEDICINE

## 2024-12-09 PROCEDURE — G8419 CALC BMI OUT NRM PARAM NOF/U: HCPCS | Performed by: INTERNAL MEDICINE

## 2024-12-09 PROCEDURE — G8427 DOCREV CUR MEDS BY ELIG CLIN: HCPCS | Performed by: INTERNAL MEDICINE

## 2024-12-09 PROCEDURE — 1126F AMNT PAIN NOTED NONE PRSNT: CPT | Performed by: INTERNAL MEDICINE

## 2024-12-09 PROCEDURE — G8484 FLU IMMUNIZE NO ADMIN: HCPCS | Performed by: INTERNAL MEDICINE

## 2024-12-09 PROCEDURE — 1036F TOBACCO NON-USER: CPT | Performed by: INTERNAL MEDICINE

## 2024-12-09 ASSESSMENT — ENCOUNTER SYMPTOMS
ABDOMINAL DISTENTION: 0
SHORTNESS OF BREATH: 0
NAUSEA: 0
SORE THROAT: 0
VOMITING: 0
COUGH: 0
ABDOMINAL PAIN: 0

## 2024-12-09 ASSESSMENT — PATIENT HEALTH QUESTIONNAIRE - PHQ9
1. LITTLE INTEREST OR PLEASURE IN DOING THINGS: NOT AT ALL
SUM OF ALL RESPONSES TO PHQ QUESTIONS 1-9: 0
2. FEELING DOWN, DEPRESSED OR HOPELESS: NOT AT ALL
SUM OF ALL RESPONSES TO PHQ9 QUESTIONS 1 & 2: 0

## 2024-12-09 ASSESSMENT — ANXIETY QUESTIONNAIRES
5. BEING SO RESTLESS THAT IT IS HARD TO SIT STILL: NOT AT ALL
2. NOT BEING ABLE TO STOP OR CONTROL WORRYING: NOT AT ALL
1. FEELING NERVOUS, ANXIOUS, OR ON EDGE: NOT AT ALL
3. WORRYING TOO MUCH ABOUT DIFFERENT THINGS: NOT AT ALL
4. TROUBLE RELAXING: NOT AT ALL
6. BECOMING EASILY ANNOYED OR IRRITABLE: NOT AT ALL
7. FEELING AFRAID AS IF SOMETHING AWFUL MIGHT HAPPEN: NOT AT ALL
GAD7 TOTAL SCORE: 0

## 2024-12-09 NOTE — PROGRESS NOTES
Lj Lux Vigilley presents today for   Chief Complaint   Patient presents with    Follow-up     1 year       Lj Vargas preferred language for health care discussion is english/other.    Is someone accompanying this pt? no    Is the patient using any DME equipment during OV? no    Depression Screening:  Depression: Not at risk (12/9/2024)    PHQ-2     PHQ-2 Score: 0        Learning Assessment:  Who is the primary learner? Patient    What is the preferred language for health care of the primary learner? ENGLISH    How does the primary learner prefer to learn new concepts? DEMONSTRATION    Answered By patient    Relationship to Learner SELF           Pt currently taking Anticoagulant therapy? no    Pt currently taking Antiplatelet therapy ? no      Coordination of Care:  1. Have you been to the ER, urgent care clinic since your last visit? Hospitalized since your last visit? no    2. Have you seen or consulted any other health care providers outside of the [facility] since your last visit? Include any pap smears or colon screening. no    
diverticulosis of the sigmoid colon / repeat in 10 years per report    S/P colonoscopy 1/29/07    Dr. Jacobson, no polyps     Current Outpatient Medications   Medication Sig Dispense Refill    topiramate (TOPAMAX) 100 MG tablet 1 tablet 2 tablets by mouth twice a day      venlafaxine (EFFEXOR XR) 150 MG extended release capsule Take 1 capsule by mouth daily 90 capsule 1    rosuvastatin (CRESTOR) 5 MG tablet TAKE 1 TABLET NIGHTLY 90 tablet 3    Cyanocobalamin 1000 MCG SUBL Take 1,000 mcg by mouth daily       No current facility-administered medications for this visit.     Allergies   Allergen Reactions    Simvastatin Myalgia     Social History     Tobacco Use    Smoking status: Never    Smokeless tobacco: Never   Vaping Use    Vaping status: Never Used   Substance Use Topics    Alcohol use: Yes     Alcohol/week: 2.0 standard drinks of alcohol     Types: 2 Cans of beer per week    Drug use: No     Family History   Problem Relation Age of Onset    Cancer Mother         pancreatic    Cancer Father 70        lung    Coronary Art Dis Father         50s    Emphysema Father     Heart Failure Father     Other Father         AAA    Alcohol Abuse Father     No Known Problems Sister     Cancer Brother 60        bladder, smoker    Cancer Brother 65        throat cancer    Abdominal aortic aneurysm Brother     Other Brother         smoker    Stroke Brother 74        TIAs    Heart Attack Maternal Grandmother     No Known Problems Maternal Grandfather     No Known Problems Paternal Grandmother     No Known Problems Paternal Grandfather     No Known Problems Other          Review of Systems   Constitutional:  Negative for chills, fatigue and fever.   HENT:  Negative for congestion, hearing loss, nosebleeds and sore throat.    Eyes:  Negative for visual disturbance.   Respiratory:  Negative for cough and shortness of breath.    Cardiovascular:  Negative for chest pain, palpitations and leg swelling.   Gastrointestinal:  Negative for

## 2025-01-07 ENCOUNTER — PATIENT MESSAGE (OUTPATIENT)
Facility: CLINIC | Age: 70
End: 2025-01-07

## 2025-01-17 RX ORDER — VENLAFAXINE HYDROCHLORIDE 150 MG/1
150 CAPSULE, EXTENDED RELEASE ORAL DAILY
Qty: 90 CAPSULE | Refills: 3 | Status: SHIPPED | OUTPATIENT
Start: 2025-01-17

## 2025-05-27 ENCOUNTER — TELEMEDICINE (OUTPATIENT)
Facility: CLINIC | Age: 70
End: 2025-05-27
Payer: MEDICARE

## 2025-05-27 DIAGNOSIS — G25.0 ESSENTIAL TREMOR: ICD-10-CM

## 2025-05-27 DIAGNOSIS — N52.9 ERECTILE DYSFUNCTION, UNSPECIFIED ERECTILE DYSFUNCTION TYPE: ICD-10-CM

## 2025-05-27 DIAGNOSIS — I35.1 NONRHEUMATIC AORTIC (VALVE) INSUFFICIENCY: ICD-10-CM

## 2025-05-27 DIAGNOSIS — E78.00 PURE HYPERCHOLESTEROLEMIA, UNSPECIFIED: ICD-10-CM

## 2025-05-27 DIAGNOSIS — Z12.5 ENCOUNTER FOR SCREENING FOR MALIGNANT NEOPLASM OF PROSTATE: ICD-10-CM

## 2025-05-27 DIAGNOSIS — I49.1 ATRIAL PREMATURE DEPOLARIZATION: ICD-10-CM

## 2025-05-27 DIAGNOSIS — E53.8 DEFICIENCY OF OTHER SPECIFIED B GROUP VITAMINS: ICD-10-CM

## 2025-05-27 DIAGNOSIS — R73.03 PREDIABETES: Primary | ICD-10-CM

## 2025-05-27 DIAGNOSIS — C44.90 SKIN CANCER: ICD-10-CM

## 2025-05-27 DIAGNOSIS — R73.01 IMPAIRED FASTING GLUCOSE: ICD-10-CM

## 2025-05-27 PROCEDURE — 99214 OFFICE O/P EST MOD 30 MIN: CPT | Performed by: FAMILY MEDICINE

## 2025-05-27 PROCEDURE — 3017F COLORECTAL CA SCREEN DOC REV: CPT | Performed by: FAMILY MEDICINE

## 2025-05-27 PROCEDURE — G8427 DOCREV CUR MEDS BY ELIG CLIN: HCPCS | Performed by: FAMILY MEDICINE

## 2025-05-27 PROCEDURE — 1123F ACP DISCUSS/DSCN MKR DOCD: CPT | Performed by: FAMILY MEDICINE

## 2025-05-27 PROCEDURE — 1160F RVW MEDS BY RX/DR IN RCRD: CPT | Performed by: FAMILY MEDICINE

## 2025-05-27 PROCEDURE — 1159F MED LIST DOCD IN RCRD: CPT | Performed by: FAMILY MEDICINE

## 2025-05-27 RX ORDER — PRIMIDONE 50 MG/1
25-50 TABLET ORAL NIGHTLY
Qty: 90 TABLET | Refills: 0 | Status: SHIPPED | OUTPATIENT
Start: 2025-05-27

## 2025-05-27 SDOH — ECONOMIC STABILITY: FOOD INSECURITY: WITHIN THE PAST 12 MONTHS, THE FOOD YOU BOUGHT JUST DIDN'T LAST AND YOU DIDN'T HAVE MONEY TO GET MORE.: NEVER TRUE

## 2025-05-27 SDOH — ECONOMIC STABILITY: FOOD INSECURITY: WITHIN THE PAST 12 MONTHS, YOU WORRIED THAT YOUR FOOD WOULD RUN OUT BEFORE YOU GOT MONEY TO BUY MORE.: NEVER TRUE

## 2025-05-27 ASSESSMENT — PATIENT HEALTH QUESTIONNAIRE - PHQ9
2. FEELING DOWN, DEPRESSED OR HOPELESS: NOT AT ALL
SUM OF ALL RESPONSES TO PHQ QUESTIONS 1-9: 0
1. LITTLE INTEREST OR PLEASURE IN DOING THINGS: NOT AT ALL
SUM OF ALL RESPONSES TO PHQ QUESTIONS 1-9: 0

## 2025-05-27 NOTE — PATIENT INSTRUCTIONS
Patient Education        A Healthy Lifestyle: Care Instructions  A healthy lifestyle can help you feel good, have more energy, and stay at a weight that's healthy for you. You can share a healthy lifestyle with your friends and family. And you can do it on your own.    Eat meals with your friends or family. You could try cooking together.   Plan activities with other people. Go for a walk with a friend, try a free online fitness class, or join a sports league.     Eat a variety of healthy foods. These include fruits, vegetables, whole grains, low-fat dairy, and lean protein.   Choose healthy portions of food. You can use the Nutrition Facts label on food packages as a guide.     Eat more fruits and vegetables. You could add vegetables to sandwiches or add fruit to cereal.   Drink water when you are thirsty. Limit soda, juice, and sports drinks.     Try to exercise most days. Aim for at least 2½ hours of exercise each week.   Keep moving. Work in the garden or take your dog on a walk. Use the stairs instead of the elevator.     If you use tobacco or nicotine, try to quit. Ask your doctor about programs and medicines to help you quit.   Limit alcohol. Men should have no more than 2 drinks a day. Women should have no more than 1. For some people, no alcohol is the best choice.   Follow-up care is a key part of your treatment and safety. Be sure to make and go to all appointments, and call your doctor if you are having problems. It's also a good idea to know your test results and keep a list of the medicines you take.  Where can you learn more?  Go to https://www.healthSwipe[employer].net/patientEd and enter U807 to learn more about \"A Healthy Lifestyle: Care Instructions.\"  Current as of: April 30, 2024  Content Version: 14.4  © 3508-5275 [employer] [employer].   Care instructions adapted under license by [employer]. If you have questions about a medical condition or this instruction, always ask your healthcare professional.

## 2025-05-27 NOTE — PROGRESS NOTES
Chief Complaint   Patient presents with    Hyperlipidemia    Erectile Dysfunction    Vitamin B-12 Deficiency       Have you been to the ER, urgent care clinic since your last visit?  Hospitalized since your last visit?   NO    Have you seen or consulted any other health care providers outside our system since your last visit?   NO

## 2025-05-27 NOTE — PROGRESS NOTES
Lj Vargas, was evaluated through a synchronous (real-time) audio-video encounter. The patient (or guardian if applicable) is aware that this is a billable service, which includes applicable co-pays. This Virtual Visit was conducted with patient's (and/or legal guardian's) consent. Patient identification was verified, and a caregiver was present when appropriate.   The patient was located at Facility ([facility]): [address]  [address]  [place] [address]  Provider was located at Home (Appt Dept State): [place]  Confirm you are appropriately licensed, registered, or certified to deliver care in the state where the patient is located as indicated above. If you are not or unsure, please re-schedule the visit: Yes, I confirm.      Total time spent for this encounter: Not billed by time    --Leoncio Quarles MD on 5/27/2025 at 10:50 AM    An electronic signature was used to authenticate this note.    SUBJECTIVE  Chief Complaint   Patient presents with    Hyperlipidemia    Erectile Dysfunction    Vitamin B-12 Deficiency       Repots that he had decreased libido and thus stopped his Topamax.  His tremor is worse as a result.  His libido did improve.  He does want to try alternate medication.  No longer seeing neuro when he decided to stop Topamax.    He is seeing cardiology for management of cardiac risk factors.  He is on a low dose Crestor 5mg daily.     Seeing derm annually for skin ca surveillance.     He is taking vit B12 for his history of deficiency.     ROS:  History obtained from the patient  Respiratory: no cough, shortness of breath, or wheezing  Cardiovascular: no chest pain, palpitations, or dyspnea on exertion  Gastrointestinal: no abdominal pain, N/V, has occasional pain over umbilicus   : ED - labs in the past showed mixed picture with testosterone levels    OBJECTIVE    General:  alert, cooperative, well appearing, in no apparent distress.    ASSESSMENT / PLAN   Diagnosis Orders

## 2025-08-12 RX ORDER — PRIMIDONE 50 MG/1
TABLET ORAL
Qty: 90 TABLET | Refills: 1 | Status: SHIPPED | OUTPATIENT
Start: 2025-08-12

## 2025-09-02 RX ORDER — ROSUVASTATIN CALCIUM 5 MG/1
5 TABLET, COATED ORAL NIGHTLY
Qty: 90 TABLET | Refills: 3 | Status: SHIPPED | OUTPATIENT
Start: 2025-09-02

## 2025-09-05 ENCOUNTER — TELEMEDICINE (OUTPATIENT)
Age: 70
End: 2025-09-05
Payer: MEDICARE

## 2025-09-05 DIAGNOSIS — R19.8 UMBILICUS DISCHARGE: Primary | ICD-10-CM

## 2025-09-05 PROCEDURE — 1123F ACP DISCUSS/DSCN MKR DOCD: CPT | Performed by: NURSE PRACTITIONER

## 2025-09-05 PROCEDURE — 1036F TOBACCO NON-USER: CPT | Performed by: NURSE PRACTITIONER

## 2025-09-05 PROCEDURE — 99213 OFFICE O/P EST LOW 20 MIN: CPT | Performed by: NURSE PRACTITIONER

## 2025-09-05 PROCEDURE — G8427 DOCREV CUR MEDS BY ELIG CLIN: HCPCS | Performed by: NURSE PRACTITIONER

## 2025-09-05 PROCEDURE — G8419 CALC BMI OUT NRM PARAM NOF/U: HCPCS | Performed by: NURSE PRACTITIONER

## 2025-09-05 PROCEDURE — 1160F RVW MEDS BY RX/DR IN RCRD: CPT | Performed by: NURSE PRACTITIONER

## 2025-09-05 PROCEDURE — 3017F COLORECTAL CA SCREEN DOC REV: CPT | Performed by: NURSE PRACTITIONER

## 2025-09-05 PROCEDURE — 1159F MED LIST DOCD IN RCRD: CPT | Performed by: NURSE PRACTITIONER

## 2025-09-05 RX ORDER — MUPIROCIN 2 %
OINTMENT (GRAM) TOPICAL
Qty: 15 G | Refills: 0 | Status: SHIPPED | OUTPATIENT
Start: 2025-09-05 | End: 2025-09-12

## 2025-09-05 ASSESSMENT — ENCOUNTER SYMPTOMS: COLOR CHANGE: 1

## (undated) DEVICE — SYR 10ML LUER LOK 1/5ML GRAD --

## (undated) DEVICE — STERILE POLYISOPRENE POWDER-FREE SURGICAL GLOVES: Brand: PROTEXIS

## (undated) DEVICE — SUTURE V-LOC 180 SZ 0 L9IN ABSRB GRN GS-21 L37MM 1/2 CIR VLOCL0346

## (undated) DEVICE — GLOVE SURG SZ 8 L11.77IN FNGR THK9.8MIL STRW LTX POLYMER

## (undated) DEVICE — TISSUE RETRIEVAL SYSTEM: Brand: INZII RETRIEVAL SYSTEM

## (undated) DEVICE — 3M™ IOBAN™ 2 ANTIMICROBIAL INCISE DRAPE 6651EZ: Brand: IOBAN™ 2

## (undated) DEVICE — PREP SKN CHLRAPRP APL 26ML STR --

## (undated) DEVICE — AIRSEAL BIFURCATED SMOKE EVAC FILTERED TUBE SET: Brand: AIRSEAL

## (undated) DEVICE — KIT CLN UP BON SECOURS MARYV

## (undated) DEVICE — Device

## (undated) DEVICE — COVER MPLR TIP CRV SCIS ACC DA VINCI

## (undated) DEVICE — ARM DRAPE

## (undated) DEVICE — COVER,LIGHT HANDLE,FLX,1/PK: Brand: MEDLINE INDUSTRIES, INC.

## (undated) DEVICE — COLUMN DRAPE

## (undated) DEVICE — VISUALIZATION SYSTEM: Brand: CLEARIFY

## (undated) DEVICE — NEEDLE HYPO 25GA L1.5IN BVL ORIENTED ECLIPSE

## (undated) DEVICE — INTENDED FOR TISSUE SEPARATION, AND OTHER PROCEDURES THAT REQUIRE A SHARP SURGICAL BLADE TO PUNCTURE OR CUT.: Brand: BARD-PARKER ®  SAFETY SCALPED

## (undated) DEVICE — SUTURE MCRYL SZ 4-0 L27IN ABSRB UD L24MM PS-1 3/8 CIR PRIM Y935H

## (undated) DEVICE — SOLUTION IV 1000ML 0.9% SOD CHL

## (undated) DEVICE — MAYO STAND COVER: Brand: CONVERTORS

## (undated) DEVICE — BINDER ABD S/M 12X30-45IN --

## (undated) DEVICE — SEAL UNIV 5-8MM DISP BX/10 -- DA VINCI XI - SNGL USE

## (undated) DEVICE — DRAPE TOWEL: Brand: CONVERTORS

## (undated) DEVICE — REM POLYHESIVE ADULT PATIENT RETURN ELECTRODE: Brand: VALLEYLAB

## (undated) DEVICE — TROCAR: Brand: KII® OPTICAL ACCESS SYSTEM

## (undated) DEVICE — BLADELESS OBTURATOR: Brand: WECK VISTA

## (undated) DEVICE — GLOVE SURG BIOGEL 8.0 STRL -- SKINSENSE

## (undated) DEVICE — SET SUCT IRR TIP DISP STRYKEFLOW2